# Patient Record
Sex: FEMALE | Race: WHITE | NOT HISPANIC OR LATINO | Employment: FULL TIME | URBAN - METROPOLITAN AREA
[De-identification: names, ages, dates, MRNs, and addresses within clinical notes are randomized per-mention and may not be internally consistent; named-entity substitution may affect disease eponyms.]

---

## 2017-03-03 ENCOUNTER — GENERIC CONVERSION - ENCOUNTER (OUTPATIENT)
Dept: OTHER | Facility: OTHER | Age: 32
End: 2017-03-03

## 2017-03-03 DIAGNOSIS — N92.6 IRREGULAR MENSTRUATION: ICD-10-CM

## 2017-03-03 DIAGNOSIS — Z33.1 PREGNANT STATE, INCIDENTAL: ICD-10-CM

## 2017-03-03 DIAGNOSIS — Z86.39 PERSONAL HISTORY OF OTHER ENDOCRINE, NUTRITIONAL AND METABOLIC DISEASE: ICD-10-CM

## 2017-03-04 ENCOUNTER — TRANSCRIBE ORDERS (OUTPATIENT)
Dept: LAB | Facility: CLINIC | Age: 32
End: 2017-03-04

## 2017-03-05 LAB — HCG QUANTITATIVE (HISTORICAL): <2 MIU/ML

## 2017-03-24 ENCOUNTER — ALLSCRIPTS OFFICE VISIT (OUTPATIENT)
Dept: OTHER | Facility: OTHER | Age: 32
End: 2017-03-24

## 2017-03-26 ENCOUNTER — LAB CONVERSION - ENCOUNTER (OUTPATIENT)
Dept: OTHER | Facility: OTHER | Age: 32
End: 2017-03-26

## 2017-03-26 LAB
HBA1C MFR BLD HPLC: 5 % OF TOTAL HGB
TSH SERPL DL<=0.05 MIU/L-ACNC: 1.04 MIU/L

## 2017-03-27 ENCOUNTER — LAB CONVERSION - ENCOUNTER (OUTPATIENT)
Dept: OTHER | Facility: OTHER | Age: 32
End: 2017-03-27

## 2017-03-27 LAB
HBA1C MFR BLD HPLC: 5 % OF TOTAL HGB
TSH SERPL DL<=0.05 MIU/L-ACNC: 1.04 MIU/L

## 2017-03-29 ENCOUNTER — LAB CONVERSION - ENCOUNTER (OUTPATIENT)
Dept: OTHER | Facility: OTHER | Age: 32
End: 2017-03-29

## 2017-03-29 LAB
A/G RATIO (HISTORICAL): 1.7 (CALC) (ref 1–2.5)
ALBUMIN SERPL BCP-MCNC: 4.3 G/DL (ref 3.6–5.1)
ALP SERPL-CCNC: 51 U/L (ref 33–115)
ALT SERPL W P-5'-P-CCNC: 17 U/L (ref 6–29)
AST SERPL W P-5'-P-CCNC: 21 U/L (ref 10–30)
BASOPHILS # BLD AUTO: 0.3 %
BASOPHILS # BLD AUTO: 16 CELLS/UL (ref 0–200)
BILIRUB SERPL-MCNC: 0.6 MG/DL (ref 0.2–1.2)
BUN SERPL-MCNC: 16 MG/DL (ref 7–25)
BUN/CREA RATIO (HISTORICAL): NORMAL (CALC) (ref 6–22)
CALCIUM SERPL-MCNC: 9.5 MG/DL (ref 8.6–10.2)
CHLORIDE SERPL-SCNC: 103 MMOL/L (ref 98–110)
CO2 SERPL-SCNC: 28 MMOL/L (ref 20–31)
CREAT SERPL-MCNC: 0.91 MG/DL (ref 0.5–1.1)
DEPRECATED RDW RBC AUTO: 12.9 % (ref 11–15)
DHEA-SO4 (HISTORICAL): 126 MCG/DL (ref 23–266)
EGFR AFRICAN AMERICAN (HISTORICAL): 97 ML/MIN/1.73M2
EGFR-AMERICAN CALC (HISTORICAL): 84 ML/MIN/1.73M2
EOSINOPHIL # BLD AUTO: 194 CELLS/UL (ref 15–500)
EOSINOPHIL # BLD AUTO: 3.6 %
GAMMA GLOBULIN (HISTORICAL): 2.6 G/DL (CALC) (ref 1.9–3.7)
GLUCOSE (HISTORICAL): 84 MG/DL (ref 65–99)
HBA1C MFR BLD HPLC: 5 % OF TOTAL HGB
HCT VFR BLD AUTO: 41.8 % (ref 35–45)
HGB BLD-MCNC: 13.8 G/DL (ref 11.7–15.5)
INSULIN (HISTORICAL): 2.1 UIU/ML (ref 2–19.6)
LYMPHOCYTES # BLD AUTO: 1566 CELLS/UL (ref 850–3900)
LYMPHOCYTES # BLD AUTO: 29 %
MCH RBC QN AUTO: 28.8 PG (ref 27–33)
MCHC RBC AUTO-ENTMCNC: 33.1 G/DL (ref 32–36)
MCV RBC AUTO: 87.1 FL (ref 80–100)
MONOCYTES # BLD AUTO: 319 CELLS/UL (ref 200–950)
MONOCYTES (HISTORICAL): 5.9 %
NEUTROPHILS # BLD AUTO: 3305 CELLS/UL (ref 1500–7800)
NEUTROPHILS # BLD AUTO: 61.2 %
PLATELET # BLD AUTO: 201 THOUSAND/UL (ref 140–400)
PMV BLD AUTO: 10.2 FL (ref 7.5–12.5)
POTASSIUM SERPL-SCNC: 4.2 MMOL/L (ref 3.5–5.3)
RBC # BLD AUTO: 4.8 MILLION/UL (ref 3.8–5.1)
SODIUM SERPL-SCNC: 137 MMOL/L (ref 135–146)
T4 FREE SERPL-MCNC: 1.1 NG/DL (ref 0.8–1.8)
TESTOSTERONE FREE (HISTORICAL): 5.2 PG/ML (ref 0.1–6.4)
TESTOSTERONE TOTAL (HISTORICAL): 53 NG/DL (ref 2–45)
TOTAL PROTEIN (HISTORICAL): 6.9 G/DL (ref 6.1–8.1)
TSH SERPL DL<=0.05 MIU/L-ACNC: 1.04 MIU/L
WBC # BLD AUTO: 5.4 THOUSAND/UL (ref 3.8–10.8)

## 2017-07-12 ENCOUNTER — GENERIC CONVERSION - ENCOUNTER (OUTPATIENT)
Dept: OTHER | Facility: OTHER | Age: 32
End: 2017-07-12

## 2017-07-12 DIAGNOSIS — Z33.1 PREGNANT STATE, INCIDENTAL: ICD-10-CM

## 2017-07-13 LAB — HCG QUANTITATIVE (HISTORICAL): <2 MIU/ML

## 2017-07-14 ENCOUNTER — HOSPITAL ENCOUNTER (EMERGENCY)
Facility: HOSPITAL | Age: 32
Discharge: HOME | End: 2017-07-14
Attending: EMERGENCY MEDICINE | Admitting: EMERGENCY MEDICINE
Payer: COMMERCIAL

## 2017-07-14 ENCOUNTER — APPOINTMENT (EMERGENCY)
Dept: CT IMAGING | Facility: HOSPITAL | Age: 32
End: 2017-07-14
Payer: COMMERCIAL

## 2017-07-14 VITALS
OXYGEN SATURATION: 100 % | DIASTOLIC BLOOD PRESSURE: 55 MMHG | RESPIRATION RATE: 16 BRPM | HEART RATE: 53 BPM | WEIGHT: 132.94 LBS | SYSTOLIC BLOOD PRESSURE: 108 MMHG | TEMPERATURE: 99.1 F

## 2017-07-14 DIAGNOSIS — K59.00 CONSTIPATION: ICD-10-CM

## 2017-07-14 DIAGNOSIS — K38.9 APPENDICOLITH: ICD-10-CM

## 2017-07-14 DIAGNOSIS — N83.209 RUPTURED OVARIAN CYST: ICD-10-CM

## 2017-07-14 DIAGNOSIS — R10.30 LOWER ABDOMINAL PAIN: Primary | ICD-10-CM

## 2017-07-14 LAB
ALBUMIN SERPL BCP-MCNC: 3.5 G/DL (ref 3.5–5)
ALP SERPL-CCNC: 58 U/L (ref 46–116)
ALT SERPL W P-5'-P-CCNC: 23 U/L (ref 12–78)
ANION GAP SERPL CALCULATED.3IONS-SCNC: 6 MMOL/L (ref 4–13)
AST SERPL W P-5'-P-CCNC: 19 U/L (ref 5–45)
B-HCG SERPL-ACNC: <2 MIU/ML
BASOPHILS # BLD AUTO: 0.02 THOUSANDS/ΜL (ref 0–0.1)
BASOPHILS NFR BLD AUTO: 0 % (ref 0–1)
BILIRUB SERPL-MCNC: 0.4 MG/DL (ref 0.2–1)
BILIRUB UR QL STRIP: NEGATIVE
BUN SERPL-MCNC: 9 MG/DL (ref 5–25)
CALCIUM SERPL-MCNC: 8.9 MG/DL (ref 8.3–10.1)
CHLORIDE SERPL-SCNC: 104 MMOL/L (ref 100–108)
CLARITY UR: CLEAR
CO2 SERPL-SCNC: 27 MMOL/L (ref 21–32)
COLOR UR: YELLOW
CREAT SERPL-MCNC: 0.93 MG/DL (ref 0.6–1.3)
EOSINOPHIL # BLD AUTO: 0.07 THOUSAND/ΜL (ref 0–0.61)
EOSINOPHIL NFR BLD AUTO: 1 % (ref 0–6)
ERYTHROCYTE [DISTWIDTH] IN BLOOD BY AUTOMATED COUNT: 11.9 % (ref 11.6–15.1)
GFR SERPL CREATININE-BSD FRML MDRD: >60 ML/MIN/1.73SQ M
GLUCOSE SERPL-MCNC: 103 MG/DL (ref 65–140)
GLUCOSE UR STRIP-MCNC: NEGATIVE MG/DL
HCT VFR BLD AUTO: 41.2 % (ref 34.8–46.1)
HGB BLD-MCNC: 13.9 G/DL (ref 11.5–15.4)
HGB UR QL STRIP.AUTO: NEGATIVE
HOLD SPECIMEN: NORMAL
KETONES UR STRIP-MCNC: NEGATIVE MG/DL
LEUKOCYTE ESTERASE UR QL STRIP: NEGATIVE
LIPASE SERPL-CCNC: 115 U/L (ref 73–393)
LYMPHOCYTES # BLD AUTO: 0.75 THOUSANDS/ΜL (ref 0.6–4.47)
LYMPHOCYTES NFR BLD AUTO: 11 % (ref 14–44)
MCH RBC QN AUTO: 28.9 PG (ref 26.8–34.3)
MCHC RBC AUTO-ENTMCNC: 33.7 G/DL (ref 31.4–37.4)
MCV RBC AUTO: 86 FL (ref 82–98)
MONOCYTES # BLD AUTO: 0.47 THOUSAND/ΜL (ref 0.17–1.22)
MONOCYTES NFR BLD AUTO: 7 % (ref 4–12)
NEUTROPHILS # BLD AUTO: 5.28 THOUSANDS/ΜL (ref 1.85–7.62)
NEUTS SEG NFR BLD AUTO: 81 % (ref 43–75)
NITRITE UR QL STRIP: NEGATIVE
PH UR STRIP.AUTO: 5 [PH] (ref 4.5–8)
PLATELET # BLD AUTO: 179 THOUSANDS/UL (ref 149–390)
PMV BLD AUTO: 11.6 FL (ref 8.9–12.7)
POTASSIUM SERPL-SCNC: 3.7 MMOL/L (ref 3.5–5.3)
PROT SERPL-MCNC: 7.1 G/DL (ref 6.4–8.2)
PROT UR STRIP-MCNC: NEGATIVE MG/DL
RBC # BLD AUTO: 4.81 MILLION/UL (ref 3.81–5.12)
SODIUM SERPL-SCNC: 137 MMOL/L (ref 136–145)
SP GR UR STRIP.AUTO: <=1.005 (ref 1–1.03)
UROBILINOGEN UR QL STRIP.AUTO: 0.2 E.U./DL
WBC # BLD AUTO: 6.59 THOUSAND/UL (ref 4.31–10.16)

## 2017-07-14 PROCEDURE — 81003 URINALYSIS AUTO W/O SCOPE: CPT | Performed by: EMERGENCY MEDICINE

## 2017-07-14 PROCEDURE — 85025 COMPLETE CBC W/AUTO DIFF WBC: CPT | Performed by: EMERGENCY MEDICINE

## 2017-07-14 PROCEDURE — 84702 CHORIONIC GONADOTROPIN TEST: CPT | Performed by: EMERGENCY MEDICINE

## 2017-07-14 PROCEDURE — 80053 COMPREHEN METABOLIC PANEL: CPT | Performed by: EMERGENCY MEDICINE

## 2017-07-14 PROCEDURE — 74177 CT ABD & PELVIS W/CONTRAST: CPT

## 2017-07-14 PROCEDURE — 36415 COLL VENOUS BLD VENIPUNCTURE: CPT | Performed by: EMERGENCY MEDICINE

## 2017-07-14 PROCEDURE — 83690 ASSAY OF LIPASE: CPT | Performed by: EMERGENCY MEDICINE

## 2017-07-14 PROCEDURE — 99284 EMERGENCY DEPT VISIT MOD MDM: CPT

## 2017-07-14 PROCEDURE — 96360 HYDRATION IV INFUSION INIT: CPT

## 2017-07-14 RX ADMIN — SODIUM CHLORIDE 1000 ML: 0.9 INJECTION, SOLUTION INTRAVENOUS at 05:06

## 2017-07-14 RX ADMIN — IOHEXOL 100 ML: 350 INJECTION, SOLUTION INTRAVENOUS at 06:38

## 2017-07-17 ENCOUNTER — GENERIC CONVERSION - ENCOUNTER (OUTPATIENT)
Dept: OTHER | Facility: OTHER | Age: 32
End: 2017-07-17

## 2017-07-24 LAB
ESTRADIOL LEVEL (HISTORICAL): 45 PG/ML
ESTRADIOL, FREE (HISTORICAL): 0.73 PG/ML
HCG QUANTITATIVE (HISTORICAL): <2 MIU/ML
PROGESTERONE LEVEL (HISTORICAL): <0.1 NG/ML

## 2017-07-25 ENCOUNTER — GENERIC CONVERSION - ENCOUNTER (OUTPATIENT)
Dept: OTHER | Facility: OTHER | Age: 32
End: 2017-07-25

## 2017-08-13 LAB
HCG QUANTITATIVE (HISTORICAL): 173 MIU/ML
PROGESTERONE LEVEL (HISTORICAL): 16.1 NG/ML

## 2017-08-14 DIAGNOSIS — Z34.90 ENCOUNTER FOR SUPERVISION OF NORMAL PREGNANCY: ICD-10-CM

## 2017-08-14 DIAGNOSIS — O26.859 SPOTTING COMPLICATING PREGNANCY: ICD-10-CM

## 2017-08-15 ENCOUNTER — GENERIC CONVERSION - ENCOUNTER (OUTPATIENT)
Dept: OTHER | Facility: OTHER | Age: 32
End: 2017-08-15

## 2017-08-15 LAB — HCG QUANTITATIVE (HISTORICAL): 925 MIU/ML

## 2017-08-22 LAB — HCG QUANTITATIVE (HISTORICAL): NORMAL MIU/ML

## 2017-08-25 ENCOUNTER — HOSPITAL ENCOUNTER (OUTPATIENT)
Dept: ULTRASOUND IMAGING | Facility: HOSPITAL | Age: 32
Discharge: HOME | End: 2017-08-25
Payer: COMMERCIAL

## 2017-08-25 DIAGNOSIS — O26.859 SPOTTING COMPLICATING PREGNANCY: ICD-10-CM

## 2017-08-25 PROCEDURE — 76801 OB US < 14 WKS SINGLE FETUS: CPT

## 2017-09-13 ENCOUNTER — ALLSCRIPTS OFFICE VISIT (OUTPATIENT)
Dept: OTHER | Facility: OTHER | Age: 32
End: 2017-09-13

## 2017-09-13 ENCOUNTER — GENERIC CONVERSION - ENCOUNTER (OUTPATIENT)
Dept: OTHER | Facility: OTHER | Age: 32
End: 2017-09-13

## 2017-09-19 LAB
CFTR MUT ANL BLD/T: NORMAL
EXTERNAL ABO GROUPING: NORMAL
EXTERNAL ANTIBODY SCREEN: NORMAL
EXTERNAL HEMATOCRIT: 39.9 %
EXTERNAL HEMOGLOBIN: 13.5 G/DL
EXTERNAL HEPATITIS B SURFACE ANTIGEN: NORMAL
EXTERNAL HIV-1 ANTIBODY: NORMAL
EXTERNAL PLATELET COUNT: 237 K/ΜL
EXTERNAL RH FACTOR: POSITIVE
EXTERNAL RUBELLA IGG QUANTITATION: NORMAL
EXTERNAL SYPHILIS RPR SCREEN: NORMAL

## 2017-09-20 ENCOUNTER — LAB CONVERSION - ENCOUNTER (OUTPATIENT)
Dept: OTHER | Facility: OTHER | Age: 32
End: 2017-09-20

## 2017-09-20 LAB
AB SCRN, RBC W/RFLX ID,TITER,AG (HISTORICAL): NORMAL
ABO GROUP BLD: NORMAL
BACTERIA UR QL AUTO: ABNORMAL /HPF
BASOPHILS # BLD AUTO: 0.3 %
BASOPHILS # BLD AUTO: 27 CELLS/UL (ref 0–200)
BILIRUB UR QL STRIP: NEGATIVE
COLOR UR: YELLOW
COMMENT (HISTORICAL): CLEAR
DEPRECATED RDW RBC AUTO: 12.4 % (ref 11–15)
EOSINOPHIL # BLD AUTO: 1.4 %
EOSINOPHIL # BLD AUTO: 126 CELLS/UL (ref 15–500)
FECAL OCCULT BLOOD DIAGNOSTIC (HISTORICAL): NEGATIVE
GLUCOSE (HISTORICAL): NEGATIVE
HCT VFR BLD AUTO: 39.9 % (ref 35–45)
HEPATITIS B SURFACE ANTIGEN (HISTORICAL): NORMAL
HEPATITIS C ANTIBODY (HISTORICAL): NORMAL
HGB BLD-MCNC: 13.5 G/DL (ref 11.7–15.5)
HIV AG/AB, 4TH GEN (HISTORICAL): NORMAL
HYALINE CASTS #/AREA URNS LPF: ABNORMAL /LPF
KETONES UR STRIP-MCNC: NEGATIVE MG/DL
LEUKOCYTE ESTERASE UR QL STRIP: ABNORMAL
LYMPHOCYTES # BLD AUTO: 2178 CELLS/UL (ref 850–3900)
LYMPHOCYTES # BLD AUTO: 24.2 %
MCH RBC QN AUTO: 29.2 PG (ref 27–33)
MCHC RBC AUTO-ENTMCNC: 33.8 G/DL (ref 32–36)
MCV RBC AUTO: 86.4 FL (ref 80–100)
MONOCYTES # BLD AUTO: 783 CELLS/UL (ref 200–950)
MONOCYTES (HISTORICAL): 8.7 %
NEUTROPHILS # BLD AUTO: 5886 CELLS/UL (ref 1500–7800)
NEUTROPHILS # BLD AUTO: 65.4 %
NITRITE UR QL STRIP: NEGATIVE
PH UR STRIP.AUTO: 5.5 [PH] (ref 5–8)
PLATELET # BLD AUTO: 237 THOUSAND/UL (ref 140–400)
PMV BLD AUTO: 12.5 FL (ref 7.5–12.5)
PROT UR STRIP-MCNC: NEGATIVE MG/DL
RBC # BLD AUTO: 4.62 MILLION/UL (ref 3.8–5.1)
RBC (HISTORICAL): ABNORMAL /HPF
RH BLD: NORMAL
RPR SCREEN (HISTORICAL): NORMAL
RUBELLA, IGG (HISTORICAL): 3.44 INDEX
SIGNAL TO CUT-OFF (HISTORICAL): 0.02
SP GR UR STRIP.AUTO: 1.02 (ref 1–1.03)
SQUAMOUS EPITHELIAL CELLS (HISTORICAL): ABNORMAL /HPF
T4 FREE SERPL-MCNC: 1.9 NG/DL (ref 0.8–1.8)
TSH SERPL DL<=0.05 MIU/L-ACNC: 0.01 MIU/L
WBC # BLD AUTO: 9 THOUSAND/UL (ref 3.8–10.8)
WBC # BLD AUTO: ABNORMAL /HPF

## 2017-09-26 ENCOUNTER — LAB CONVERSION - ENCOUNTER (OUTPATIENT)
Dept: OTHER | Facility: OTHER | Age: 32
End: 2017-09-26

## 2017-09-26 DIAGNOSIS — R94.6 ABNORMAL RESULTS OF THYROID FUNCTION STUDIES: ICD-10-CM

## 2017-09-26 LAB
AB SCRN, RBC W/RFLX ID,TITER,AG (HISTORICAL): NORMAL
ABO GROUP BLD: NORMAL
BACTERIA UR QL AUTO: ABNORMAL /HPF
BASOPHILS # BLD AUTO: 0.3 %
BASOPHILS # BLD AUTO: 27 CELLS/UL (ref 0–200)
BILIRUB UR QL STRIP: NEGATIVE
CF COMMENT (HISTORICAL): NORMAL
COLOR UR: YELLOW
COMMENT (HISTORICAL): CLEAR
DEPRECATED RDW RBC AUTO: 12.4 % (ref 11–15)
EOSINOPHIL # BLD AUTO: 1.4 %
EOSINOPHIL # BLD AUTO: 126 CELLS/UL (ref 15–500)
ETHNICITY (HISTORICAL): NORMAL
FECAL OCCULT BLOOD DIAGNOSTIC (HISTORICAL): NEGATIVE
GLUCOSE (HISTORICAL): NEGATIVE
HCT VFR BLD AUTO: 39.9 % (ref 35–45)
HEPATITIS B SURFACE ANTIGEN (HISTORICAL): NORMAL
HEPATITIS C ANTIBODY (HISTORICAL): NORMAL
HGB BLD-MCNC: 13.5 G/DL (ref 11.7–15.5)
HIV AG/AB, 4TH GEN (HISTORICAL): NORMAL
HYALINE CASTS #/AREA URNS LPF: ABNORMAL /LPF
INTERPRETATION (HISTORICAL): NORMAL
KETONES UR STRIP-MCNC: NEGATIVE MG/DL
LEUKOCYTE ESTERASE UR QL STRIP: ABNORMAL
LYMPHOCYTES # BLD AUTO: 2178 CELLS/UL (ref 850–3900)
LYMPHOCYTES # BLD AUTO: 24.2 %
MCH RBC QN AUTO: 29.2 PG (ref 27–33)
MCHC RBC AUTO-ENTMCNC: 33.8 G/DL (ref 32–36)
MCV RBC AUTO: 86.4 FL (ref 80–100)
METHOD (HISTORICAL): NORMAL
MONOCYTES # BLD AUTO: 783 CELLS/UL (ref 200–950)
MONOCYTES (HISTORICAL): 8.7 %
MUTATIONS/POLYMORPHISMS (HISTORICAL): NORMAL
NEUTROPHILS # BLD AUTO: 5886 CELLS/UL (ref 1500–7800)
NEUTROPHILS # BLD AUTO: 65.4 %
NITRITE UR QL STRIP: NEGATIVE
PH UR STRIP.AUTO: 5.5 [PH] (ref 5–8)
PLATELET # BLD AUTO: 237 THOUSAND/UL (ref 140–400)
PMV BLD AUTO: 12.5 FL (ref 7.5–12.5)
PROT UR STRIP-MCNC: NEGATIVE MG/DL
RBC # BLD AUTO: 4.62 MILLION/UL (ref 3.8–5.1)
RBC (HISTORICAL): ABNORMAL /HPF
REVIEWED BY (HISTORICAL): NORMAL
RH BLD: NORMAL
RPR SCREEN (HISTORICAL): NORMAL
RUBELLA, IGG (HISTORICAL): 3.44 INDEX
SIGNAL TO CUT-OFF (HISTORICAL): 0.02
SP GR UR STRIP.AUTO: 1.02 (ref 1–1.03)
SQUAMOUS EPITHELIAL CELLS (HISTORICAL): ABNORMAL /HPF
T4 FREE SERPL-MCNC: 1.9 NG/DL (ref 0.8–1.8)
TSH SERPL DL<=0.05 MIU/L-ACNC: 0.01 MIU/L
WBC # BLD AUTO: 9 THOUSAND/UL (ref 3.8–10.8)
WBC # BLD AUTO: ABNORMAL /HPF

## 2017-10-01 ENCOUNTER — HOSPITAL ENCOUNTER (OUTPATIENT)
Dept: ULTRASOUND IMAGING | Facility: HOSPITAL | Age: 32
Discharge: HOME | End: 2017-10-01
Payer: COMMERCIAL

## 2017-10-01 DIAGNOSIS — R94.6 ABNORMAL RESULTS OF THYROID FUNCTION STUDIES: ICD-10-CM

## 2017-10-01 PROCEDURE — 76536 US EXAM OF HEAD AND NECK: CPT

## 2017-10-14 ENCOUNTER — GENERIC CONVERSION - ENCOUNTER (OUTPATIENT)
Dept: OTHER | Facility: OTHER | Age: 32
End: 2017-10-14

## 2017-11-09 ENCOUNTER — ALLSCRIPTS OFFICE VISIT (OUTPATIENT)
Dept: OTHER | Facility: OTHER | Age: 32
End: 2017-11-09

## 2017-11-10 NOTE — CONSULTS
Assessment  1  Multiple thyroid nodules (241 1) (E04 2)   2  Low TSH level (794 5) (R94 6)   3  Pregnancy (V22 2) (Z34 90)    Plan  Multiple thyroid nodules    · (1) T4, FREE; Status:Active; Requested for:24Nov2017;    Perform:[facility]; XKB:[id number]; Ordered; For:Multiple thyroid nodules; Ordered By:Sonny Forman;   · (1) TSH; Status:Active; Requested for:24Nov2017;    Perform:[facility]; UHR:[id number]; Ordered; For:Multiple thyroid nodules; Ordered By:Sonny Forman;   · US THYROID; Status:Active; Requested DBX:[id number]; Perform:[facility]; LNP:[id number]; Last Updated Cholo Cast; 11/9/2017 10:53:31 AM;Ordered;thyroid nodules; Ordered By:Sonny Forman;   · Follow-up visit in 6 months Evaluation and Treatment  Follow-up  Status: Complete Done: 34QNF0241   Ordered;Multiple thyroid nodules; Ordered By: Ralph Taylor Performed:  Due: 74GIB6944; Last Updated By: Karin Zamora; 11/9/2017 10:53:18 AM    Discussion/Summary   thyroid nodules -thyroid ultrasound reviewed with the patient-thyroid nodules currently do not meet criteria for fine-needle aspiration biopsy  Will repeat a thyroid ultrasound after she delivers  to monitor for any changes in the size or characteristics of the nodules  low TSH level- likely related to pregnancy, TSH has improved, will monitor and repeat thyroid function test in the next month  If normal then once a trimester and recheck after she delivers  3  Pregnancy -progressing well, continue follow-up with Ob Counseling Documentation With Imm: The patient was counseled regarding diagnostic results,-- instructions for management,-- risk factor reductions,-- impressions,-- risks and benefits of treatment options,-- importance of compliance with treatment  Medication SE Review and Pt Understands Tx: The treatment plan was reviewed with the patient/guardian   The patient/guardian understands and agrees with the treatment plan   Patient's Capacity to Self-Care: Patient is able to Self-Care  Chief Complaint  Chief Complaint Free Text Note Form: Consult  History of Present Illness  HPI: 76-year-old woman referred here for evaluation of thyroid nodules and abnormal thyroid function test   She is [id number] weeks pregnant   ,2010 - had an episode of what sounds like thyroiditis - was on methimazole for a few weeks- has had repeat thyroid function tests over the years which have been normal had irregular menstrual for years  pregnancy - conceived naturallyprogressing well, has gained some weight -10 lbsdiarrhea , no palpitationsdisturbancesneck pain  FH of thyroid cancer, no RT to neck      Review of Systems  Endo Adult ROS Female New Patient:  Constitutional/General: no change in ring size,-- no change in shoe size,-- no chills,-- no dizziness,-- no fainting,-- no fatigue,-- no fever,-- no forgetfulness,-- headache,-- loss of sleep,-- no recent weight loss,-- no nervousness,-- no numbness,-- no temperature intolerance,-- no excessive sweating-- and-- weight gain  Muscle/Joint/Bone: no arm pain,-- no back pain,-- no hip pain,-- no leg pain,-- no foot pain,-- no neck pain,-- no hand pain,-- no shoulder pain,-- no arm weakness,-- no back weakness,-- no hip weakness,-- no leg weakness,-- no foot weakness,-- no neck weakness,-- no hand weakness,-- no shoulder weakness,-- no arm numbness,-- no back numbness,-- no hip numbness,-- no leg numbness,-- no foot numbness,-- no neck numbness,-- no hand numbness-- and-- no shoulder numbness  Gastrointestinal: diarrhea-- and-- nausea, but-- no constipation,-- no excessive hunger,-- no excessive thirst,-- no poor appetite,-- no rectal bleeding,-- no stomach pain,-- no vomiting-- and-- no vomiting blood  Cardiovascular: chest pain, but-- no hypertension,-- no irregular heart beat,-- no hypotension,-- no poor circulation,-- no rapid heart beat-- and-- no ankle swelling    Eye/Ear/Nose/Throat: no bleeding gums,-- no blurred vision,-- no difficulty swallowing,-- no double vision,-- no gritty eyes,-- no hoarseness,-- no hearing loss,-- no persistent cough,-- no sinus problems,-- not seeing flashes-- and-- not seeing halos  Skin: no easy bruising,-- no hives,-- no itching,-- no change in a mole,-- no rashes,-- no scar-- and-- no slow healing sores  Genitourinary no blood in urine,-- no frequent urination,-- no night time urination-- and-- no painful urination  Genitourinary - Reproductive abnorrmal period, but-- no bleeding between periods,-- no breast lump,-- no hot flashes,-- no nipple discharge,-- no vaginal discharge,-- not pregnant-- and-- no children  date of last menstruation 7/23/17 the interval of the LMP is unknown periods usually lasts 4 days   ROS Reviewed:   ROS reviewed  Active Problems  1  Abnormal thyroid screen (blood) (794 5) (R94 6)   2  Encounter for prenatal care of first pregnancy, second trimester (V22 0) (Z34 02)   3  History of hyperthyroidism (V12 29) (Z86 39)   4  Pregnancy (V22 2) (Z34 90)    Past Medical History    1  History of Contraceptive use (V25 40) (Z30 40)   2  History of amenorrhea (V13 29) (Z87 42)   3  History of irregular menstrual cycles (V13 29) (Z87 42)   4  History of thyroid disease (V12 29) (Z86 39)   5  History of Menarche (V21 8)   6  History of Spotting in pregnancy, antepartum condition or complication (111 89) (K57 003)   7  History of Visit for routine gyn exam (V72 31) (Z01 419)  Active Problems And Past Medical History Reviewed: The active problems and past medical history were reviewed and updated today  Surgical History  Surgical History Reviewed: The surgical history was reviewed and updated today  Family History  Mother    1  Family history of malignant neoplasm of female breast (V16 3) (Z80 3)   2  Family history of thyroid disease (V18 19) (Z83 49)  Grandmother    3  Family history of thyroid disease (V18 19) (Z83 49)  Grandfather    4   Family history of diabetes mellitus (V18 0) (Z83 3)  Uncle    5  Family history of diabetes mellitus (V18 0) (Z83 3)  Family History Reviewed: The family history was reviewed and updated today  Social History     · Engaged to be    · Monogamous relationship   · Never a smoker   · No drug use   · Occupation   · Primary spoken language English   · Racial background   · Social alcohol use (Z78 9)  Social History Reviewed: The social history was reviewed and updated today  Current Meds   1  Prenatal One Daily TABS; Therapy: (Recorded:24Mar2017) to Recorded  Medication List Reviewed: The medication list was reviewed and updated today  Allergies  1  No Known Drug Allergies    Vitals  Vital Signs    Recorded: 63MRG4647 10:24AM   Heart Rate 64   Systolic 183   Diastolic 74   Height 5 ft 4 in   Weight 135 lb 2 08 oz   BMI Calculated 23 19   BSA Calculated 1 66       Physical Exam   Constitutional  General appearance: No acute distress, well appearing and well nourished  Eyes  Conjunctiva and lids: No swelling, erythema, or discharge  Pupils: Equal, round and reactive to light  The sclera are anicteric  Extraocular movements are intact  Ears, Nose, Mouth, and Throat  External inspection of ears, nose and lips: Normal    Exam of Head: The head is atraumatic and normocephalic  Neck: The neck is supple  The thyroid is normal in size with no palpable nodules  Pulmonary  Auscultation of lungs: Clear to auscultation bilaterally with normal chest expansion  Cardiovascular  Auscultation of heart: Normal rate and rhythm with no murmurs, gallops or rubs  Examination of extremities for edema and/or varicosities: Normal    Abdomen  Abdomen: Abdomen is soft, non-tender with normal bowel sounds  Lymphatic  Palpation of lymph nodes: No supraclavicular or suboccipital lymphadenopathy     Musculoskeletal  Gait and station: Normal    Inspection/palpation of joints, bones, and muscles: Muscle bulk and tone is normal    Skin  Skin and subcutaneous tissue: Normal skin temperature and color  Neurologic  Motor Strength: Strength is 5/5 bilaterally  Psychiatric  Orientation to person, place and time: Normal    Mood and affect: Affect and attention span are normal        Results/Data  (1) TSH 24Oct2017 11:25AM Drema Civatte     Test Name Result Flag Reference   TSH 0 28         Summary / No summary entered :    No summary entered  Documents attached :    Constantine Patel Deophyllis Choco; Enc: [id number] - CDA - - Exie Appling) (Additional Information    Document)  (1) T4, FREE 24Oct2017 11:24AM Drema Civatte     Test Name Result Flag Reference   T4,FREE 1 1         Summary / No summary entered :    No summary entered  Documents attached :    Constantine Guthrie; Enc: [id number] - CDA - - [facility]ie Appling) (Additional Information    Document)  [address] THYROID [id number] 02:18PM Rachna Whitttcher Order Number: [id number]   - Patient Instructions: To schedule this appointment, please contact Central Scheduling at [phone]  Test Name Result Flag Reference   US THYROID (Report)       THYROID ULTRASOUND   INDICATION: Hyperthyroidism   COMPARISON: None  TECHNIQUE:  Ultrasound of the thyroid was performed with a high frequency linear transducer in transverse and sagittal planes including volumetric imaging sweeps as well as traditional still imaging technique  FINDINGS:  Thyroid parenchyma is diffusely heterogeneous in echotexture  Right gland: 5 5 x 1 4 x 1 8 cm  There is a 5 x 3 x 5 mm colloid cyst at the midpole right lobe of the thyroid  5 x 5 x 3 mm hypoechoic well-circumscribed nodule at the upper pole right lobe of the thyroid with smooth margins  This is wider than it is tall  This was measured on cine images and measures 8 x 9 x 5 mm hypoechoic nodule which is wider than it is   tall with smooth margins at the lower pole right lobe of the thyroid  This does not meet criteria for fine needle aspiration at this time  Left gland: 5 5 x 1 4 x 1 2 cm  No dominant nodules  Isthmus: The isthmus is 0 3 cm in AP dimension  IMPRESSION:    Heterogeneous appearance of the thyroid gland with subcentimeter right-sided thyroid nodules  These do not meet criteria for fine needle aspiration at this time  Consider yearly follow-up exam to confirm stability of these findings  Workstation performed: [id number]   Signed by:  Zara Hall MD  10/4/17     (1) T4, FREE [id number] 03:30PM [facility] Susie     Test Name Result Flag Reference   T4, FREE 1 9 ng/dL H 0 8-1 8     (Q) TSH, 3RD GENERATION [id number] 03:30PM Pilar Torrington     Test Name Result Flag Reference   TSH 0 01 mIU/L L      Reference Range                       > or = 20 Years  0 40-4 50                            Pregnancy Ranges           First trimester    0 26-2 66           Second trimester   0 55-2 73           Third trimester    0 43-2 91     Future Appointments    Date/Time Provider Specialty Site   2018 10:20 AM NELIDA Fair   Endocrinology [facility]   2017 04:00 PM Nevin Krause MD Obstetrics/Gynecology [facility]   2017 02:30 PM  [address]       Signatures   Electronically signed by : NELIDA Gonzalez ; 2017 12:39PM EST                       (Author)

## 2017-11-17 ENCOUNTER — GENERIC CONVERSION - ENCOUNTER (OUTPATIENT)
Dept: OTHER | Facility: OTHER | Age: 32
End: 2017-11-17

## 2017-11-26 ENCOUNTER — LAB CONVERSION - ENCOUNTER (OUTPATIENT)
Dept: OTHER | Facility: OTHER | Age: 32
End: 2017-11-26

## 2017-11-26 LAB
T4 FREE SERPL-MCNC: 1.2 NG/DL (ref 0.8–1.8)
TSH SERPL DL<=0.05 MIU/L-ACNC: 0.41 MIU/L

## 2017-11-27 ENCOUNTER — GENERIC CONVERSION - ENCOUNTER (OUTPATIENT)
Dept: OTHER | Facility: OTHER | Age: 32
End: 2017-11-27

## 2017-12-01 ENCOUNTER — GENERIC CONVERSION - ENCOUNTER (OUTPATIENT)
Dept: OTHER | Facility: OTHER | Age: 32
End: 2017-12-01

## 2017-12-01 ENCOUNTER — ALLSCRIPTS OFFICE VISIT (OUTPATIENT)
Dept: PERINATAL CARE | Facility: CLINIC | Age: 32
End: 2017-12-01
Payer: COMMERCIAL

## 2017-12-01 PROCEDURE — 76817 TRANSVAGINAL US OBSTETRIC: CPT | Performed by: OBSTETRICS & GYNECOLOGY

## 2017-12-01 PROCEDURE — 76805 OB US >/= 14 WKS SNGL FETUS: CPT | Performed by: OBSTETRICS & GYNECOLOGY

## 2017-12-15 ENCOUNTER — GENERIC CONVERSION - ENCOUNTER (OUTPATIENT)
Dept: OTHER | Facility: OTHER | Age: 32
End: 2017-12-15

## 2018-01-12 ENCOUNTER — GENERIC CONVERSION - ENCOUNTER (OUTPATIENT)
Dept: OTHER | Facility: OTHER | Age: 33
End: 2018-01-12

## 2018-01-12 VITALS
DIASTOLIC BLOOD PRESSURE: 74 MMHG | WEIGHT: 135.13 LBS | HEIGHT: 64 IN | SYSTOLIC BLOOD PRESSURE: 130 MMHG | HEART RATE: 64 BPM | BODY MASS INDEX: 23.07 KG/M2

## 2018-01-13 NOTE — CONSULTS
I had the pleasure of evaluating your patient, Carina Setting  My full evaluation follows:      Chief Complaint  Consult  History of Present Illness  56-year-old woman referred here for evaluation of thyroid nodules and abnormal thyroid function test     She is [id number] weeks pregnant   ,  in 2010 - had an episode of what sounds like thyroiditis - was on methimazole for a few weeks- has had repeat thyroid function tests over the years which have been normal   has had irregular menstrual for years  ist pregnancy - conceived naturally   pregnancy progressing well, has gained some weight -10 lbs   occasional diarrhea , no palpitations   sleep disturbances   no neck pain  no FH of thyroid cancer, no RT to neck      Review of Systems    Constitutional/General: no change in ring size, no change in shoe size, no chills, no dizziness, no fainting, no fatigue, no fever, no forgetfulness, headache, loss of sleep, no recent weight loss, no nervousness, no numbness, no temperature intolerance, no excessive sweating and weight gain  Muscle/Joint/Bone: no arm pain, no back pain, no hip pain, no leg pain, no foot pain, no neck pain, no hand pain, no shoulder pain, no arm weakness, no back weakness, no hip weakness, no leg weakness, no foot weakness, no neck weakness, no hand weakness, no shoulder weakness, no arm numbness, no back numbness, no hip numbness, no leg numbness, no foot numbness, no neck numbness, no hand numbness and no shoulder numbness  Gastrointestinal: diarrhea and nausea, but no constipation, no excessive hunger, no excessive thirst, no poor appetite, no rectal bleeding, no stomach pain, no vomiting and no vomiting blood  Cardiovascular: chest pain, but no hypertension, no irregular heart beat, no hypotension, no poor circulation, no rapid heart beat and no ankle swelling     Eye/Ear/Nose/Throat: no bleeding gums, no blurred vision, no difficulty swallowing, no double vision, no gritty eyes, no hoarseness, no hearing loss, no persistent cough, no sinus problems, not seeing flashes and not seeing halos  Skin: no easy bruising, no hives, no itching, no change in a mole, no rashes, no scar and no slow healing sores  Genitourinary no blood in urine, no frequent urination, no night time urination and no painful urination  Genitourinary - Reproductive abnorrmal period, but no bleeding between periods, no breast lump, no hot flashes, no nipple discharge, no vaginal discharge, not pregnant and no children  date of last menstruation 7/23/17 the interval of the LMP is unknown periods usually lasts 4 days     ROS reviewed  Active Problems    1  Abnormal thyroid screen (blood) (794 5) (R94 6)   2  Encounter for prenatal care of first pregnancy, second trimester (V22 0) (Z34 02)   3  History of hyperthyroidism (V12 29) (Z86 39)   4  Pregnancy (V22 2) (Z34 90)    Past Medical History    · History of Contraceptive use (V25 40) (Z30 40)   · History of amenorrhea (V13 29) (Z87 42)   · History of irregular menstrual cycles (V13 29) (Z87 42)   · History of thyroid disease (V12 29) (Z86 39)   · History of Menarche (V21 8)   · History of Spotting in pregnancy, antepartum condition or complication (510 53)  (T44 905)   · History of Visit for routine gyn exam (V72 31) (Z01 419)    The active problems and past medical history were reviewed and updated today  Surgical History    The surgical history was reviewed and updated today  Family History    · Family history of malignant neoplasm of female breast (V16 3) (Z80 3)   · Family history of thyroid disease (V18 19) (Z83 49)    · Family history of thyroid disease (V18 19) (Z83 49)    · Family history of diabetes mellitus (V18 0) (Z83 3)    · Family history of diabetes mellitus (V18 0) (Z83 3)    The family history was reviewed and updated today         Social History    · Engaged to be    · Monogamous relationship   · Never a smoker   · No drug use   · Occupation   · TEACHER   · Primary spoken language English   · Racial background   · WHITE   · Social alcohol use (Z78 9)  The social history was reviewed and updated today  Current Meds   1  Prenatal One Daily TABS; Therapy: (Recorded:24Mar2017) to Recorded    The medication list was reviewed and updated today  Allergies    1  No Known Drug Allergies    Vitals   Recorded: 25LQE8874 10:24AM   Heart Rate 64   Systolic 294   Diastolic 74   Height 5 ft 4 in   Weight 135 lb 2 08 oz   BMI Calculated 23 19   BSA Calculated 1 66     Physical Exam    Constitutional   General appearance: No acute distress, well appearing and well nourished  Eyes   Conjunctiva and lids: No swelling, erythema, or discharge  Pupils: Equal, round and reactive to light  The sclera are anicteric  Extraocular movements are intact  Ears, Nose, Mouth, and Throat   External inspection of ears, nose and lips: Normal     Exam of Head: The head is atraumatic and normocephalic  Neck: The neck is supple  The thyroid is normal in size with no palpable nodules  Pulmonary   Auscultation of lungs: Clear to auscultation bilaterally with normal chest expansion  Cardiovascular   Auscultation of heart: Normal rate and rhythm with no murmurs, gallops or rubs  Examination of extremities for edema and/or varicosities: Normal     Abdomen   Abdomen: Abdomen is soft, non-tender with normal bowel sounds  Lymphatic   Palpation of lymph nodes: No supraclavicular or suboccipital lymphadenopathy  Musculoskeletal   Gait and station: Normal     Inspection/palpation of joints, bones, and muscles: Muscle bulk and tone is normal     Skin   Skin and subcutaneous tissue: Normal skin temperature and color  Neurologic   Motor Strength: Strength is 5/5 bilaterally      Psychiatric   Orientation to person, place and time: Normal     Mood and affect: Affect and attention span are normal        Results/Data  (1) TSH 24Oct2017 11:25AM Carl Jen Knight     Test Name Result Flag Reference   TSH 0 28       Summary / No summary entered :      No summary entered  Documents attached :      Constantine Nava; Enc: [id number] - CDA - - [facility]) (Additional Information      Document)  (1) T4, FREE 24Oct2017 11:24AM [place] Esquivel     Test Name Result Flag Reference   T4,FREE 1 1       Summary / No summary entered :      No summary entered  Documents attached :      Francisco Javiermichelle AvitiaMelinda Tena; Enc: [id number] - CDA - - [facility]) (Additional Information      Document)  US THYROID [id number] 02:18PM Gray Villareal Order Number: [id number]    - Patient Instructions: To schedule this appointment, please contact Central Scheduling at [phone]  Test Name Result Flag Reference   US THYROID (Report)     THYROID ULTRASOUND     INDICATION: Hyperthyroidism     COMPARISON: None  TECHNIQUE:  Ultrasound of the thyroid was performed with a high frequency linear transducer in transverse and sagittal planes including volumetric imaging sweeps as well as traditional still imaging technique  FINDINGS:   Thyroid parenchyma is diffusely heterogeneous in echotexture  Right gland: 5 5 x 1 4 x 1 8 cm  There is a 5 x 3 x 5 mm colloid cyst at the midpole right lobe of the thyroid  5 x 5 x 3 mm hypoechoic well-circumscribed nodule at the upper pole right lobe of the thyroid with smooth margins  This is wider than it is tall  This was measured on cine images and measures 8 x 9 x 5 mm hypoechoic nodule which is wider than it is    tall with smooth margins at the lower pole right lobe of the thyroid  This does not meet criteria for fine needle aspiration at this time  Left gland: 5 5 x 1 4 x 1 2 cm  No dominant nodules  Isthmus: The isthmus is 0 3 cm in AP dimension  IMPRESSION:      Heterogeneous appearance of the thyroid gland with subcentimeter right-sided thyroid nodules   These do not meet criteria for fine needle aspiration at this time  Consider yearly follow-up exam to confirm stability of these findings  Workstation performed: [id number]     Signed by:   Nely Lester MD   10/4/17     (1) T4, FREE [id number] 03:30PM [place] Esquivel     Test Name Result Flag Reference   T4, FREE 1 9 ng/dL H 0 8-1 8     (Q) TSH, 3RD GENERATION [id number] 03:30PM [place] Esquivel     Test Name Result Flag Reference   TSH 0 01 mIU/L L    Reference Range                         > or = 20 Years  0 40-4 50                              Pregnancy Ranges            First trimester    0 26-2 66            Second trimester   0 55-2 73            Third trimester    0 43-2 91     Assessment    1  Multiple thyroid nodules (241 1) (E04 2)   2  Low TSH level (794 5) (R94 6)   3  Pregnancy (V22 2) (Z34 90)    Plan  Multiple thyroid nodules    · (1) T4, FREE; Status:Active; Requested for:24Nov2017;    Perform:[facility]; DEYANIRA:[id number]; Ordered; For:Multiple thyroid nodules; Ordered By:Kulwant Forman;   · (1) TSH; Status:Active; Requested for:24Nov2017;    Perform:[facility]; WWM:[id number]; Ordered; For:Multiple thyroid nodules; Ordered By:Kulwant Forman;   · US THYROID; Status:Active; Requested FDF:[id number]; Perform:[facility]; XDT:[id number]; Last Updated Eriberto Man; 11/9/2017 10:53:31 AM;Ordered; For:Multiple thyroid nodules; Ordered By:Kulwant Forman;   · Follow-up visit in 6 months Evaluation and Treatment  Follow-up  Status: Complete   Done: 77FNS9242   Ordered; For: Multiple thyroid nodules; Ordered By: Regulo Nava Performed:  Due: 01YMK7427; Last Updated By: Danielle Cline; 11/9/2017 10:53:18 AM    Discussion/Summary   thyroid nodules -thyroid ultrasound reviewed with the patient-thyroid nodules currently do not meet criteria for fine-needle aspiration biopsy    Will repeat a thyroid ultrasound after she delivers  to monitor for any changes in the size or characteristics of the nodules   low TSH level- likely related to pregnancy, TSH has improved, will monitor and repeat thyroid function test in the next month  If normal then once a trimester and recheck after she delivers   3  Pregnancy -progressing well, continue follow-up with Ob   The patient was counseled regarding diagnostic results, instructions for management, risk factor reductions, impressions, risks and benefits of treatment options, importance of compliance with treatment  The treatment plan was reviewed with the patient/guardian  The patient/guardian understands and agrees with the treatment plan   Patient is able to Self-Care  Thank you very much for allowing me to participate in the care of this patient  If you have any questions, please do not hesitate to contact me        Future Appointments    Signatures   Electronically signed by : NELIDA Gaming ; Nov 9 2017 12:39PM EST                       (Author)

## 2018-01-14 VITALS
HEIGHT: 64 IN | DIASTOLIC BLOOD PRESSURE: 64 MMHG | SYSTOLIC BLOOD PRESSURE: 110 MMHG | WEIGHT: 127 LBS | BODY MASS INDEX: 21.68 KG/M2

## 2018-01-15 ENCOUNTER — GENERIC CONVERSION - ENCOUNTER (OUTPATIENT)
Dept: OTHER | Facility: OTHER | Age: 33
End: 2018-01-15

## 2018-01-17 NOTE — RESULT NOTES
Discussion/Summary   tsh normalized , continue to monitor - repeat tsh and free t4 in 6-8 weeks      Verified Results  (1) T4, FREE [id number] 11:37AM Element ID     Test Name Result Flag Reference   T4, FREE 1 2 ng/dL  0 8-1 8     (Q) TSH, 3RD GENERATION [id number] 11:37AM Element ID     Test Name Result Flag Reference   TSH 0 41 mIU/L     Reference Range                         > or = 20 Years  0 40-4 50                              Pregnancy Ranges            First trimester    0 26-2 66            Second trimester   0 55-2 73            Third trimester    0 43-2 91

## 2018-01-22 VITALS
WEIGHT: 138 LBS | BODY MASS INDEX: 23.56 KG/M2 | SYSTOLIC BLOOD PRESSURE: 122 MMHG | HEIGHT: 64 IN | DIASTOLIC BLOOD PRESSURE: 68 MMHG

## 2018-01-22 VITALS — BODY MASS INDEX: 21.97 KG/M2 | SYSTOLIC BLOOD PRESSURE: 130 MMHG | DIASTOLIC BLOOD PRESSURE: 70 MMHG | WEIGHT: 128 LBS

## 2018-01-22 VITALS
SYSTOLIC BLOOD PRESSURE: 110 MMHG | BODY MASS INDEX: 22.02 KG/M2 | WEIGHT: 129 LBS | DIASTOLIC BLOOD PRESSURE: 64 MMHG | HEIGHT: 64 IN

## 2018-01-22 VITALS
HEART RATE: 61 BPM | DIASTOLIC BLOOD PRESSURE: 71 MMHG | BODY MASS INDEX: 23.56 KG/M2 | SYSTOLIC BLOOD PRESSURE: 119 MMHG | HEIGHT: 64 IN | WEIGHT: 138.01 LBS

## 2018-01-23 NOTE — PROGRESS NOTES
DEC 1 2017         RE: Ashely Avery                                     To: [facility]   MR#: [id number]                                    [address]   :  [facility], [facility]   ENC: [id number]                             Fax: [phone]   (Exam #: [id number])      The LMP of this 33year old,  G1, P0-0-0-0 patient was 2017, her   working JASON is 2018 and the current gestational age is 25 weeks 0   days by  [facility] [address]  A sonographic examination was performed on DEC   1 2017 using real time equipment  The ultrasound examination was performed   using abdominal & vaginal techniques  The patient has a BMI of 23 7  Her   blood pressure today was 119/71  Earliest US on record: 17  8w5d  JASNO 18      Thank you very much for your kind referral of Samia Pedraza to the   [address] [place] [address] in [place] on 2017 for level II ultrasound   evaluation and MFM assessment  Collins Dickinson is a 81-year-old primigravida who   is currently at 20-0/7 weeks gestation by an estimated due date of 2018 which is based upon first trimester ultrasound dating  Her   prenatal course so far has been unremarkable  Collins Dickinson has no complaints    reports fetal movement and denies vaginal bleeding  She recently   received the influenza vaccine  She declined genetic screening earlier in   the pregnancy      Collins Dickinson has a history of unspecified thyroid disease  By history, she   has been evaluated for both hyperthyroidism and hypothyroidism in the   past, though she has not required thyroid surgery  She apparently has a   history of thyroid nodules, not requiring needle biopsy  She was treated   for a short time in  with methimazole for the indication of   hyperthyroidism  Collins Dickinson has recently been evaluated by Dr Karthik Reyes of   Endocrinology   A TSH level last week was 0 41 mIU/L, with a free T4 value   of 1 2 ng/dL  Thyroid ultrasound evaluation is planned after her delivery  Isaak Hernández otherwise has unremarkable past medical and surgical histories   with the exception of wisdom teeth removal  She currently takes no   medication with the exception of a prenatal vitamin on a daily basis  She   denies tobacco, alcohol, or illicit drug use during the pregnancy  Her dad   has hypertension  The family medical history is otherwise negative with   respect to first degree relatives with hypertension, diabetes, venous   thromboembolism  The family genetic history is negative with respect to   genetic abnormalities, birth defects, or mental retardation  Cardiac motion was observed at 142 bpm       INDICATIONS      fetal anatomical survey      Exam Types      Transvaginal   LEVEL II      RESULTS      Fetus # 1 of 1   Vertex presentation   Fetal growth appeared normal   Placenta Location = Anterior   No placenta previa   Placenta Grade = I      MEASUREMENTS (* Included In Average GA)      AC              14 1 cm        19 weeks 1 day * (35%)   BPD              4 6 cm        19 weeks 6 days* (49%)   HC              16 8 cm        19 weeks 3 days* (30%)   Femur            3 3 cm        20 weeks 3 days* (48%)      Nuchal Fold      4 6 mm      Humerus          3 0 cm        20 weeks 0 days  (54%)      Cerebellum       2 0 cm        19 weeks 4 days   Biorbit          2 9 cm        18 weeks 6 days   CisternaMagna    3 8 mm      HC/AC           1 20   FL/AC           0 23   FL/BPD          0 71   EFW (Ac/Fl/Hc)   308 grams - 0 lbs 11 oz      THE AVERAGE GESTATIONAL AGE is 19 weeks 5 days +/- 10 days  AMNIOTIC FLUID      Amniotic Fluid: Normal      CERVICAL EVALUATION      The cervix appeared normal (Ultrasound Examination)  SUPINE      Cervical Length: 3 70 cm      OTHER TEST RESULTS           Funneling?: No             Dynamic Changes?: No        Resp   To TFP?: No                      Debris?: No      ANATOMY      Head See Details   Face/Neck                               See Details   Th  Cav  Normal   Heart                                   See Details   Abd  Cav  Normal   Stomach                                 Normal   Right Kidney                            Normal   Left Kidney                             Normal   Bladder                                 Normal   Abd  Wall                               Normal   Spine                                   Normal   Extrems                                 See Details   Genitalia                               Normal   Placenta                                Normal   Umbl  Cord                              Normal   Uterus                                  Normal   PCI                                     Normal      ANATOMY DETAILS      Visualized Appearing Sonographically Normal:   HEAD: (Calvarium, BPD Level, Lateral Ventricles, Choroid Plexus,   Cerebellum, Cisterna Magna);    FACE/NECK: (Neck, Nuchal Fold, Orbits,   Nose/Lips, Palate, Face);    TH  CAV  : (Lungs, Diaphragm); HEART: (Four   Chamber View, Proximal Left Outflow, Proximal Right Outflow, 3VV, 3 Vessel   Trachea, Short Axis of Greater Vessels, Ductal Arch, Aortic Arch,   Interventricular Septum, Interatrial Septum, Cardiac Axis, Cardiac   Position);    ABD  CAV : (Liver);    STOMACH, RIGHT KIDNEY, LEFT KIDNEY,   BLADDER, ABD  WALL, SPINE: (Cervical Spine, Thoracic Spine, Lumbar Spine,   Sacrum);    EXTREMS: (Lt Humerus, Rt Humerus, Lt Forearm, Rt Forearm, Lt   Femur, Rt Femur, Lt Low Leg, Lt Foot);    GENITALIA, PLACENTA, UMBL  CORD,   UTERUS, PCI      Suboptimally Visualized:   EXTREMS: (Lt Hand, Rt Hand, Rt Low Leg, Rt Foot)      Not Visualized:   HEAD: (Cavum);    FACE/NECK: (Profile); HEART: (IVC, SVC)      ADNEXA      The left ovary appeared normal and measured 4 6 x 2 9 x 2 6 cm with a   volume of 18 1 cc   The right ovary appeared normal and measured 3 3 x 2 4   x 1 5 cm with a volume of 6 2 cc  IMPRESSION      Gibson IUP   19 weeks and 5 days by this ultrasound  (JASON=APR 22 2018)   20 weeks and 0 days by 1st Tri Sono  (JASON=APR 20 2018)   Vertex presentation   Fetal growth appeared normal   Regular fetal heart rate of 142 bpm   Anterior placenta   No placenta previa      GENERAL COMMENT      No fetal structural abnormality or ultrasound marker for aneuploidy is   identified on the Level II ultrasound study today  Suboptimal imaging of   the distal right lower extremity, hands, facial profile, nasal bone, cavum   septum pellucidum, IVC, and SVC is afforded by the constraints related to   unfavorable fetal position  Fetal growth and amniotic fluid volume are   normal   The placenta is normal in appearance  The cervix is normal in appearance by transvaginal sonography  The   cervical length is normal   Cervical debris is not present  Cervical   funneling is not present  Neither provocative nor dynamic change is   appreciated  Today's ultrasound findings and suggested follow-up were discussed in   detail with Jung Gomezalex  We discussed that prenatal ultrasound cannot rule   out all congenital abnormalities  Jung Vargas will return to the [facility] [place] operated by [facility] in the early third trimester to reassess fetal interval growth for   the indication of a history of thyroid disease and evaluate anatomic   targets not imaged well today  Serial thyroid function testing is   recommended during this pregnancy, as well as Endocrinology comanagement  The face to face time, in addition to time spent discussing ultrasound   results, was approximately 10 minutes, greater than 50% of which was spent   during counseling and coordination of care  JESUS MANUEL Liu M D     Maternal-Fetal Medicine   Electronically signed 12/02/17 09:24

## 2018-01-23 NOTE — CONSULTS
I had the pleasure of evaluating your patient, Genoveva Taylor  My full evaluation follows:      Chief Complaint  Here for ultrasound study      History of Present Illness  Please refer to the ultrasound report for additional information  Active Problems    1  Abnormal thyroid screen (blood) (794 5) (R94 6)   2  Dysuria (788 1) (R30 0)   3  Encounter for prenatal care of first pregnancy, second trimester (V22 0) (Z34 02)   4  History of hyperthyroidism (V12 29) (Z86 39)   5  Low TSH level (794 5) (R94 6)   6  Multiple thyroid nodules (241 1) (E04 2)   7  Pregnancy (V22 2) (Z34 90)    Past Medical History    · History of Contraceptive use (V25 40) (Z30 40)   · History of amenorrhea (V13 29) (Z87 42)   · History of irregular menstrual cycles (V13 29) (Z87 42)   · History of thyroid disease (V12 29) (Z86 39)   · History of Menarche (V21 8)   · History of Spotting in pregnancy, antepartum condition or complication (306 88)  (X75 541)   · History of Visit for routine gyn exam (V72 31) (Z01 419)    Family History    · Family history of malignant neoplasm of female breast (V16 3) (Z80 3)   · Family history of thyroid disease (V18 19) (Z83 49)    · Family history of thyroid disease (V18 19) (Z83 49)    · Family history of diabetes mellitus (V18 0) (Z83 3)    · Family history of diabetes mellitus (V18 0) (Z83 3)    Social History    · Daily caffeine consumption   · Engaged to be    · Monogamous relationship   · Never a smoker   · No drug use   · Occupation   · TEACHER   · Primary spoken language English   · Racial background   · WHITE   · Social alcohol use (Z78 9)    Current Meds   1  Calcium CHEW;   Therapy: (Recorded:74Muc5005) to Recorded   2  Prenatal One Daily TABS; Therapy: (Recorded:24Mar2017) to Recorded    Allergies    1  No Known Drug Allergies    2  No Known Environmental Allergies   3   No Known Food Allergies    Vitals   Recorded: [id number] 02:16PM   Heart Rate 61   Systolic 697, LUE, Sitting Diastolic 71, LUE, Sitting   Height 5 ft 4 in   Weight 138 lb 0 2 oz   BMI Calculated 23 69   BSA Calculated 1 67   Pain Scale 0     Results/Data  Exam description: level II obstetrical ultrasound, transvaginal obstetrical ultrasound  Findings: Please refer to the ultrasound report for additional information  Discussion/Summary    Please refer to the ultrasound report for additional information  The patient was counseled regarding diagnostic results, instructions for management, prognosis, impressions  Thank you very much for allowing me to participate in the care of this patient  If you have any questions, please do not hesitate to contact me        Future Appointments    Signatures   Electronically signed by : NELIDA Webb ; Dec  2 2017  9:16AM EST                       (Author)

## 2018-01-24 VITALS
HEIGHT: 64 IN | DIASTOLIC BLOOD PRESSURE: 60 MMHG | WEIGHT: 138 LBS | SYSTOLIC BLOOD PRESSURE: 98 MMHG | BODY MASS INDEX: 23.56 KG/M2

## 2018-01-24 VITALS — DIASTOLIC BLOOD PRESSURE: 68 MMHG | BODY MASS INDEX: 24.37 KG/M2 | SYSTOLIC BLOOD PRESSURE: 122 MMHG | WEIGHT: 142 LBS

## 2018-01-24 VITALS
HEIGHT: 64 IN | SYSTOLIC BLOOD PRESSURE: 112 MMHG | WEIGHT: 143 LBS | BODY MASS INDEX: 24.41 KG/M2 | DIASTOLIC BLOOD PRESSURE: 64 MMHG

## 2018-01-28 LAB
BASOPHILS # BLD AUTO: 41 CELLS/UL (ref 0–200)
BASOPHILS NFR BLD AUTO: 0.4 %
EOSINOPHIL # BLD AUTO: 163 CELLS/UL (ref 15–500)
EOSINOPHIL NFR BLD AUTO: 1.6 %
ERYTHROCYTE [DISTWIDTH] IN BLOOD BY AUTOMATED COUNT: 11.9 % (ref 11–15)
GLUCOSE 1H P 50 G GLC PO SERPL-MCNC: 83 MG/DL
HCT VFR BLD AUTO: 36.1 % (ref 35–45)
HGB BLD-MCNC: 12 G/DL (ref 11.7–15.5)
LYMPHOCYTES # BLD AUTO: 1540 CELLS/UL (ref 850–3900)
LYMPHOCYTES NFR BLD AUTO: 15.1 %
MCH RBC QN AUTO: 29.7 PG (ref 27–33)
MCHC RBC AUTO-ENTMCNC: 33.2 G/DL (ref 32–36)
MCV RBC AUTO: 89.4 FL (ref 80–100)
MONOCYTES # BLD AUTO: 775 CELLS/UL (ref 200–950)
MONOCYTES NFR BLD AUTO: 7.6 %
NEUTROPHILS # BLD AUTO: 7681 CELLS/UL (ref 1500–7800)
NEUTROPHILS NFR BLD AUTO: 75.3 %
PLATELET # BLD AUTO: 193 THOUSAND/UL (ref 140–400)
PMV BLD REES-ECKER: 11.7 FL (ref 7.5–12.5)
RBC # BLD AUTO: 4.04 MILLION/UL (ref 3.8–5.1)
T4 FREE SERPL-MCNC: 1 NG/DL (ref 0.8–1.8)
TSH SERPL-ACNC: 0.73 MIU/L
WBC # BLD AUTO: 10.2 THOUSAND/UL (ref 3.8–10.8)

## 2018-02-01 ENCOUNTER — OB ABSTRACT (OUTPATIENT)
Dept: OBGYN CLINIC | Facility: CLINIC | Age: 33
End: 2018-02-01

## 2018-02-02 ENCOUNTER — ROUTINE PRENATAL (OUTPATIENT)
Dept: OBGYN CLINIC | Facility: CLINIC | Age: 33
End: 2018-02-02
Payer: COMMERCIAL

## 2018-02-02 VITALS — DIASTOLIC BLOOD PRESSURE: 60 MMHG | SYSTOLIC BLOOD PRESSURE: 98 MMHG | BODY MASS INDEX: 24.89 KG/M2 | WEIGHT: 145 LBS

## 2018-02-02 DIAGNOSIS — Z3A.29 29 WEEKS GESTATION OF PREGNANCY: ICD-10-CM

## 2018-02-02 DIAGNOSIS — R30.0 DYSURIA: Primary | ICD-10-CM

## 2018-02-02 LAB
SL AMB  POCT GLUCOSE, UA: ABNORMAL
SL AMB LEUKOCYTE ESTERASE,UA: ABNORMAL
SL AMB POCT BILIRUBIN,UA: ABNORMAL
SL AMB POCT BLOOD,UA: ABNORMAL
SL AMB POCT KETONES,UA: ABNORMAL
SL AMB POCT NITRITE,UA: ABNORMAL
SL AMB POCT PH,UA: 6
SL AMB POCT SPECIFIC GRAVITY,UA: 1.02
SL AMB POCT URINE PROTEIN: ABNORMAL

## 2018-02-02 PROCEDURE — PNV: Performed by: OBSTETRICS & GYNECOLOGY

## 2018-02-02 PROCEDURE — 81003 URINALYSIS AUTO W/O SCOPE: CPT | Performed by: OBSTETRICS & GYNECOLOGY

## 2018-02-02 RX ORDER — ACETAMINOPHEN 500 MG
TABLET ORAL
COMMUNITY
End: 2018-09-25

## 2018-02-02 RX ORDER — PNV NO.95/FERROUS FUM/FOLIC AC 28MG-0.8MG
TABLET ORAL
COMMUNITY
End: 2020-08-24

## 2018-02-02 NOTE — PROGRESS NOTES
Visit:  Good FM - did have an upset stomach earlier in the week but improved now -  Tolerating PNV and has f/u at [facility] - some mild cramping with increased activity - reviewed rest and BH  - given packet for checking in visit

## 2018-02-04 LAB — BACTERIA UR CULT: NORMAL

## 2018-02-17 ENCOUNTER — ROUTINE PRENATAL (OUTPATIENT)
Dept: OBGYN CLINIC | Facility: CLINIC | Age: 33
End: 2018-02-17

## 2018-02-17 VITALS — WEIGHT: 150 LBS | SYSTOLIC BLOOD PRESSURE: 110 MMHG | BODY MASS INDEX: 25.75 KG/M2 | DIASTOLIC BLOOD PRESSURE: 66 MMHG

## 2018-02-17 DIAGNOSIS — Z3A.31 31 WEEKS GESTATION OF PREGNANCY: ICD-10-CM

## 2018-02-17 PROCEDURE — PNV: Performed by: OBSTETRICS & GYNECOLOGY

## 2018-02-22 ENCOUNTER — ULTRASOUND (OUTPATIENT)
Dept: PERINATAL CARE | Facility: CLINIC | Age: 33
End: 2018-02-22
Payer: COMMERCIAL

## 2018-02-22 VITALS
BODY MASS INDEX: 26.46 KG/M2 | DIASTOLIC BLOOD PRESSURE: 80 MMHG | SYSTOLIC BLOOD PRESSURE: 121 MMHG | HEIGHT: 64 IN | HEART RATE: 76 BPM | WEIGHT: 155 LBS

## 2018-02-22 DIAGNOSIS — Z3A.31 31 WEEKS GESTATION OF PREGNANCY: ICD-10-CM

## 2018-02-22 DIAGNOSIS — Z36.2 ENCOUNTER FOR OTHER ANTENATAL SCREENING FOLLOW-UP: Primary | ICD-10-CM

## 2018-02-22 PROBLEM — R79.89 ABNORMAL THYROID SCREEN (BLOOD): Status: ACTIVE | Noted: 2017-09-26

## 2018-02-22 PROBLEM — E04.2 MULTIPLE THYROID NODULES: Status: ACTIVE | Noted: 2017-11-09

## 2018-02-22 PROBLEM — R79.89 LOW TSH LEVEL: Status: ACTIVE | Noted: 2017-11-09

## 2018-02-22 PROCEDURE — 99212 OFFICE O/P EST SF 10 MIN: CPT | Performed by: OBSTETRICS & GYNECOLOGY

## 2018-02-22 PROCEDURE — 76816 OB US FOLLOW-UP PER FETUS: CPT | Performed by: OBSTETRICS & GYNECOLOGY

## 2018-02-22 NOTE — PATIENT INSTRUCTIONS

## 2018-02-22 NOTE — PROGRESS NOTES
The patient was seen today for an ultrasound and NST  Please see ultrasound report (located under OB Procedures tab) for additional details

## 2018-02-28 ENCOUNTER — TELEPHONE (OUTPATIENT)
Dept: OBGYN CLINIC | Facility: CLINIC | Age: 33
End: 2018-02-28

## 2018-02-28 NOTE — TELEPHONE ENCOUNTER
Pt called back, confirmed did have chicken pox as a child  Was more concerned over shingles virus  Aware is own reactivation of chicken pox, so she is good

## 2018-03-02 ENCOUNTER — ROUTINE PRENATAL (OUTPATIENT)
Dept: OBGYN CLINIC | Facility: CLINIC | Age: 33
End: 2018-03-02

## 2018-03-02 VITALS — BODY MASS INDEX: 26.26 KG/M2 | WEIGHT: 153 LBS | SYSTOLIC BLOOD PRESSURE: 112 MMHG | DIASTOLIC BLOOD PRESSURE: 76 MMHG

## 2018-03-02 DIAGNOSIS — Z3A.33 33 WEEKS GESTATION OF PREGNANCY: Primary | ICD-10-CM

## 2018-03-02 PROCEDURE — PNV: Performed by: NURSE PRACTITIONER

## 2018-03-02 NOTE — PROGRESS NOTES
OFFICE VISIT: Denies any N/V, HA, Cramping, VB, LOF, Edema, Domestic Violence, Smoking  + FM  Tolerating PNV  RTO 2 weeks for GBS

## 2018-03-07 NOTE — PROGRESS NOTES
Education  Baby & Me Education 1st Trimester:   First Trimester Education provided:   benefits of breastfeeding, importance of exclusive breastfeeding, early initiation of breastfeeding, exclusive breastfeeding for the first 6 months, Pregnancy Essentials Reference Guide given and Other education given:      Prenatal education provided by: Taco Ordaz PA-C      Signatures   Electronically signed by : Taco Ordaz, [facility]; Sep 13 2017  2:13PM EST                       (Author)

## 2018-03-17 ENCOUNTER — ROUTINE PRENATAL (OUTPATIENT)
Dept: OBGYN CLINIC | Facility: CLINIC | Age: 33
End: 2018-03-17

## 2018-03-17 VITALS — SYSTOLIC BLOOD PRESSURE: 98 MMHG | BODY MASS INDEX: 26.95 KG/M2 | DIASTOLIC BLOOD PRESSURE: 66 MMHG | WEIGHT: 157 LBS

## 2018-03-17 DIAGNOSIS — Z34.93 NORMAL PREGNANCY IN THIRD TRIMESTER: Primary | ICD-10-CM

## 2018-03-17 DIAGNOSIS — Z3A.35 35 WEEKS GESTATION OF PREGNANCY: ICD-10-CM

## 2018-03-17 LAB — EXTERNAL GROUP B STREP ANTIGEN: NEGATIVE

## 2018-03-17 PROCEDURE — PNV: Performed by: OBSTETRICS & GYNECOLOGY

## 2018-03-19 LAB — GP B STREP DNA SPEC QL NAA+PROBE: NEGATIVE

## 2018-03-22 ENCOUNTER — ROUTINE PRENATAL (OUTPATIENT)
Dept: OBGYN CLINIC | Facility: CLINIC | Age: 33
End: 2018-03-22

## 2018-03-22 VITALS — DIASTOLIC BLOOD PRESSURE: 74 MMHG | WEIGHT: 157 LBS | SYSTOLIC BLOOD PRESSURE: 124 MMHG | BODY MASS INDEX: 26.95 KG/M2

## 2018-03-22 DIAGNOSIS — Z3A.35 35 WEEKS GESTATION OF PREGNANCY: Primary | ICD-10-CM

## 2018-03-22 PROCEDURE — PNV: Performed by: NURSE PRACTITIONER

## 2018-03-22 NOTE — PROGRESS NOTES
OFFICE VISIT: Mild occasional cramping  Denies any N/V, HA,VB, LOF, Edema, Domestic Violence, Smoking  + FM  Tolerating PNV  GBS Negative  Labor talk done  RTO 1 week

## 2018-03-30 ENCOUNTER — ROUTINE PRENATAL (OUTPATIENT)
Dept: OBGYN CLINIC | Facility: CLINIC | Age: 33
End: 2018-03-30

## 2018-03-30 VITALS
SYSTOLIC BLOOD PRESSURE: 116 MMHG | HEIGHT: 64 IN | WEIGHT: 155 LBS | DIASTOLIC BLOOD PRESSURE: 72 MMHG | BODY MASS INDEX: 26.46 KG/M2

## 2018-03-30 DIAGNOSIS — Z34.03 ENCOUNTER FOR SUPERVISION OF NORMAL FIRST PREGNANCY IN THIRD TRIMESTER: Primary | ICD-10-CM

## 2018-03-30 PROBLEM — Z3A.35 35 WEEKS GESTATION OF PREGNANCY: Status: RESOLVED | Noted: 2018-03-17 | Resolved: 2018-03-30

## 2018-03-30 PROCEDURE — PNV: Performed by: PHYSICIAN ASSISTANT

## 2018-03-30 NOTE — PROGRESS NOTES
VISIT: (+) nausea - mild/intermittant; (+) cramping - dull period cramping with some low back pain that resolves on own; Denies v/HA/vb/lof/edema/dv/smoking  PNVs + DHA - tolerating daily  Good FM - r/isrrael 10 kicks/2 hrs   Tdap done    No change in cervix, however, baby has dropped  Reviewed signs and symptoms of labor and when to call; Reviewed signs and symptoms of pregnancy induced hypertension or preeclampsia and when to call  RTO in 1 weeks for routine ob check or sooner if needed

## 2018-04-05 ENCOUNTER — ROUTINE PRENATAL (OUTPATIENT)
Dept: OBGYN CLINIC | Facility: CLINIC | Age: 33
End: 2018-04-05

## 2018-04-05 VITALS — WEIGHT: 159 LBS | BODY MASS INDEX: 27.29 KG/M2 | SYSTOLIC BLOOD PRESSURE: 102 MMHG | DIASTOLIC BLOOD PRESSURE: 78 MMHG

## 2018-04-05 DIAGNOSIS — Z34.03 ENCOUNTER FOR SUPERVISION OF NORMAL FIRST PREGNANCY IN THIRD TRIMESTER: Primary | ICD-10-CM

## 2018-04-05 PROCEDURE — PNV: Performed by: NURSE PRACTITIONER

## 2018-04-05 NOTE — PROGRESS NOTES
OFFICE VISIT: Pt feeling well waiting on baby  Hospital bags packed  Pt states occasional contractions  Denies any N/V, HA,  VB, LOF, Edema, Domestic Violence, Smoking  + FM  Tolerating PNV  Labor precautions reviewed again, pt aware of when to call  RTO 1 week or sooner as needed

## 2018-04-11 ENCOUNTER — HOSPITAL ENCOUNTER (INPATIENT)
Facility: HOSPITAL | Age: 33
LOS: 1 days | Discharge: HOME | End: 2018-04-13
Attending: OBSTETRICS & GYNECOLOGY | Admitting: OBSTETRICS & GYNECOLOGY
Payer: COMMERCIAL

## 2018-04-11 DIAGNOSIS — Z3A.38 38 WEEKS GESTATION OF PREGNANCY: Primary | ICD-10-CM

## 2018-04-12 ENCOUNTER — ANESTHESIA EVENT (INPATIENT)
Dept: LABOR AND DELIVERY | Facility: HOSPITAL | Age: 33
End: 2018-04-12
Payer: COMMERCIAL

## 2018-04-12 ENCOUNTER — ANESTHESIA (INPATIENT)
Dept: LABOR AND DELIVERY | Facility: HOSPITAL | Age: 33
End: 2018-04-12
Payer: COMMERCIAL

## 2018-04-12 PROBLEM — Z3A.38 38 WEEKS GESTATION OF PREGNANCY: Status: ACTIVE | Noted: 2018-04-12

## 2018-04-12 PROBLEM — O42.90 PREMATURE RUPTURE OF MEMBRANES: Status: ACTIVE | Noted: 2018-04-12

## 2018-04-12 LAB
ABO GROUP BLD: NORMAL
BASE EXCESS BLDCOA CALC-SCNC: -1.1 MMOL/L (ref 3–11)
BASE EXCESS BLDCOV CALC-SCNC: -1.3 MMOL/L (ref 1–9)
BASOPHILS # BLD AUTO: 0.02 THOUSANDS/ΜL (ref 0–0.1)
BASOPHILS NFR BLD AUTO: 0 % (ref 0–1)
BLD GP AB SCN SERPL QL: NEGATIVE
CHLAMYDIA DNA CVX QL NAA+PROBE: NORMAL
EOSINOPHIL # BLD AUTO: 0.18 THOUSAND/ΜL (ref 0–0.61)
EOSINOPHIL NFR BLD AUTO: 1 % (ref 0–6)
ERYTHROCYTE [DISTWIDTH] IN BLOOD BY AUTOMATED COUNT: 13 % (ref 11.6–15.1)
HCO3 BLDCOA-SCNC: 25.9 MMOL/L (ref 17.3–27.3)
HCO3 BLDCOV-SCNC: 23.6 MMOL/L (ref 12.2–28.6)
HCT VFR BLD AUTO: 34.5 % (ref 34.8–46.1)
HGB BLD-MCNC: 11.3 G/DL (ref 11.5–15.4)
LYMPHOCYTES # BLD AUTO: 2.16 THOUSANDS/ΜL (ref 0.6–4.47)
LYMPHOCYTES NFR BLD AUTO: 17 % (ref 14–44)
MCH RBC QN AUTO: 26.3 PG (ref 26.8–34.3)
MCHC RBC AUTO-ENTMCNC: 32.8 G/DL (ref 31.4–37.4)
MCV RBC AUTO: 80 FL (ref 82–98)
MONOCYTES # BLD AUTO: 0.99 THOUSAND/ΜL (ref 0.17–1.22)
MONOCYTES NFR BLD AUTO: 8 % (ref 4–12)
N GONORRHOEA DNA GENITAL QL NAA+PROBE: NORMAL
NEUTROPHILS # BLD AUTO: 9.28 THOUSANDS/ΜL (ref 1.85–7.62)
NEUTS SEG NFR BLD AUTO: 74 % (ref 43–75)
NRBC BLD AUTO-RTO: 0 /100 WBCS
O2 CT VFR BLDCOA CALC: 5 ML/DL
OXYHGB MFR BLDCOA: 24.3 %
OXYHGB MFR BLDCOV: 62.6 %
PCO2 BLDCOA: 52.2 MM[HG] (ref 30–60)
PCO2 BLDCOV: 40.8 MM HG (ref 27–43)
PH BLDCOA: 7.31 [PH] (ref 7.23–7.43)
PH BLDCOV: 7.38 [PH] (ref 7.19–7.49)
PLATELET # BLD AUTO: 200 THOUSANDS/UL (ref 149–390)
PMV BLD AUTO: 12 FL (ref 8.9–12.7)
PO2 BLDCOA: 15.6 MM HG (ref 5–25)
PO2 BLDCOV: 28.2 MM HG (ref 15–45)
RBC # BLD AUTO: 4.29 MILLION/UL (ref 3.81–5.12)
RH BLD: POSITIVE
RPR SER QL: NORMAL
SAO2 % BLDCOV: 13.3 ML/DL
SPECIMEN EXPIRATION DATE: NORMAL
WBC # BLD AUTO: 12.68 THOUSAND/UL (ref 4.31–10.16)

## 2018-04-12 PROCEDURE — 87591 N.GONORRHOEAE DNA AMP PROB: CPT | Performed by: OBSTETRICS & GYNECOLOGY

## 2018-04-12 PROCEDURE — 0HQ9XZZ REPAIR PERINEUM SKIN, EXTERNAL APPROACH: ICD-10-PCS | Performed by: OBSTETRICS & GYNECOLOGY

## 2018-04-12 PROCEDURE — 4A1HXCZ MONITORING OF PRODUCTS OF CONCEPTION, CARDIAC RATE, EXTERNAL APPROACH: ICD-10-PCS | Performed by: OBSTETRICS & GYNECOLOGY

## 2018-04-12 PROCEDURE — G0463 HOSPITAL OUTPT CLINIC VISIT: HCPCS

## 2018-04-12 PROCEDURE — 87491 CHLMYD TRACH DNA AMP PROBE: CPT | Performed by: OBSTETRICS & GYNECOLOGY

## 2018-04-12 PROCEDURE — 86592 SYPHILIS TEST NON-TREP QUAL: CPT | Performed by: OBSTETRICS & GYNECOLOGY

## 2018-04-12 PROCEDURE — 86900 BLOOD TYPING SEROLOGIC ABO: CPT | Performed by: OBSTETRICS & GYNECOLOGY

## 2018-04-12 PROCEDURE — 99214 OFFICE O/P EST MOD 30 MIN: CPT

## 2018-04-12 PROCEDURE — 3E033VJ INTRODUCTION OF OTHER HORMONE INTO PERIPHERAL VEIN, PERCUTANEOUS APPROACH: ICD-10-PCS | Performed by: OBSTETRICS & GYNECOLOGY

## 2018-04-12 PROCEDURE — 59400 OBSTETRICAL CARE: CPT | Performed by: OBSTETRICS & GYNECOLOGY

## 2018-04-12 PROCEDURE — 86901 BLOOD TYPING SEROLOGIC RH(D): CPT | Performed by: OBSTETRICS & GYNECOLOGY

## 2018-04-12 PROCEDURE — 82805 BLOOD GASES W/O2 SATURATION: CPT | Performed by: OBSTETRICS & GYNECOLOGY

## 2018-04-12 PROCEDURE — 85025 COMPLETE CBC W/AUTO DIFF WBC: CPT | Performed by: OBSTETRICS & GYNECOLOGY

## 2018-04-12 PROCEDURE — 86850 RBC ANTIBODY SCREEN: CPT | Performed by: OBSTETRICS & GYNECOLOGY

## 2018-04-12 RX ORDER — DOCUSATE SODIUM 100 MG/1
100 CAPSULE, LIQUID FILLED ORAL 2 TIMES DAILY
Status: DISCONTINUED | OUTPATIENT
Start: 2018-04-12 | End: 2018-04-13 | Stop reason: HOSPADM

## 2018-04-12 RX ORDER — LIDOCAINE HYDROCHLORIDE AND EPINEPHRINE 15; 5 MG/ML; UG/ML
INJECTION, SOLUTION EPIDURAL AS NEEDED
Status: DISCONTINUED | OUTPATIENT
Start: 2018-04-12 | End: 2018-04-12 | Stop reason: SURG

## 2018-04-12 RX ORDER — OXYCODONE HYDROCHLORIDE AND ACETAMINOPHEN 5; 325 MG/1; MG/1
2 TABLET ORAL EVERY 4 HOURS PRN
Status: DISCONTINUED | OUTPATIENT
Start: 2018-04-12 | End: 2018-04-13 | Stop reason: HOSPADM

## 2018-04-12 RX ORDER — ONDANSETRON 2 MG/ML
4 INJECTION INTRAMUSCULAR; INTRAVENOUS EVERY 6 HOURS PRN
Status: DISCONTINUED | OUTPATIENT
Start: 2018-04-12 | End: 2018-04-12

## 2018-04-12 RX ORDER — BUTORPHANOL TARTRATE 1 MG/ML
1 INJECTION, SOLUTION INTRAMUSCULAR; INTRAVENOUS ONCE
Status: COMPLETED | OUTPATIENT
Start: 2018-04-12 | End: 2018-04-12

## 2018-04-12 RX ORDER — OXYTOCIN/RINGER'S LACTATE 30/500 ML
250 PLASTIC BAG, INJECTION (ML) INTRAVENOUS CONTINUOUS
Status: DISCONTINUED | OUTPATIENT
Start: 2018-04-12 | End: 2018-04-13 | Stop reason: HOSPADM

## 2018-04-12 RX ORDER — SODIUM CHLORIDE, SODIUM LACTATE, POTASSIUM CHLORIDE, CALCIUM CHLORIDE 600; 310; 30; 20 MG/100ML; MG/100ML; MG/100ML; MG/100ML
125 INJECTION, SOLUTION INTRAVENOUS CONTINUOUS
Status: DISCONTINUED | OUTPATIENT
Start: 2018-04-12 | End: 2018-04-12

## 2018-04-12 RX ORDER — ONDANSETRON 2 MG/ML
4 INJECTION INTRAMUSCULAR; INTRAVENOUS EVERY 8 HOURS PRN
Status: DISCONTINUED | OUTPATIENT
Start: 2018-04-12 | End: 2018-04-13 | Stop reason: HOSPADM

## 2018-04-12 RX ORDER — DIPHENHYDRAMINE HCL 25 MG
25 TABLET ORAL EVERY 6 HOURS PRN
Status: DISCONTINUED | OUTPATIENT
Start: 2018-04-12 | End: 2018-04-13 | Stop reason: HOSPADM

## 2018-04-12 RX ORDER — OXYTOCIN/RINGER'S LACTATE 30/500 ML
1-30 PLASTIC BAG, INJECTION (ML) INTRAVENOUS
Status: DISCONTINUED | OUTPATIENT
Start: 2018-04-12 | End: 2018-04-12

## 2018-04-12 RX ORDER — IBUPROFEN 600 MG/1
600 TABLET ORAL EVERY 6 HOURS PRN
Status: DISCONTINUED | OUTPATIENT
Start: 2018-04-12 | End: 2018-04-13 | Stop reason: HOSPADM

## 2018-04-12 RX ORDER — CALCIUM CARBONATE 200(500)MG
1000 TABLET,CHEWABLE ORAL DAILY PRN
Status: DISCONTINUED | OUTPATIENT
Start: 2018-04-12 | End: 2018-04-13 | Stop reason: HOSPADM

## 2018-04-12 RX ORDER — OXYCODONE HYDROCHLORIDE AND ACETAMINOPHEN 5; 325 MG/1; MG/1
1 TABLET ORAL EVERY 4 HOURS PRN
Status: DISCONTINUED | OUTPATIENT
Start: 2018-04-12 | End: 2018-04-13 | Stop reason: HOSPADM

## 2018-04-12 RX ORDER — ACETAMINOPHEN 325 MG/1
650 TABLET ORAL EVERY 6 HOURS PRN
Status: DISCONTINUED | OUTPATIENT
Start: 2018-04-12 | End: 2018-04-13 | Stop reason: HOSPADM

## 2018-04-12 RX ORDER — DIAPER,BRIEF,INFANT-TODD,DISP
1 EACH MISCELLANEOUS AS NEEDED
Status: DISCONTINUED | OUTPATIENT
Start: 2018-04-12 | End: 2018-04-13 | Stop reason: HOSPADM

## 2018-04-12 RX ADMIN — SODIUM CHLORIDE, SODIUM LACTATE, POTASSIUM CHLORIDE, AND CALCIUM CHLORIDE 125 ML/HR: .6; .31; .03; .02 INJECTION, SOLUTION INTRAVENOUS at 01:32

## 2018-04-12 RX ADMIN — SODIUM CHLORIDE, SODIUM LACTATE, POTASSIUM CHLORIDE, AND CALCIUM CHLORIDE 125 ML/HR: .6; .31; .03; .02 INJECTION, SOLUTION INTRAVENOUS at 09:51

## 2018-04-12 RX ADMIN — BUTORPHANOL TARTRATE 1 MG: 1 INJECTION, SOLUTION INTRAMUSCULAR; INTRAVENOUS at 06:57

## 2018-04-12 RX ADMIN — IBUPROFEN 600 MG: 600 TABLET ORAL at 23:39

## 2018-04-12 RX ADMIN — DOCUSATE SODIUM 100 MG: 100 CAPSULE, LIQUID FILLED ORAL at 19:33

## 2018-04-12 RX ADMIN — WITCH HAZEL: 500 SOLUTION RECTAL; TOPICAL at 21:09

## 2018-04-12 RX ADMIN — ROPIVACAINE HYDROCHLORIDE: 2 INJECTION, SOLUTION EPIDURAL; INFILTRATION at 08:00

## 2018-04-12 RX ADMIN — IBUPROFEN 600 MG: 600 TABLET ORAL at 17:39

## 2018-04-12 RX ADMIN — ONDANSETRON 4 MG: 2 INJECTION INTRAMUSCULAR; INTRAVENOUS at 07:35

## 2018-04-12 RX ADMIN — WITCH HAZEL 1 PAD: 500 SOLUTION RECTAL; TOPICAL at 21:09

## 2018-04-12 RX ADMIN — SODIUM CHLORIDE, SODIUM LACTATE, POTASSIUM CHLORIDE, AND CALCIUM CHLORIDE 125 ML/HR: .6; .31; .03; .02 INJECTION, SOLUTION INTRAVENOUS at 02:58

## 2018-04-12 RX ADMIN — Medication 2 MILLI-UNITS/MIN: at 02:44

## 2018-04-12 RX ADMIN — LIDOCAINE HYDROCHLORIDE AND EPINEPHRINE 5 ML: 15; 5 INJECTION, SOLUTION EPIDURAL at 07:51

## 2018-04-12 RX ADMIN — BENZOCAINE AND LEVOMENTHOL: 200; 5 SPRAY TOPICAL at 17:39

## 2018-04-12 NOTE — ANESTHESIA POSTPROCEDURE EVALUATION
Post-Op Assessment Note      CV Status:  Stable    Mental Status:  Alert and awake    Hydration Status:  Stable    PONV Controlled:  None    Airway Patency:  Patent    Post Op Vitals Reviewed: Yes          Staff: Anesthesiologist     Post-op block assessment: adhesive bandage applied, site cleaned, catheter intact and no complications        BP      Temp      Pulse     Resp      SpO2

## 2018-04-12 NOTE — OB LABOR/OXYTOCIN SAFETY PROGRESS
Oxytocin Safety Progress Check Note - Kellie Tenorio 29 y o  female MRN: [id number]    Unit/Bed#: -01 Encounter: [id number]    Obstetric History       T0      L0     SAB0   TAB0   Ectopic0   Multiple0   Live Births0      Gestational Age: 38w7d  Dose (joe-units/min) Oxytocin: 2 joe-units/min  Contraction Frequency (minutes): irritability, 3-5  Contraction Quality: Moderate      Dilation: Fingertip        Effacement (%): 70  Station: -1  Baseline Rate: 135 bpm  Fetal Heart Rate: 142 BPM  FHR Category: Category II          Notes/comments:       Patient not yet uncomfortable, will defer SVE at this time  Over the past 2 hours there have been 2 late decelerations, FHT currently at this time is category 1, but overall in the past two hours it is category II    Will continue pitocin titration          Anthony Bledsoe MD 2018 4:42 AM

## 2018-04-12 NOTE — DISCHARGE INSTRUCTIONS
Vaginal Delivery   WHAT YOU SHOULD KNOW:   A vaginal delivery is the birth of your baby through your vagina (birth canal)  AFTER YOU LEAVE:   Medicines:  · NSAIDs  help decrease swelling and pain or fever  This medicine is available with or without a doctor's order  NSAIDs can cause stomach bleeding or kidney problems in certain people  If you take blood thinner medicine, always ask your healthcare provider if NSAIDs are safe for you  Always read the medicine label and follow directions  · Take your medicine as directed  Call your healthcare provider if you think your medicine is not helping or if you have side effects  Tell him if you are allergic to any medicine  Keep a list of the medicines, vitamins, and herbs you take  Include the amounts, and when and why you take them  Bring the list or the pill bottles to follow-up visits  Carry your medicine list with you in case of an emergency  Follow up with your primary healthcare provider:  Most women need to return 6 weeks after a vaginal delivery  Ask about how to care for your wounds or stitches  Write down your questions so you remember to ask them during your visits  Activity:  Rest as much as possible  Try to keep all activities short  You may be able to do some exercise soon after you have your baby  Talk with your primary healthcare provider before you start exercising  If you work outside the home, ask when you can return to your job  Kegel exercises:  Kegel exercises may help your vaginal and rectal muscles heal faster  You can do Kegel exercises by tightening and relaxing the muscles around your vagina  Kegel exercises help make the muscles stronger  Breast care:  When your milk comes in, your breasts may feel full and hard  Ask how to care for your breasts, even if you are not breastfeeding  Constipation:  Do not try to push the bowel movement out if it is too hard   High-fiber foods, extra liquids, and regular exercise can help you prevent constipation  Examples of high-fiber foods are fruit and bran  Prune juice and water are good liquids to drink  Regular exercise helps your digestive system work  You may also be told to take over-the-counter fiber and stool softener medicines  Take these items as directed  Hemorrhoids:  Pregnancy can cause severe hemorrhoids  You may have rectal pain because of the hemorrhoids  Ask how to prevent or treat hemorrhoids  Perineum care: Your perineum is the area between your vagina and anus  Keep the area clean and dry to help it heal and to prevent infection  Wash the area gently with soap and water when you bathe or shower  Rinse your perineum with warm water when you use the toilet  Your primary healthcare provider may suggest you use a warm sitz bath to help decrease pain  A sitz bath is a bathtub or basin filled to hip level  Stay in the sitz bath for 20 to 30 minutes, or as directed  Vaginal discharge: You will have vaginal discharge, called lochia, after your delivery  The lochia is bright red the first day or two after the birth  By the fourth day, the amount decreases, and it turns red-brown  Use a sanitary pad rather than a tampon to prevent a vaginal infection  It is normal to have lochia up to 8 weeks after your baby is born  Monthly periods: Your period may start again within 7 to 12 weeks after your baby is born  If you are breastfeeding, it may take longer for your period to start again  You can still get pregnant again even though you do not have your monthly period  Talk with your primary healthcare provider about a birth control method that will be good for you if you do not want to get pregnant  Mood changes: Many new mothers have some kind of mood changes after delivery  Some of these changes occur because of lack of sleep, hormone changes, and caring for a new baby  Some mood changes can be more serious, such as postpartum depression   Talk with your primary healthcare provider if you feel unable to care for yourself or your baby  Sexual activity:  You may need to avoid sex for 6 to 7 weeks after you have your baby  You may notice you have a decreased desire for sex, or sex may be painful  You may need to use a vaginal lubricant (gel) to help make sex more comfortable  Contact your primary healthcare provider if:   · You have heavy vaginal bleeding that fills 1 or more sanitary pads in 1 hour  · You have a fever  · Your pain does not go away, or gets worse  · The skin between your vagina and rectum is swollen, warm, or red  · You have swollen, hard, or painful breasts  · You feel very sad or depressed  · You feel more tired than usual      · You have questions or concerns about your condition or care  Seek care immediately or call 911 if:   · You have pus or yellow drainage coming from your vagina or wound  · You are urinating very little, or not at all  · Your arm or leg feels warm, tender, and painful  It may look swollen and red  · You feel lightheaded, have sudden and worsening chest pain, or trouble breathing  You may have more pain when you take deep breaths or cough, or you may cough up blood  © 2014 [address] is for End User's use only and may not be sold, redistributed or otherwise used for commercial purposes  All illustrations and images included in CareNotes® are the copyrighted property of [facility] [facility] [employer]  or Hernesto Loredo  The above information is an  only  It is not intended as medical advice for individual conditions or treatments  Talk to your doctor, nurse or pharmacist before following any medical regimen to see if it is safe and effective for you

## 2018-04-12 NOTE — H&P
H&P Exam - Obstetrics   Magdalena Pelaez 29 y o  female MRN: [id number]  Unit/Bed#: LD Triage  Encounter: [id number]    Assessment/Plan     Assessment:  29year old  at 37 weeks and 6 days admitted with PROM  GBS negative  Thyroid nodules  Plan:  1  Admit for PROM  -induction of labor with pitocin  -routine labs  -clear liquid diet  2  Intrapartum pain management  -epidural on request    History of Present Illness   Chief Complaint: PROM    HPI:  Magdalena Pelaez is a 29 y o   female with an JASON of 2018, by Ultrasound at 38w6d weeks gestation who is being admitted for PROM  Her current obstetrical history is uncomplicated  Contractions: irregular and not painful  Leakage of fluid: onset: 18 at 2300 and color: pinkish and bloody  Bleeding: onset: with PROM  Fetal movement: present  Pregnancy complications: none  Review of Systems   Constitutional: Negative for chills and fever  Eyes: Negative for visual disturbance  Respiratory: Negative for chest tightness and shortness of breath  Cardiovascular: Negative for chest pain  Gastrointestinal: Negative for abdominal pain, nausea and vomiting  Genitourinary: Positive for vaginal discharge  Negative for vaginal bleeding  Leaking fluid   Neurological: Negative for dizziness and headaches         Historical Information   OB History    Para Term  AB Living   1             SAB TAB Ectopic Multiple Live Births                  # Outcome Date GA Lbr William/2nd Weight Sex Delivery Anes PTL Lv   1 Current                 Baby complications/comments: none  Past Medical History:   Diagnosis Date    Amenorrhea     Contraceptive use     RESOLVED: [id number]    Disease of thyroid gland     stabilized no meds nodules    History of early menarche     AGE 13    Irregular menstrual cycle     LAST ASSESSED: [id number]    Spotting in pregnancy, antepartum condition or complication     RESOLVED: [id number]    Thyroid disease     Varicella     POS HX     Past Surgical History:   Procedure Laterality Date    WISDOM TOOTH EXTRACTION       Social History   History   Alcohol Use    Yes     Comment: socially     History   Drug Use No     History   Smoking Status    Never Smoker   Smokeless Tobacco    Never Used     Family History: non-contributory    Meds/Allergies   all medications and allergies reviewed  No Known Allergies    Objective   Vitals: Blood pressure 123/75, pulse 83, temperature 98 9 °F (37 2 °C), temperature source Oral, resp  rate 18, height 5' 4" (1 626 m), weight 72 1 kg (159 lb), last menstrual period 07/20/2017, not currently breastfeeding  Body mass index is 27 29 kg/m²  Invasive Devices          No matching active lines, drains, or airways          Physical Exam   Constitutional: She is oriented to person, place, and time  She appears well-developed and well-nourished  No distress  HENT:   Head: Normocephalic and atraumatic  Neck: Normal range of motion  Cardiovascular: Normal rate  Pulmonary/Chest: Effort normal  Right breast exhibits no mass  Left breast exhibits no mass  Abdominal: Soft  There is no tenderness  Genitourinary: Vagina normal and uterus normal    Musculoskeletal: Normal range of motion  Neurological: She is alert and oriented to person, place, and time  Skin: Skin is warm and dry  She is not diaphoretic  Psychiatric: She has a normal mood and affect   Her behavior is normal      SVE: FT/70/-1 posterior, soft  VTX on US, anterior placenta    Prenatal Labs:   Blood Type:   Lab Results   Component Value Date/Time    ABO Grouping O 09/19/2017 03:30 PM    ABO Grouping O 09/19/2017 03:30 PM    ABO Grouping O 09/19/2017     , D (Rh type):   Lab Results   Component Value Date/Time    Rh Factor RH(D) POSITIVE 09/19/2017 03:30 PM    Rh Factor RH(D) POSITIVE 09/19/2017 03:30 PM     , Antibody Screen: negative,   HCT/HGB:   Lab Results   Component Value Date/Time    Hematocrit 36 1 01/27/2018 07:25 AM    Hematocrit 39 9 09/19/2017 03:30 PM    Hematocrit 39 9 09/19/2017 03:30 PM    Hemoglobin 12 0 01/27/2018 07:25 AM    Hemoglobin 13 5 09/19/2017 03:30 PM    Hemoglobin 13 5 09/19/2017 03:30 PM      , Platelets:   Lab Results   Component Value Date/Time    Platelets 565 72/42/1629 03:30 PM    Platelets 087 61/79/9383 03:30 PM    Platelet Count 148 52/46/4580 07:25 AM      , 1 hour Glucola:   Lab Results   Component Value Date/Time    Glucose 83 01/27/2018 07:25 AM   , Rubella: immune      , VDRL/RPR:   Lab Results   Component Value Date/Time    RPR SCREEN NON-REACTIVE 09/19/2017 03:30 PM    RPR SCREEN NON-REACTIVE 09/19/2017 03:30 PM      , Hep B:   Lab Results   Component Value Date/Time    HEPATITIS B SURFACE ANTIGEN NON-REACTIVE 09/19/2017 03:30 PM    HEPATITIS B SURFACE ANTIGEN NON-REACTIVE 09/19/2017 03:30 PM    External Hepatitis B Surface Ag NEG 09/19/2017     , HIV:   Lab Results   Component Value Date/Time    External HIV-1 Antibody NEG 09/19/2017        Group B Strep:  negative     Imaging, EKG, Pathology, and Other Studies: I have personally reviewed pertinent reports

## 2018-04-12 NOTE — ANESTHESIA PREPROCEDURE EVALUATION
Review of Systems/Medical History    Chart reviewed  No history of anesthetic complications     Cardiovascular  Negative cardio ROS    Pulmonary  Negative pulmonary ROS        GI/Hepatic    GERD (with pregnancy) ,        Negative  ROS        Endo/Other  History of thyroid disease ,      GYN  Currently pregnant ,          Hematology  Negative hematology ROS      Musculoskeletal  Negative musculoskeletal ROS        Neurology  Negative neurology ROS      Psychology   Negative psychology ROS              Physical Exam    Airway    Mallampati score: II  TM Distance: >3 FB  Neck ROM: full     Dental   No notable dental hx     Cardiovascular  Comment: Negative ROS,     Pulmonary      Other Findings        Anesthesia Plan  ASA Score- 2     Anesthesia Type- epidural with ASA Monitors  Additional Monitors:   Airway Plan:         Plan Factors-    Induction-     Postoperative Plan-     Informed Consent- Anesthetic plan and risks discussed with patient

## 2018-04-12 NOTE — OB LABOR/OXYTOCIN SAFETY PROGRESS
Oxytocin Safety Progress Check Note - Read Plants 29 y o  female MRN: [id number]    Unit/Bed#: -01 Encounter: [id number]    Obstetric History       T0      L0     SAB0   TAB0   Ectopic0   Multiple0   Live Births0      Gestational Age: 38w7d  Dose (joe-units/min) Oxytocin: 8 joe-units/min  Contraction Frequency (minutes): 2-3  Contraction Quality: Moderate      Dilation: 4        Effacement (%): 80  Station: -2  Baseline Rate: 135 bpm  Fetal Heart Rate: 145 BPM   Fetal heart rate category 1       Notes/comments:   Patient getting comfortable with epidural  Expectant management            Kinjal Bejarano MD 2018 8:24 AM

## 2018-04-12 NOTE — OB LABOR/OXYTOCIN SAFETY PROGRESS
Oxytocin Safety Progress Check Note - Benita Huerta 29 y o  female MRN: [id number]    Unit/Bed#: -01 Encounter: [id number]    Obstetric History       T0      L0     SAB0   TAB0   Ectopic0   Multiple0   Live Births0      Gestational Age: 38w7d  Dose (joe-units/min) Oxytocin: 8 joe-units/min  Contraction Frequency (minutes): 2-3  Contraction Quality: Moderate      Dilation: 3        Effacement (%): 90  Station: -1  Baseline Rate: 140 bpm  Fetal Heart Rate: 145 BPM          Notes/comments:     Patient getting uncomfortable, cervix is now 3cm  Will continue pitocin titration            Zeus Pedroza MD 2018 6:15 AM

## 2018-04-12 NOTE — ANESTHESIA PROCEDURE NOTES
Epidural Block    Patient location during procedure: OB  Start time: 4/12/2018 7:51 AM  Reason for block: procedure for pain, at surgeon's request and primary anesthetic  Staffing  Anesthesiologist: MJ Jama  Performed: anesthesiologist   Preanesthetic Checklist  Completed: patient identified, site marked, surgical consent, pre-op evaluation, timeout performed, IV checked, risks and benefits discussed and monitors and equipment checked  Epidural  Patient position: sitting  Prep: Betadine  Patient monitoring: heart rate, cardiac monitor, continuous pulse ox and frequent blood pressure checks  Approach: midline  Location: lumbar (1-5)  Injection technique: LEXUS air  Needle  Needle type: Tuohy   Needle gauge: 18 G  Test dose: negative and lidocaine 1 5% with epinephrine 1-to-200,000  Assessment  Sensory level: [id number] aspiration for CSF, negative aspiration for heme and no paresthesia on injection  patient tolerated the procedure well with no immediate complications  Additional Notes  One attempt, L3-4, LEXUS at 6 cm, taped to skin at 12 cm

## 2018-04-12 NOTE — OB LABOR/OXYTOCIN SAFETY PROGRESS
Oxytocin Safety Progress Check Note - Coit Curio 29 y o  female MRN: [id number]    Unit/Bed#: -01 Encounter: [id number]    Obstetric History       T0      L0     SAB0   TAB0   Ectopic0   Multiple0   Live Births0      Gestational Age: 38w7d  Dose (joe-units/min) Oxytocin: 10 joe-units/min  Contraction Frequency (minutes): 2-3  Contraction Quality: Moderate  Tachysystole: No   Dilation: 4        Effacement (%): 80  Station: -1  Baseline Rate: 135 bpm  Fetal Heart Rate: 143 BPM  FHR Category: Category II          Notes/comments:   Pt comfortable with epidural  SVE as above  Trudy every 1 5-2mins with pitocin at 10milliunits   Will continue current management          Maryse Dominguez MD 2018 11:03 AM

## 2018-04-12 NOTE — L&D DELIVERY NOTE
DELIVERY NOTE  Abraham Chavez 29 y o  female MRN: [id number]  Unit/Bed#: -01 Encounter: [id number]    Obstetrician:   Dr Hilaria Severance    Assistant: Dr Kristin Reinoso    Pre-Delivery Diagnosis: Term pregnancy  Induced labor  Single fetus  PROM    Post-Delivery Diagnosis: Same as above - Delivered  1st degree laceration    Procedure: Spontaneous vaginal delivery  Repair 1st degree spontaneous laceration      Indications for instrumental delivery: None    Estimated Blood Loss:  300mL     Cord Blood Gases:   Arterial pH:7 314, BE: -1 1  Venous pH: 7 381, BE: -1 3    Complications:  None    Description of Delivery:   Pt Is a 32yo  at 38w6d admitted for PROM at 2300 on 18  She was started on pitocin at 0244  She received an epidural for anesthesia  She progressed to complete at 1340 and pushing at 1354  After pushing for 6 minutes, the patient delivered a viable Female  over a 1st degree laceration at 1400, APGARs 9/9, weight 6lbs 0 8oz  A nuchal cord was not noted  With the assistance of maternal expulsive efforts and downward traction of fetal head, the anterior shoulder was delivered without difficulty, followed by the remainder of the infant's body  The  was placed on the mother's chest and provided routine care by the  staff  There was delayed clamping of the umbilical cord, after which, was doubly clamped and cut  Umbilical cord blood and umbilical artery and venous gases were collected  Placenta was delivered with fundal massage and gentle traction on the cord with active management of the third stage of labor  Placenta delivered intact with a 3-vessel cord at 1404  Active management of the third stage of labor was undertaken with IV pitocin  Bimanual exam was performed and uterus noted to be contracted down well  Bleeding was noted to be under control      Inspection of the perineum, vagina, labia, and urethra revealed a 1st degree which was then repaired in standard fashion with 3-0 Vicryl rapid  The lacerations showed good tissue reapproximation and hemostasis  Mother and baby are currently recovering nicely in stable condition  Dr Gaylene Litten was present and participated in the entire delivery      Jarrett Holland MD  OBGYN, PGY-1  4/12/2018 3:24 PM

## 2018-04-13 VITALS
HEART RATE: 70 BPM | DIASTOLIC BLOOD PRESSURE: 65 MMHG | HEIGHT: 64 IN | RESPIRATION RATE: 18 BRPM | SYSTOLIC BLOOD PRESSURE: 106 MMHG | OXYGEN SATURATION: 98 % | BODY MASS INDEX: 27.14 KG/M2 | TEMPERATURE: 98 F | WEIGHT: 159 LBS

## 2018-04-13 PROCEDURE — 99024 POSTOP FOLLOW-UP VISIT: CPT | Performed by: OBSTETRICS & GYNECOLOGY

## 2018-04-13 RX ORDER — IBUPROFEN 600 MG/1
600 TABLET ORAL EVERY 6 HOURS PRN
Qty: 30 TABLET | Refills: 0
Start: 2018-04-13 | End: 2018-09-25

## 2018-04-13 RX ORDER — DOCUSATE SODIUM 100 MG/1
100 CAPSULE, LIQUID FILLED ORAL 2 TIMES DAILY PRN
Qty: 10 CAPSULE | Refills: 0
Start: 2018-04-13 | End: 2018-09-25

## 2018-04-13 RX ORDER — DIAPER,BRIEF,INFANT-TODD,DISP
1 EACH MISCELLANEOUS AS NEEDED
Qty: 30 G | Refills: 0
Start: 2018-04-13 | End: 2018-09-25

## 2018-04-13 RX ADMIN — DOCUSATE SODIUM 100 MG: 100 CAPSULE, LIQUID FILLED ORAL at 08:13

## 2018-04-13 RX ADMIN — IBUPROFEN 600 MG: 600 TABLET ORAL at 08:27

## 2018-04-13 NOTE — SOCIAL WORK
Breast pump consult  Per pt request, Medela pump ordered from [facility] via [facility] for room delivery by 4pm today  No other CM needs noted

## 2018-04-13 NOTE — PROGRESS NOTES
Postpartum Progress Note     POSTPARTUM DAY: 1    PROCEDURE: Spontaneous Vaginal Delivery    SUBJECTIVE:    Pain: Well-controlled with motrin    Tolerating Oral Intake: yes     Voiding: yes    Flatus: yes    Bowel Movement: no    Ambulating: yes    Breastfeeding: yes, feeding well so far    Chest Pain: no    Shortness of Breath: no    Leg Pain/Discomfort: no    Lochia: moderate      OBJECTIVE:    General: No Acute Distress     Cardiovascular: Regular, Rate and Rhythm, no murmur, gallop or rub     Lungs: Clear to Auscultation Bilaterally, no wheezing, rhonchi or rales     Abdomen: Soft, non-distended, non-tender, no rebound, guarding or CVA tenderness     Fundus: Firm & Non-Tender     Fundal Location:   At the umbilicus     Lower Extremities: Non-Tender, no edema    Vitals:    04/12/18 2358   BP: 108/56   Pulse: 74   Resp: 18   Temp: 98 3 °F (36 8 °C)   SpO2:          Results from last 7 days  Lab Units 04/12/18  0131   WBC Thousand/uL 12 68*   HEMOGLOBIN g/dL 11 3*   HEMATOCRIT % 34 5*   PLATELETS Thousands/uL 200   NEUTROS PCT % 74   MONOS PCT % 8     LABS / TESTS / MEDICATION:      Admission on 04/11/2018   Component Date Value    ABO Grouping 04/12/2018 O     Rh Factor 04/12/2018 Positive     Antibody Screen 04/12/2018 Negative     Specimen Expiration Date 04/12/2018 [id number]     WBC 04/12/2018 12 68*    RBC 04/12/2018 4 29     Hemoglobin 04/12/2018 11 3*    Hematocrit 04/12/2018 34 5*    MCV 04/12/2018 80*    MCH 04/12/2018 26 3*    MCHC 04/12/2018 32 8     RDW 04/12/2018 13 0     MPV 04/12/2018 12 0     Platelets 70/46/5382 200     nRBC 04/12/2018 0     Neutrophils Relative 04/12/2018 74     Lymphocytes Relative 04/12/2018 17     Monocytes Relative 04/12/2018 8     Eosinophils Relative 04/12/2018 1     Basophils Relative 04/12/2018 0     Neutrophils Absolute 04/12/2018 9 28*    Lymphocytes Absolute 04/12/2018 2 16     Monocytes Absolute 04/12/2018 0 99     Eosinophils Absolute 04/12/2018 0 18     Basophils Absolute 2018 0 02     RPR 2018 Non-Reactive     N gonorrhoeae, DNA Probe 2018 N  gonorrhoeae Amplified DNA Negative     Chlamydia, DNA Probe 2018 C  trachomatis Amplified DNA Negative     pH, Cord Art 2018 7  314     pCO2, Cord Art 2018 52 2     pO2, Cord Art 2018 15 6     HCO3, Cord Art 2018 25 9     Base Exc, Cord Art 2018 -1 1*    O2 Content, Cord Art 2018 5 0     O2 Hgb, Arterial Cord 2018 24 3     pH, Cord Bernard 2018 7  381     pCO2, Cord Bernard 2018 40 8     pO2, Cord Bernard 2018 28 2     HCO3, Cord Bernard 2018 23 6    [facility], Cord Bernard 2018 -1 3*    O2 Cont, Cord Bernard 2018 13 3     O2 HGB,VENOUS CORD 2018 62 6     External Strep Group B Ag 2018 Negative        Current Facility-Administered Medications   Medication Dose Route Frequency    acetaminophen (TYLENOL) tablet 650 mg  650 mg Oral Q6H PRN    benzocaine-menthol-lanolin-aloe (DERMOPLAST) 20-0 5 % topical spray   Topical 4x Daily PRN    calcium carbonate (TUMS) chewable tablet 1,000 mg  1,000 mg Oral Daily PRN    diphenhydrAMINE (BENADRYL) tablet 25 mg  25 mg Oral Q6H PRN    docusate sodium (COLACE) capsule 100 mg  100 mg Oral BID    hydrocortisone 1 % cream 1 application  1 application Topical PRN    ibuprofen (MOTRIN) tablet 600 mg  600 mg Oral Q6H PRN    ondansetron (ZOFRAN) injection 4 mg  4 mg Intravenous Q8H PRN    oxyCODONE-acetaminophen (PERCOCET) 5-325 mg per tablet 1 tablet  1 tablet Oral Q4H PRN    oxyCODONE-acetaminophen (PERCOCET) 5-325 mg per tablet 2 tablet  2 tablet Oral Q4H PRN    oxytocin (PITOCIN) 30 Units in lactated ringers 500 mL infusion  250 joe-units/min Intravenous Continuous    witch hazel-glycerin (TUCKS) topical pad 1 pad  1 pad Topical PRN       ASSESSMENT AND PLAN:   Postpartum day # 1   s/p  s/p PROM  1  Continue Routine Postpartum Care  2  Encourage Ambulation  3  Advance diet as tolerated  4  Recommend outpatient follow up for thyroid nodules  5   Anticipate discharge home tomorrow    Signature/Title: Jaida Judge MD  Date: 4/13/2018  Time: 6:34 AM

## 2018-04-13 NOTE — LACTATION NOTE
This note was copied from a baby's chart  Mom request early DC  Infant bloodwork pending  Asks appropriate questions regarding BF and we discussed what to expect in the first 48 hours  Reports minimal discomfort when BF, but describes this as "gentle tugging"  Tips for proper latch and positioning reviewed  Enc to call for feeding eval PTD if desired  Discussed feeding log since birth for the first week  Emphasized 8 or more (12) feedings in a 24 hour period, what to expect for the number of diapers per day of life and the progression of properties of the  stooling pattern  Discussed s/s that breastfeeding is going well after day 4 and when to get help from a pediatrician or lactation support person after day 4  Has pediatrician appointment for Monday - infant DOL 4  Booklet included Breast Pumping Instructions, When You Go Back to Work or School, and Breastfeeding Resources for after discharge including access to the number for the [employer]  Discussed s/s engorgement and how to manage with medications and cool compresses as well as s/s mastitis and when to contact physician

## 2018-04-13 NOTE — PLAN OF CARE
POSTPARTUM     Experiences normal postpartum course Adequate for Discharge          POSTPARTUM     Experiences normal postpartum course Adequate for Discharge

## 2018-04-13 NOTE — PLAN OF CARE
Labor & Delivery     Manages discomfort Completed     Patient vital signs are stable Completed          DISCHARGE PLANNING     Discharge to home or other facility with appropriate resources Progressing        INFECTION - ADULT     Absence or prevention of progression during hospitalization Progressing     Absence of fever/infection during neutropenic period Progressing        Knowledge Deficit     Verbalizes understanding of labor plan Progressing     Patient/family/caregiver demonstrates understanding of disease process, treatment plan, medications, and discharge instructions Progressing        PAIN - ADULT     Verbalizes/displays adequate comfort level or baseline comfort level Progressing        POSTPARTUM     Experiences normal postpartum course Progressing     Appropriate maternal -  bonding Progressing     Establishment of infant feeding pattern Progressing     Incision(s), wounds(s) or drain site(s) healing without S/S of infection Progressing        SAFETY ADULT     Patient will remain free of falls Progressing     Maintain or return to baseline ADL function Progressing     Maintain or return mobility status to optimal level Progressing

## 2018-04-16 NOTE — CASE MANAGEMENT
Notification of Maternity Inpatient Admission/Maternity Inpatient Authorization Request  This is a Notification of Maternity Inpatient Admission/Maternity Inpatient Authorization Request to our facility [facility]  Please be advised that this patient is currently in our facility under Inpatient Status  Below you will find the Birth/ Summary, Attending Physician and Facilitys information including NPI# and contact for the Utilization  assigned to the [facility] where the patient is receiving services  Please feel free to contact the Utilization Review Department with any questions  Mothers Information:  Suzy Burton  MRN: [id number]  YOB: 1985  Admission Date: 2018 11:58 PM  Discharge Date: 2018  5:37 PM  Disposition: Home/Self Care  Admitting Diagnosis:   O80 VAGINAL DELIVERY  Premature rupture of membranes, unspecified as to length of time between rupture and onset of labor, unspecified weeks of gestation [O42 90]  38 weeks gestation of pregnancy [Z3A 38]  [place] Information:  Estimated Date of Delivery: 18  Information for the patient's :  Bertram Tovar [[id number]]      Delivery Information:  Sex: female  Delivered 2018 2:00 PM by Vaginal, Spontaneous Delivery; Gestational Age: 38w7d    [place] Measurements:  Weight: 6 lb 0 8 oz (2745 g); Height: 18 5"    APGAR 1 minute 5 minutes 10 minutes   Totals: 9 9      OB History      Para Term  AB Living    1 1 1     1    SAB TAB Ectopic Multiple Live Births          0 1        Attending Physician:  NELIDA Brian    Specialty- Obstetrics and Gynecology  [facility] ID- [id number]  [address] [place] [address], [address] [place] [address]  Phone 1: [phone]  Fax: [phone] [facility])  [address] [place] [address]  [phone]  Tax ID: [id number]  NPI: [id number]      [facility] in the [facility] by Hernesto Loredo for 2017  Network Utilization Review Department  Phone: [phone]; Fax [phone]  ATTENTION: The Network Utilization Review Department is now centralized for our 7 Facilities  Make a note that we have a new phone and fax numbers for our Department  Please call with any questions or concerns to [phone] and carefully follow the prompts so that you are directed to the right person  All voicemails are confidential  Fax any determinations, approvals, denials, and requests for initial or continue stay review clinical to [phone]  Due to HIGH CALL volume, it would be easier if you could please send faxed requests to expedite your requests and in part, help us provide discharge notifications faster

## 2018-04-23 LAB — PLACENTA IN STORAGE: NORMAL

## 2018-05-22 ENCOUNTER — TRANSCRIBE ORDERS (OUTPATIENT)
Dept: ADMINISTRATIVE | Facility: HOSPITAL | Age: 33
End: 2018-05-22

## 2018-05-22 DIAGNOSIS — E04.2 MULTIPLE THYROID NODULES: Primary | ICD-10-CM

## 2018-06-01 ENCOUNTER — HOSPITAL ENCOUNTER (OUTPATIENT)
Dept: ULTRASOUND IMAGING | Facility: HOSPITAL | Age: 33
Discharge: HOME | End: 2018-06-01
Attending: INTERNAL MEDICINE
Payer: COMMERCIAL

## 2018-06-01 DIAGNOSIS — E04.2 NONTOXIC MULTINODULAR GOITER: ICD-10-CM

## 2018-06-01 DIAGNOSIS — E04.2 MULTIPLE THYROID NODULES: ICD-10-CM

## 2018-06-01 PROCEDURE — 76536 US EXAM OF HEAD AND NECK: CPT

## 2018-06-04 ENCOUNTER — POSTPARTUM VISIT (OUTPATIENT)
Dept: OBGYN CLINIC | Facility: CLINIC | Age: 33
End: 2018-06-04

## 2018-06-04 VITALS
BODY MASS INDEX: 24.07 KG/M2 | HEIGHT: 64 IN | DIASTOLIC BLOOD PRESSURE: 72 MMHG | WEIGHT: 141 LBS | SYSTOLIC BLOOD PRESSURE: 116 MMHG

## 2018-06-04 PROCEDURE — 99024 POSTOP FOLLOW-UP VISIT: CPT | Performed by: OBSTETRICS & GYNECOLOGY

## 2018-06-05 ENCOUNTER — TELEPHONE (OUTPATIENT)
Dept: ENDOCRINOLOGY | Facility: CLINIC | Age: 33
End: 2018-06-05

## 2018-06-05 NOTE — TELEPHONE ENCOUNTER
Please call the patient regarding her abnormal result   Stable thyroid nodules , will monitor         Left this message for pt and to call if any further questions

## 2018-06-06 NOTE — PROGRESS NOTES
Postpartum Visit: Patient here for postpartum visit  She is 3 weeks post partum following a spontaneous vaginal delivery  I have fully reviewed the prenatal and intrapartum course  The delivery was at 45 gestational weeks  Outcome:   Anesthesia: epidural   Postpartum course has been uncomplicated  Baby's course has been doing well without problems  Baby is feeding by bottle because milk supply was unable to continue for baby  Patient is tolerating regular diet, Bleeding no bleeding  Bowel function is normal  Bladder function is normal  Patient is not sexually active  Contraception method is condoms  Postpartum depression screening: negative EPDS 4  Patient's previous cycles were irregular  Patient will watch to see how cycles return and we will discuss what may be best for cycles and future pregnancy  Patient is not sure of planned timing at this moment for next conception  Physical:   Vitals:    18 1006   BP: 116/72       Objective     /72 (BP Location: Left arm, Patient Position: Sitting, Cuff Size: Adult)   Ht 5' 4" (1 626 m)   Wt 64 kg (141 lb)   LMP 2017 (Exact Date)   Breastfeeding? No   BMI 24 20 kg/m²   General appearance: alert and oriented, in no acute distress  Lungs: clear to auscultation bilaterally  Heart: regular rate and rhythm, S1, S2 normal, no murmur, click, rub or gallop  Abdomen: soft, non-tender; bowel sounds normal; no masses,  no organomegaly  Pelvic: external genitalia normal, vagina normal without discharge, uterus normal size, shape, and consistency, no cervical motion tenderness, no adnexal masses or tenderness, rectovaginal septum normal and urethral meatus        Assessment 29year-old  3 weeks post vaginal delivery steadily recovering  Plan:  Patient had irregular cycles previously and polycystic ovary  Patient will watch to see how cycles return  Patient will call if needs something to regulate cycles  Would likely plan low-dose OCP  Return for annual or sooner as needed

## 2018-06-20 ENCOUNTER — OFFICE VISIT (OUTPATIENT)
Dept: ENDOCRINOLOGY | Facility: CLINIC | Age: 33
End: 2018-06-20
Payer: COMMERCIAL

## 2018-06-20 VITALS
HEART RATE: 42 BPM | HEIGHT: 64 IN | DIASTOLIC BLOOD PRESSURE: 80 MMHG | WEIGHT: 143.1 LBS | SYSTOLIC BLOOD PRESSURE: 110 MMHG | BODY MASS INDEX: 24.43 KG/M2

## 2018-06-20 DIAGNOSIS — R79.89 LOW TSH LEVEL: ICD-10-CM

## 2018-06-20 DIAGNOSIS — E04.2 MULTIPLE THYROID NODULES: Primary | ICD-10-CM

## 2018-06-20 LAB
T4 FREE SERPL-MCNC: 1.07 NG/DL (ref 0.76–1.46)
TSH SERPL DL<=0.05 MIU/L-ACNC: 0.67 UIU/ML (ref 0.36–3.74)

## 2018-06-20 PROCEDURE — 36415 COLL VENOUS BLD VENIPUNCTURE: CPT | Performed by: INTERNAL MEDICINE

## 2018-06-20 PROCEDURE — 86376 MICROSOMAL ANTIBODY EACH: CPT | Performed by: INTERNAL MEDICINE

## 2018-06-20 PROCEDURE — 99214 OFFICE O/P EST MOD 30 MIN: CPT | Performed by: INTERNAL MEDICINE

## 2018-06-20 PROCEDURE — 86800 THYROGLOBULIN ANTIBODY: CPT | Performed by: INTERNAL MEDICINE

## 2018-06-20 PROCEDURE — 84443 ASSAY THYROID STIM HORMONE: CPT | Performed by: INTERNAL MEDICINE

## 2018-06-20 PROCEDURE — 84439 ASSAY OF FREE THYROXINE: CPT | Performed by: INTERNAL MEDICINE

## 2018-06-20 NOTE — PROGRESS NOTES
Sylvain Gordon 29 y o  female MRN: [id number]    Encounter: [id number]      Assessment/Plan   Problem List Items Addressed This Visit     Low TSH level     Repeat thyroid function test, also check TPO and thyroglobulin antibodies         Relevant Orders    T4, free Clinic Collect    TSH, 3rd generation Clinic Collect    Thyroid Antibodies Panel Clinic Collect    US thyroid    Anti-microsomal antibody    Anti-thyroglobulin antibody    Multiple thyroid nodules - Primary     Stable and do not meet criteria for fine-needle aspiration biopsy-continue to monitor and repeat a thyroid ultrasound in 1 year         Relevant Orders    T4, free Clinic Collect    TSH, 3rd generation Clinic Collect    Thyroid Antibodies Panel Clinic Collect    US thyroid    Anti-microsomal antibody    Anti-thyroglobulin antibody          CC:   Thyroid nodule and low TSH    History of Present Illness      HPI:  61-year-old woman with history of thyroid nodule and low TSH during her pregnancy here for follow-up  She is currently 2 months post partum-TSH in January was normal   Generally feels well  Had gained 30 lb during her pregnancy and has lost 20 lb so far  Denies any constipation, complains of some fatigue, denies any heart racing, heat or cold intolerance, tremors, hyper defecation    Denies any obstructive symptoms including difficulty swallowing, breathing or pressure sensations    No family history of thyroid cancer    No history of radiation to her neck      Review of Systems   Constitutional: Positive for fatigue  Negative for unexpected weight change  Eyes: Negative for visual disturbance  Respiratory: Negative for cough and shortness of breath  Cardiovascular: Negative for chest pain, palpitations and leg swelling  Gastrointestinal: Negative for constipation, diarrhea, nausea and vomiting  Endocrine: Negative for polydipsia and polyuria  Musculoskeletal: Negative for gait problem     Skin: Negative for color change, pallor, rash and wound  Psychiatric/Behavioral: Negative for sleep disturbance  All other systems reviewed and are negative        Historical Information   Past Medical History:   Diagnosis Date    Amenorrhea     Contraceptive use     RESOLVED: [id number]    Disease of thyroid gland     stabilized no meds nodules    History of early menarche     AGE 13    Irregular menstrual cycle     LAST ASSESSED: [id number]    Spotting in pregnancy, antepartum condition or complication     RESOLVED: [id number]    Thyroid disease     Varicella     POS HX     Past Surgical History:   Procedure Laterality Date    WISDOM TOOTH EXTRACTION       Social History   History   Alcohol Use    Yes     Comment: socially     History   Drug Use No     History   Smoking Status    Never Smoker   Smokeless Tobacco    Never Used     Family History:   Family History   Problem Relation Age of Onset   [facility] Breast cancer Mother         MALIGNANT NEOPLASM    Thyroid disease Mother    [facility] Miscarriages / Stillbirths Mother     Cancer Mother     Diabetes Maternal Grandmother     Asthma Maternal Grandmother     Diabetes Maternal Grandfather     Hearing loss Maternal Grandfather     Hypertension Paternal Grandmother     Heart disease Paternal Grandmother     Diabetes Maternal Uncle        Meds/Allergies   Current Outpatient Prescriptions   Medication Sig Dispense Refill    Calcium Carbonate-Vit D-Min (CALCIUM 1200) 2523-4172 MG-UNIT CHEW Chew      Prenatal Vit-Fe Fumarate-FA (PRENATAL VITAMIN) 27-0 8 MG TABS Take by mouth      benzocaine-menthol-lanolin-aloe (DERMOPLAST) 20-0 5 % topical spray Apply 1 application topically 4 (four) times a day as needed for mild pain  0    docusate sodium (COLACE) 100 mg capsule Take 1 capsule (100 mg total) by mouth 2 (two) times a day as needed for constipation 10 capsule 0    hydrocortisone 1 % cream Apply 1 application topically as needed for irritation 30 g 0    ibuprofen (MOTRIN) 600 mg tablet Take 1 tablet (600 mg total) by mouth every 6 (six) hours as needed (cramping) 30 tablet 0     No current facility-administered medications for this visit  No Known Allergies    Objective   Vitals: Blood pressure 110/80, pulse (!) 42, height 5' 4" (1 626 m), weight 64 9 kg (143 lb 1 6 oz), not currently breastfeeding  Physical Exam   Constitutional: She is oriented to person, place, and time  She appears well-developed and well-nourished  No distress  HENT:   Head: Normocephalic and atraumatic  Mouth/Throat: No oropharyngeal exudate  Eyes: Conjunctivae and EOM are normal  No scleral icterus  Neck: Normal range of motion  Neck supple  No thyromegaly present  Cardiovascular: Normal rate, regular rhythm and normal heart sounds  No murmur heard  Pulmonary/Chest: Effort normal and breath sounds normal  No respiratory distress  She has no wheezes  She has no rales  Abdominal: Soft  Bowel sounds are normal  She exhibits no distension  There is no rebound  Musculoskeletal: Normal range of motion  She exhibits no edema or deformity  Lymphadenopathy:     She has no cervical adenopathy  Neurological: She is alert and oriented to person, place, and time  Skin: Skin is warm and dry  No rash noted  No erythema  No pallor  Psychiatric: She has a normal mood and affect  Her behavior is normal  Thought content normal        The history was obtained from the review of the chart, patient  Lab Results:   Lab Results   Component Value Date/Time    TSH 3RD GENERATON 0 41 11/25/2017 11:37 AM    TSH 3RD GENERATON 0 01 (L) 09/19/2017 03:30 PM    TSH 3RD GENERATON 0 01 (L) 09/19/2017 03:30 PM    Free T4 1 2 11/25/2017 11:37 AM    Free T4 1 9 (H) 09/19/2017 03:30 PM    Free T4 1 9 (H) 09/19/2017 03:30 PM    Free t4 1 0 01/27/2018 07:25 AM     Imaging Studies:    Study Result     THYROID ULTRASOUND          IMPRESSION:     Improved heterogeneity with right-sided thyroid nodules, not significantly changed    No nodule meets current ACR criteria for requiring biopsy but followup ultrasound is recommended in 1 year  I have personally reviewed pertinent reports  Portions of the record may have been created with voice recognition software  Occasional wrong word or "sound a like" substitutions may have occurred due to the inherent limitations of voice recognition software  Read the chart carefully and recognize, using context, where substitutions have occurred

## 2018-06-20 NOTE — ASSESSMENT & PLAN NOTE
Stable and do not meet criteria for fine-needle aspiration biopsy-continue to monitor and repeat a thyroid ultrasound in 1 year

## 2018-06-21 LAB
THYROGLOB AB SERPL-ACNC: 11.3 IU/ML (ref 0–0.9)
THYROPEROXIDASE AB SERPL-ACNC: 343 IU/ML (ref 0–34)

## 2018-06-21 NOTE — PROGRESS NOTES
Please call the patient regarding her abnormal result   Tsh normal - repeat tsh and free t4 in 3 months

## 2018-09-25 ENCOUNTER — ANNUAL EXAM (OUTPATIENT)
Dept: OBGYN CLINIC | Facility: CLINIC | Age: 33
End: 2018-09-25
Payer: COMMERCIAL

## 2018-09-25 VITALS
BODY MASS INDEX: 23.22 KG/M2 | WEIGHT: 136 LBS | HEIGHT: 64 IN | DIASTOLIC BLOOD PRESSURE: 76 MMHG | SYSTOLIC BLOOD PRESSURE: 110 MMHG

## 2018-09-25 DIAGNOSIS — Z80.3 FAMILY HISTORY OF BREAST CANCER IN MOTHER: ICD-10-CM

## 2018-09-25 DIAGNOSIS — Z12.31 ENCOUNTER FOR SCREENING MAMMOGRAM FOR MALIGNANT NEOPLASM OF BREAST: ICD-10-CM

## 2018-09-25 DIAGNOSIS — Z01.419 ENCOUNTER FOR GYNECOLOGICAL EXAMINATION WITH PAPANICOLAOU SMEAR OF CERVIX: Primary | ICD-10-CM

## 2018-09-25 PROCEDURE — 99395 PREV VISIT EST AGE 18-39: CPT | Performed by: PHYSICIAN ASSISTANT

## 2018-09-25 NOTE — PROGRESS NOTES
Assessment/Plan   Problem List Items Addressed This Visit     Encounter for gynecological examination with Papanicolaou smear of cervix - Primary     Pap guidelines reviewed  Pap with reflex done today  Reviewed can be normal for menses to be regular after baby even with PCOS, continue to monitor  Patient would not like any birth control method at this time, concerned may make difficult to become pregnant again once she stops  Will plan to use condoms for now  Reviewed vaginal dryness can be very normal after baby, recommend Astroglide or other water based lubrication  Reviewed postpartum hair loss, tends to improve or slow down by 6-12 months PP  If occurring beyond that point or excessive return to office or see PCP  Recent TFTs normal    Reviewed family hx of breast cancer, no lumps found today  Continue to monitor axilla pain and see if correlates with cycle, call if find lump or pain continues  Recommend mammogram secondary to mother being in late 29's to early 42's at diagnosis  Aware needs to wait 6 months post breastfeeding/pumping to have done  Return to office for annual or as needed  Relevant Orders    GP PAP (RFLX HPV PLUS WHEN ASCUS)      Other Visit Diagnoses     Encounter for screening mammogram for malignant neoplasm of breast        Relevant Orders    Mammo screening bilateral w cad    Family history of breast cancer in mother        Relevant Orders    Mammo screening bilateral w cad          Subjective:     Patient ID: Emile Zapata is a 29 y o  y o  female  HPI  30 yo seen for annual exam  Patient is 5mo PP, stopped pumping in 6/2018  Reports menses have been regular which has been a surprise to her as she has never had regular cycles and has suspected PCOS  Using condoms for birth control, does not desire another method at this time  Patient reports occasional pain in right axilla, comes and goes  No breast lumps  Mother had breast cancer dx in late 29's or early 42's  Reports mother had genetic testing and was negative  Reports vaginal dryness causing discomfort with intercourse since baby  Reports hair loss seems to be improving but worse around 3-5 months PP  Last pap: 3/2/2016 NILM (-)HRHPV  The following portions of the patient's history were reviewed and updated as appropriate:   She  has a past medical history of Amenorrhea; Contraceptive use; Disease of thyroid gland; History of early menarche; Irregular menstrual cycle; Spotting in pregnancy, antepartum condition or complication; Thyroid disease; and Varicella  She   Patient Active Problem List    Diagnosis Date Noted    Encounter for gynecological examination with Papanicolaou smear of cervix 2018    38 weeks gestation of pregnancy 2018    Premature rupture of membranes 2018     (spontaneous vaginal delivery) 2018    Encounter for supervision of normal first pregnancy in third trimester 2018    Low TSH level 2017    Multiple thyroid nodules 2017    Abnormal thyroid screen (blood) 2017     She  has a past surgical history that includes Ocate tooth extraction  Her family history includes Breast cancer in her mother; Cancer in her mother; Diabetes in her maternal uncle; Hearing loss in her maternal grandfather; Heart disease in her paternal grandmother; Hypertension in her father and paternal grandmother; [place] / [place] in her mother; Thyroid disease in her maternal grandmother and mother  She  reports that she has never smoked  She has never used smokeless tobacco  She reports that she drinks alcohol  She reports that she does not use drugs  Current Outpatient Prescriptions   Medication Sig Dispense Refill    Prenatal Vit-Fe Fumarate-FA (PRENATAL VITAMIN) 27-0 8 MG TABS Take by mouth       No current facility-administered medications for this visit  She has No Known Allergies       Menstrual History:  OB History      Para Term  AB Living    1 1 1     1    SAB TAB Ectopic Multiple Live Births          0 1         Menarche age: 7  Patient's last menstrual period was 2018 (exact date)  Period Cycle (Days): 28  Period Duration (Days): 6  Period Pattern: Regular  Menstrual Flow: Moderate  Dysmenorrhea: None      Review of Systems   Constitutional: Negative for fatigue, fever and unexpected weight change  HENT: Negative for dental problem and sinus pressure  Eyes: Negative for visual disturbance  Respiratory: Negative for cough, shortness of breath and wheezing  Cardiovascular: Negative for chest pain  Gastrointestinal: Negative for abdominal pain, blood in stool, constipation, diarrhea, nausea and vomiting  Endocrine: Negative for polydipsia  Genitourinary: Negative for difficulty urinating, dyspareunia, dysuria, frequency, hematuria, pelvic pain and urgency  Musculoskeletal: Negative for arthralgias and back pain  Neurological: Negative for dizziness, seizures, light-headedness and headaches  Psychiatric/Behavioral: Negative for suicidal ideas  The patient is not nervous/anxious  Objective:  Vitals:    18 1615   BP: 110/76   BP Location: Left arm   Patient Position: Sitting   Cuff Size: Standard   Weight: 61 7 kg (136 lb)   Height: 5' 4" (1 626 m)      Physical Exam   Constitutional: She is oriented to person, place, and time  She appears well-developed and well-nourished  Genitourinary: Vagina normal and uterus normal  There is no rash, tenderness, lesion, injury or Bartholin's cyst on the right labia  There is no rash, tenderness, lesion, injury or Bartholin's cyst on the left labia  Vagina exhibits no lesion  No erythema, tenderness or bleeding in the vagina  No signs of injury around the vagina  No vaginal discharge found  Right adnexum does not display mass, does not display tenderness and does not display fullness   Left adnexum does not display mass, does not display tenderness and does not display fullness  Cervix does not exhibit motion tenderness, lesion or discharge  Uterus is not enlarged, tender, exhibiting a mass, irregular (is regular) or mobile  HENT:   Head: Normocephalic and atraumatic  Neck: No thyromegaly present  Cardiovascular: Normal rate, regular rhythm and normal heart sounds  Exam reveals no gallop and no friction rub  No murmur heard  Pulmonary/Chest: Effort normal and breath sounds normal  No respiratory distress  She has no wheezes  Right breast exhibits no inverted nipple, no mass, no nipple discharge, no skin change and no tenderness  Left breast exhibits no inverted nipple, no mass, no nipple discharge, no skin change and no tenderness  Breasts are symmetrical  There is no breast swelling  Abdominal: Soft  She exhibits no distension and no mass  There is no tenderness  There is no rebound and no guarding  No hernia  Lymphadenopathy:     She has no cervical adenopathy  Right: No inguinal adenopathy present  Left: No inguinal adenopathy present  Neurological: She is alert and oriented to person, place, and time  Skin: Skin is warm and dry  Psychiatric: She has a normal mood and affect   Her behavior is normal

## 2018-09-25 NOTE — ASSESSMENT & PLAN NOTE
Pap guidelines reviewed  Pap with reflex done today  Reviewed can be normal for menses to be regular after baby even with PCOS, continue to monitor  Patient would not like any birth control method at this time, concerned may make difficult to become pregnant again once she stops  Will plan to use condoms for now  Reviewed vaginal dryness can be very normal after baby, recommend Astroglide or other water based lubrication  Reviewed postpartum hair loss, tends to improve or slow down by 6-12 months PP  If occurring beyond that point or excessive return to office or see PCP  Recent TFTs normal    Reviewed family hx of breast cancer, no lumps found today  Continue to monitor axilla pain and see if correlates with cycle, call if find lump or pain continues  Recommend mammogram secondary to mother being in late 29's to early 42's at diagnosis  Aware needs to wait 6 months post breastfeeding/pumping to have done  Return to office for annual or as needed

## 2018-09-27 LAB — THIN PREP CVX: NORMAL

## 2019-02-14 ENCOUNTER — HOSPITAL ENCOUNTER (OUTPATIENT)
Dept: RADIOLOGY | Facility: MEDICAL CENTER | Age: 34
Discharge: HOME | End: 2019-02-14
Payer: COMMERCIAL

## 2019-02-14 VITALS — WEIGHT: 136 LBS | BODY MASS INDEX: 23.22 KG/M2 | HEIGHT: 64 IN

## 2019-02-14 DIAGNOSIS — Z80.3 FAMILY HISTORY OF BREAST CANCER IN MOTHER: ICD-10-CM

## 2019-02-14 DIAGNOSIS — Z12.31 ENCOUNTER FOR SCREENING MAMMOGRAM FOR MALIGNANT NEOPLASM OF BREAST: ICD-10-CM

## 2019-02-14 PROCEDURE — 77067 SCR MAMMO BI INCL CAD: CPT

## 2019-02-14 PROCEDURE — 77063 BREAST TOMOSYNTHESIS BI: CPT

## 2019-02-22 ENCOUNTER — TELEPHONE (OUTPATIENT)
Dept: OBGYN CLINIC | Facility: CLINIC | Age: 34
End: 2019-02-22

## 2019-02-22 NOTE — TELEPHONE ENCOUNTER
Reviewed patient mammo results  Also scheduled appt for irreg menses per pt request  Has happened previously   No menses for months

## 2019-03-13 NOTE — PROGRESS NOTES
Assessment/Plan:    No problem-specific Assessment & Plan notes found for this encounter  Diagnoses and all orders for this visit:    Irregular menses  -     TSH, 3rd generation; Future  -     T4, free; Future  -     CBC and differential; Future  -     Prolactin; Future  -     norethindrone-ethinyl estradiol (JUNEL FE 1/20) 1-20 MG-MCG per tablet; Take 1 tablet by mouth daily          Subjective:      Patient ID: Wojciech Arevalo is a 36 y o  female  Pt for discussion of irregular menses  S/p    Prior to that delivery she had PCOS and h/o irregular menses  After she delivered she breast fed x 3 mths  Periods then resumed and were relatively normal until about December, then no menses until the end of Feb  This is exactly what her menses used ot do previously, would only cycle q 2-3 mths  Pt and partner likely desire to try for another pregnancy the end of this summer so they want to be proactive about normalizing her cycles beforehand  I did r/w pt options for cycle regulation  At this point she desires to start with a low dose OCP until she is ready to start trying         The following portions of the patient's history were reviewed and updated as appropriate: current medications, past family history, past medical history, past social history, past surgical history and problem list     Review of Systems      Objective:      /64 (BP Location: Left arm, Patient Position: Sitting, Cuff Size: Adult)   Ht 5' 4" (1 626 m)   Wt 59 kg (130 lb)   LMP 2019 (Exact Date)   BMI 22 31 kg/m²     Past Medical History:   Diagnosis Date    Amenorrhea     Contraceptive use     RESOLVED: [id number]    Disease of thyroid gland     stabilized no meds nodules    History of early menarche     AGE 13    Irregular menstrual cycle     LAST ASSESSED: [id number]    Spotting in pregnancy, antepartum condition or complication     RESOLVED: [id number]    Thyroid disease     Varicella     POS HX Past Surgical History:   Procedure Laterality Date    WISDOM TOOTH EXTRACTION       OB History    Para Term  AB Living   1 1 1 0 0 1   SAB TAB Ectopic Multiple Live Births   0 0 0 0 1            Physical Exam   Constitutional: She is oriented to person, place, and time  She appears well-developed and well-nourished  Neck: Tracheal deviation: thyroid nodules noted  Thyromegaly present  Cardiovascular: Normal rate and regular rhythm  Pulmonary/Chest: Effort normal and breath sounds normal    Neurological: She is alert and oriented to person, place, and time  Skin: Skin is warm and dry  Psychiatric: She has a normal mood and affect  Her behavior is normal  Judgment and thought content normal          Patient Instructions   As pt had irregular menses we will see when her next period comes  If no cycle by the end of March will call for slip for BW to check for ovulation and if no ovulation will plan Provera withdrawal and start OCPs at that point  Will abdon plan to check TFTs as pt has a h o abnormal TFTs in the past  Other than PNV she is not currently taking any medications

## 2019-03-16 ENCOUNTER — OFFICE VISIT (OUTPATIENT)
Dept: OBGYN CLINIC | Facility: CLINIC | Age: 34
End: 2019-03-16
Payer: COMMERCIAL

## 2019-03-16 VITALS
BODY MASS INDEX: 22.2 KG/M2 | HEIGHT: 64 IN | SYSTOLIC BLOOD PRESSURE: 116 MMHG | DIASTOLIC BLOOD PRESSURE: 64 MMHG | WEIGHT: 130 LBS

## 2019-03-16 DIAGNOSIS — N92.6 IRREGULAR MENSES: Primary | ICD-10-CM

## 2019-03-16 PROCEDURE — 99214 OFFICE O/P EST MOD 30 MIN: CPT | Performed by: PHYSICIAN ASSISTANT

## 2019-03-16 RX ORDER — NORETHINDRONE ACETATE AND ETHINYL ESTRADIOL 1MG-20(21)
1 KIT ORAL DAILY
Qty: 28 TABLET | Refills: 11 | Status: SHIPPED | OUTPATIENT
Start: 2019-03-16 | End: 2019-10-01

## 2019-03-16 NOTE — PATIENT INSTRUCTIONS
As pt had irregular menses we will see when her next period comes  If no cycle by the end of March will call for slip for BW to check for ovulation and if no ovulation will plan Provera withdrawal and start OCPs at that point  Will abdon plan to check TFTs as pt has a h o abnormal TFTs in the past  Other than PNV she is not currently taking any medications

## 2019-03-23 LAB
BASOPHILS # BLD AUTO: 40 CELLS/UL (ref 0–200)
BASOPHILS NFR BLD AUTO: 0.6 %
EOSINOPHIL # BLD AUTO: 141 CELLS/UL (ref 15–500)
EOSINOPHIL NFR BLD AUTO: 2.1 %
ERYTHROCYTE [DISTWIDTH] IN BLOOD BY AUTOMATED COUNT: 12.4 % (ref 11–15)
HCT VFR BLD AUTO: 38.5 % (ref 35–45)
HGB BLD-MCNC: 12.8 G/DL (ref 11.7–15.5)
LYMPHOCYTES # BLD AUTO: 1722 CELLS/UL (ref 850–3900)
LYMPHOCYTES NFR BLD AUTO: 25.7 %
MCH RBC QN AUTO: 27.8 PG (ref 27–33)
MCHC RBC AUTO-ENTMCNC: 33.2 G/DL (ref 32–36)
MCV RBC AUTO: 83.5 FL (ref 80–100)
MONOCYTES # BLD AUTO: 382 CELLS/UL (ref 200–950)
MONOCYTES NFR BLD AUTO: 5.7 %
NEUTROPHILS # BLD AUTO: 4415 CELLS/UL (ref 1500–7800)
NEUTROPHILS NFR BLD AUTO: 65.9 %
PLATELET # BLD AUTO: 224 THOUSAND/UL (ref 140–400)
PMV BLD REES-ECKER: 12.1 FL (ref 7.5–12.5)
PROLACTIN SERPL-MCNC: 9.1 NG/ML
RBC # BLD AUTO: 4.61 MILLION/UL (ref 3.8–5.1)
T4 FREE SERPL-MCNC: 1 NG/DL (ref 0.8–1.8)
TSH SERPL-ACNC: 0.82 MIU/L
WBC # BLD AUTO: 6.7 THOUSAND/UL (ref 3.8–10.8)

## 2019-04-03 ENCOUNTER — TELEPHONE (OUTPATIENT)
Dept: OBGYN CLINIC | Facility: CLINIC | Age: 34
End: 2019-04-03

## 2019-04-03 DIAGNOSIS — N91.2 AMENORRHEA: Primary | ICD-10-CM

## 2019-04-15 ENCOUNTER — TELEPHONE (OUTPATIENT)
Dept: OBGYN CLINIC | Facility: CLINIC | Age: 34
End: 2019-04-15

## 2019-06-12 ENCOUNTER — HOSPITAL ENCOUNTER (OUTPATIENT)
Dept: ULTRASOUND IMAGING | Facility: HOSPITAL | Age: 34
Discharge: HOME | End: 2019-06-12
Attending: INTERNAL MEDICINE
Payer: COMMERCIAL

## 2019-06-12 DIAGNOSIS — R79.89 LOW TSH LEVEL: ICD-10-CM

## 2019-06-12 DIAGNOSIS — E04.2 MULTIPLE THYROID NODULES: ICD-10-CM

## 2019-06-12 PROCEDURE — 76536 US EXAM OF HEAD AND NECK: CPT

## 2019-06-20 ENCOUNTER — TELEPHONE (OUTPATIENT)
Dept: ENDOCRINOLOGY | Facility: CLINIC | Age: 34
End: 2019-06-20

## 2019-07-02 ENCOUNTER — OFFICE VISIT (OUTPATIENT)
Dept: ENDOCRINOLOGY | Facility: CLINIC | Age: 34
End: 2019-07-02
Payer: COMMERCIAL

## 2019-07-02 VITALS
SYSTOLIC BLOOD PRESSURE: 110 MMHG | WEIGHT: 134.8 LBS | BODY MASS INDEX: 23.01 KG/M2 | DIASTOLIC BLOOD PRESSURE: 64 MMHG | HEIGHT: 64 IN

## 2019-07-02 DIAGNOSIS — E04.2 MULTIPLE THYROID NODULES: Primary | ICD-10-CM

## 2019-07-02 DIAGNOSIS — R79.89 LOW TSH LEVEL: ICD-10-CM

## 2019-07-02 PROCEDURE — 99214 OFFICE O/P EST MOD 30 MIN: CPT | Performed by: NURSE PRACTITIONER

## 2019-07-02 NOTE — PROGRESS NOTES
Established Patient Progress Note       Chief Complaint   Patient presents with    Thyroid Problem        History of Present Illness: Freddy Carrasquillo is a 36 y o  female with a history of low TSH during pregnancy and thyroid nodules  Her most recent thyroid function test was normal  She reports occasional palpitations  Reported this to PCP at recent visit and PCP felt this was due to dehydration  She denies changes in weight, heat intolerance, tremors, or hyper defectation  For the thyroid nodules she denies neck pain, dysphonia, dysphagia, or dyspnea  She denies family history of thyroid cancer  She denies radiation exposure to head or neck         Patient Active Problem List   Diagnosis    Low TSH level    Multiple thyroid nodules    Abnormal thyroid screen (blood)    Encounter for supervision of normal first pregnancy in third trimester    38 weeks gestation of pregnancy    Premature rupture of membranes     (spontaneous vaginal delivery)    Encounter for gynecological examination with Papanicolaou smear of cervix      Past Medical History:   Diagnosis Date    Amenorrhea     Contraceptive use     RESOLVED: [id number]    Disease of thyroid gland     stabilized no meds nodules    History of early menarche     AGE 13    Irregular menstrual cycle     LAST ASSESSED: [id number]    Spotting in pregnancy, antepartum condition or complication     RESOLVED: [id number]    Thyroid disease     Varicella     POS HX      Past Surgical History:   Procedure Laterality Date    WISDOM TOOTH EXTRACTION        Family History   Problem Relation Age of Onset    Breast cancer Mother         dx in early 42's   Ahumada Thyroid disease Mother     Miscarriages / [place] Mother    Ahumada Cancer Mother     BRCA 1/2 Mother     Thyroid disease Maternal Grandmother     Hearing loss Maternal Grandfather     Hypertension Paternal Grandmother     Heart disease Paternal Grandmother     Diabetes Maternal Uncle     Hypertension Father      Social History     Tobacco Use    Smoking status: Never Smoker    Smokeless tobacco: Never Used   Substance Use Topics    Alcohol use: Yes     Comment: socially     No Known Allergies    Current Outpatient Medications:     Prenatal Vit-Fe Fumarate-FA (PRENATAL VITAMIN) 27-0 8 MG TABS, Take by mouth, Disp: , Rfl:     norethindrone-ethinyl estradiol (JUNEL FE 1/20) 1-20 MG-MCG per tablet, Take 1 tablet by mouth daily (Patient not taking: Reported on 7/2/2019), Disp: 28 tablet, Rfl: 11    Review of Systems   Constitutional: Negative for chills and fever  HENT: Negative for sore throat and trouble swallowing  Eyes: Negative for visual disturbance  Respiratory: Negative for shortness of breath  Cardiovascular: Positive for palpitations  Negative for chest pain  Gastrointestinal: Negative for abdominal pain, constipation and diarrhea  Endocrine: Negative for cold intolerance, heat intolerance, polydipsia, polyphagia and polyuria  Genitourinary: Negative for frequency  Musculoskeletal: Negative for arthralgias and myalgias  Skin: Negative for rash  Neurological: Negative for dizziness and tremors  Hematological: Negative for adenopathy  Psychiatric/Behavioral: Negative for sleep disturbance  All other systems reviewed and are negative  Physical Exam:  Body mass index is 23 14 kg/m²  /64   Ht 5' 4" (1 626 m)   Wt 61 1 kg (134 lb 12 8 oz)   BMI 23 14 kg/m²    Wt Readings from Last 3 Encounters:   07/02/19 61 1 kg (134 lb 12 8 oz)   03/16/19 59 kg (130 lb)   02/14/19 61 7 kg (136 lb)       Physical Exam   Constitutional: She is oriented to person, place, and time  She appears well-developed and well-nourished  No distress  HENT:   Head: Normocephalic and atraumatic  Eyes: Pupils are equal, round, and reactive to light  Conjunctivae are normal    Neck: Normal range of motion  Neck supple  No thyromegaly present     Cardiovascular: Normal rate, regular rhythm and normal heart sounds  Pulmonary/Chest: Effort normal and breath sounds normal  No respiratory distress  She has no wheezes  She has no rales  Abdominal: Soft  Bowel sounds are normal  She exhibits no distension  There is no tenderness  Musculoskeletal: Normal range of motion  She exhibits no edema  Neurological: She is alert and oriented to person, place, and time  Skin: Skin is warm and dry  Psychiatric: She has a normal mood and affect  Vitals reviewed  Labs:       Component      Latest Ref Rng & Units 6/20/2018 3/22/2019   Free T4      0 8 - 1 8 ng/dL 1 07 1 0   TSH 3RD GENERATON      0 358 - 3 740 uIU/mL 0 665    THYROID MICROSOMAL ANTIBODY      0 - 34 IU/mL 343 (H)    THYROGLOBULIN AB      0 0 - 0 9 IU/mL 11 3 (H)    TSH, POC      mIU/L  0 82     THYROID ULTRASOUND     INDICATION:    R79 89: Other specified abnormal findings of blood chemistry  E04 2: Nontoxic multinodular goiter      COMPARISON:  6/1/2018     TECHNIQUE:   Ultrasound of the thyroid was performed with a high frequency linear transducer in transverse and sagittal planes including volumetric imaging sweeps as well as traditional still imaging technique      FINDINGS:  Thyroid parenchyma is diffusely heterogeneous in echotexture      Right lobe:  5 4 x 1 4 x 1 5 cm  Left lobe:  5 8 x 1 3 x 1 6 cm  Isthmus:  0 4 cm      Nodule #1  Image 4  RIGHT upper pole nodule measuring 0 8 x 0 4 x 0 8 cm  Interval increase in size  Prior measurement 0 4 x 0 6 x 0 5 cm  COMPOSITION:  2 points, solid or almost completely solid   ECHOGENICITY:  1 point, hyperechoic or isoechoic  SHAPE:  0 points, wider-than-tall  MARGIN: 0 points, smooth  ECHOGENIC FOCI:  0 points, none or large comet-tail artifacts  TI-RADS Classification: TR 3 (3 points), Mildly suspicious  FNA if >2 5 cm  Follow if >1 5 cm      Nodule #2  Image 11  RIGHT lower pole nodule measuring 1 3 x 0 7 x 1 2 cm  Minimally increased in size    Prior measurement 1 2 x 0 6 x 1 cm  COMPOSITION:  2 points, solid or almost completely solid   ECHOGENICITY:  1 point, hyperechoic or isoechoic  Previously this nodule appeared hypoechoic  SHAPE:  0 points, wider-than-tall  MARGIN: 0 points, smooth  ECHOGENIC FOCI:  0 points, none or large comet-tail artifacts  TI-RADS Classification: TR 3 (3 points), Mildly suspicious  FNA if >2 5 cm  Follow if >1 5 cm      There are additional nodules of lesser size and/or TI-RADS score  At the time of follow-up for the above described dominant nodules, these will be reevaluated in detailed at that time if needed      IMPRESSION:  Several thyroid nodules  Minimal increase in size of right upper and lower pole nodules  No nodule meets current ACR criteria for requiring biopsy but followup ultrasound is recommended in 1 year  Impression & Plan:    Problem List Items Addressed This Visit        Endocrine    Multiple thyroid nodules - Primary     US showed slight increase in size of nodules but do not meet criteria for biopsy  Repeat US in 1 year  Relevant Orders    US thyroid       Other    Low TSH level     Repeat thyroid function test now and prior to next visit  She is planning pregnancy in near future and will notify office when she becomes pregnant  Relevant Orders    T4, free    TSH, 3rd generation    T4, free    TSH, 3rd generation          Orders Placed This Encounter   Procedures    US thyroid     Standing Status:   Future     Standing Expiration Date:   7/2/2023     Scheduling Instructions:      No prep required  Please bring your insurance cards, a form of photo ID and a list of your medications with you  Arrive 15 minutes prior to your appointment time in order to register  To schedule this appointment, please contact Central Scheduling at [phone]   T4, free    TSH, 3rd generation     This is a patient instruction: This test is non-fasting   Please drink two glasses of water morning of bloodwork   T4, free     Standing Status:   Future     Standing Expiration Date:   7/2/2020    TSH, 3rd generation     This is a patient instruction: This test is non-fasting  Please drink two glasses of water morning of bloodwork  Standing Status:   Future     Standing Expiration Date:   7/2/2020       Discussed with the patient and all questioned fully answered  She will call me if any problems arise        Follow-up appointment in 1 year     Counseled patient on diagnostic results, prognosis, risk and benefit of treatment options, instruction for management, importance of treatment compliance, Risk  factor reduction and impressions      Lasha Angeles [address]

## 2019-07-02 NOTE — ASSESSMENT & PLAN NOTE
US showed slight increase in size of nodules but do not meet criteria for biopsy  Repeat US in 1 year

## 2019-07-02 NOTE — ASSESSMENT & PLAN NOTE
Repeat thyroid function test now and prior to next visit  She is planning pregnancy in near future and will notify office when she becomes pregnant

## 2019-09-01 LAB
T4 FREE SERPL-MCNC: 1 NG/DL (ref 0.8–1.8)
TSH SERPL-ACNC: 1.48 MIU/L

## 2019-09-05 ENCOUNTER — TELEPHONE (OUTPATIENT)
Dept: ENDOCRINOLOGY | Facility: CLINIC | Age: 34
End: 2019-09-05

## 2019-10-01 ENCOUNTER — ANNUAL EXAM (OUTPATIENT)
Dept: OBGYN CLINIC | Facility: CLINIC | Age: 34
End: 2019-10-01
Payer: COMMERCIAL

## 2019-10-01 VITALS
BODY MASS INDEX: 22.23 KG/M2 | SYSTOLIC BLOOD PRESSURE: 98 MMHG | DIASTOLIC BLOOD PRESSURE: 66 MMHG | HEIGHT: 64 IN | WEIGHT: 130.2 LBS

## 2019-10-01 DIAGNOSIS — Z80.3 FAMILY HISTORY OF BREAST CANCER: ICD-10-CM

## 2019-10-01 DIAGNOSIS — Z01.419 ROUTINE GYNECOLOGICAL EXAMINATION: Primary | ICD-10-CM

## 2019-10-01 DIAGNOSIS — Z12.31 ENCOUNTER FOR SCREENING MAMMOGRAM FOR BREAST CANCER: ICD-10-CM

## 2019-10-01 PROBLEM — Z86.39 HISTORY OF HYPERTHYROIDISM: Status: ACTIVE | Noted: 2019-10-01

## 2019-10-01 PROBLEM — N92.6 IRREGULAR MENSES: Status: ACTIVE | Noted: 2019-10-01

## 2019-10-01 PROBLEM — O26.859 SPOTTING IN PREGNANCY, ANTEPARTUM CONDITION OR COMPLICATION: Status: ACTIVE | Noted: 2018-03-30

## 2019-10-01 PROBLEM — Z30.40 CONTRACEPTIVE USE: Status: ACTIVE | Noted: 2019-10-01

## 2019-10-01 PROCEDURE — 99395 PREV VISIT EST AGE 18-39: CPT | Performed by: PHYSICIAN ASSISTANT

## 2019-10-01 NOTE — ASSESSMENT & PLAN NOTE
Pap guidelines reviewed  Pap deferred secondary to negative pap and HPV in 2016 in this low risk patient  Continue prenatal vitamin  Continue to track cycles  Call office if cycles start to be irregular again  Call office with positive pregnancy test or as needed

## 2019-10-01 NOTE — PROGRESS NOTES
Assessment/Plan   Problem List Items Addressed This Visit        Other    Routine gynecological examination - Primary     Pap guidelines reviewed  Pap deferred secondary to negative pap and HPV in 2016 in this low risk patient  Continue prenatal vitamin  Continue to track cycles  Call office if cycles start to be irregular again  Call office with positive pregnancy test or as needed  Other Visit Diagnoses     Encounter for screening mammogram for breast cancer        Relevant Orders    Mammo screening bilateral w 3d & cad    Family history of breast cancer        Relevant Orders    Mammo screening bilateral w 3d & cad          Subjective:     Patient ID: Lynnette Spangler is a 36 y o  y o  female  HPI  35 yo seen for annual exam  Menses have been regular since the beginning of the year every 30-31 days  Patient starting to try to conceive this past month  Reports with last pregnancy menses were very irregular thought to be secondary to hyperthyroidism in the past  Had done Provera withdrawal previously in attempt to bring on normal cycles  Recent TFTs were normal    Last pap: 2018 NILM 3/2/2016 NILM (-)HRHPV  Last mammogram: 2019 BIRADS 2: Benign  (mother hx of breast cancer age 36)  The following portions of the patient's history were reviewed and updated as appropriate:   She  has a past medical history of Amenorrhea, Contraceptive use, Disease of thyroid gland, History of early menarche, Irregular menstrual cycle, Spotting in pregnancy, antepartum condition or complication, Thyroid disease, and Varicella    She   Patient Active Problem List    Diagnosis Date Noted    Contraceptive use 10/01/2019    History of hyperthyroidism 10/01/2019    Irregular menses 10/01/2019    Routine gynecological examination 10/01/2019    Visit for routine gyn exam 2018    38 weeks gestation of pregnancy 2018    Premature rupture of membranes 2018     (spontaneous vaginal delivery) 2018    Spotting in pregnancy, antepartum condition or complication 8625    Low TSH level 2017    Multiple thyroid nodules 2017    Abnormal thyroid screen (blood) 2017     She  has a past surgical history that includes [place] tooth extraction  Her family history includes Breast cancer (age of onset: 36) in her mother; Cancer in her mother; Diabetes in her maternal uncle; Hearing loss in her maternal grandfather; Heart disease in her paternal grandmother; Hypertension in her father and paternal grandmother; Vonzella David / [place] in her mother; Thyroid disease in her maternal grandmother and mother  She  reports that she has never smoked  She has never used smokeless tobacco  She reports that she drinks alcohol  She reports that she does not use drugs  Current Outpatient Medications   Medication Sig Dispense Refill    Prenatal Vit-Fe Fumarate-FA (PRENATAL VITAMIN) 27-0 8 MG TABS Take by mouth       No current facility-administered medications for this visit  She has No Known Allergies       Menstrual History:  OB History        1    Para   1    Term   1            AB        Living   1       SAB        TAB        Ectopic        Multiple   0    Live Births   1                Menarche age: 9  No LMP recorded  Period Cycle (Days): 30  Period Duration (Days): 5-7  Period Pattern: Regular  Menstrual Flow: Light  Dysmenorrhea: None      Review of Systems   Constitutional: Negative for fatigue, fever and unexpected weight change  HENT: Negative for dental problem and sinus pressure  Eyes: Negative for visual disturbance  Respiratory: Negative for cough, shortness of breath and wheezing  Cardiovascular: Negative for chest pain  Gastrointestinal: Negative for abdominal pain, blood in stool, constipation, diarrhea, nausea and vomiting  Endocrine: Negative for polydipsia     Genitourinary: Negative for difficulty urinating, dyspareunia, dysuria, frequency, hematuria, pelvic pain and urgency  Musculoskeletal: Negative for arthralgias and back pain  Neurological: Negative for dizziness, seizures, light-headedness and headaches  Psychiatric/Behavioral: Negative for suicidal ideas  The patient is not nervous/anxious  Objective:  Vitals:    10/01/19 1634   BP: 98/66   BP Location: Left arm   Patient Position: Sitting   Cuff Size: Standard   Weight: 59 1 kg (130 lb 3 2 oz)   Height: 5' 4" (1 626 m)      Physical Exam   Constitutional: She is oriented to person, place, and time  She appears well-developed and well-nourished  Genitourinary: Vagina normal and uterus normal  There is no rash, tenderness, lesion, injury or Bartholin's cyst on the right labia  There is no rash, tenderness, lesion, injury or Bartholin's cyst on the left labia  Vagina exhibits no lesion  No erythema, tenderness or bleeding in the vagina  No signs of injury around the vagina  No vaginal discharge found  Right adnexum does not display mass, does not display tenderness and does not display fullness  Left adnexum does not display mass, does not display tenderness and does not display fullness  Cervix does not exhibit motion tenderness, lesion or discharge  Uterus is not enlarged, tender, exhibiting a mass, irregular (is regular) or mobile  HENT:   Head: Normocephalic and atraumatic  Neck: No thyromegaly present  Cardiovascular: Normal rate, regular rhythm and normal heart sounds  Exam reveals no gallop and no friction rub  No murmur heard  Pulmonary/Chest: Effort normal and breath sounds normal  No respiratory distress  She has no wheezes  Right breast exhibits no inverted nipple, no mass, no nipple discharge, no skin change and no tenderness  Left breast exhibits no inverted nipple, no mass, no nipple discharge, no skin change and no tenderness  No breast swelling  Breasts are symmetrical    Abdominal: Soft  She exhibits no distension and no mass   There is no tenderness  There is no rebound and no guarding  No hernia  Lymphadenopathy:     She has no cervical adenopathy  Right: No inguinal adenopathy present  Left: No inguinal adenopathy present  Neurological: She is alert and oriented to person, place, and time  Skin: Skin is warm and dry  Psychiatric: She has a normal mood and affect   Her behavior is normal

## 2019-11-01 ENCOUNTER — TELEPHONE (OUTPATIENT)
Dept: OBGYN CLINIC | Facility: CLINIC | Age: 34
End: 2019-11-01

## 2019-11-01 NOTE — TELEPHONE ENCOUNTER
NOB apt 12/9 with pt schedule had to push apt past her 8 weeks  Pt is aware but the apt on 12/9 works best with schedule

## 2019-11-01 NOTE — TELEPHONE ENCOUNTER
Spoke to Shakr [facility] at [facility]  4-12-18  copay $10 at int  Visit  No ded  Oop $400   $80 applied  No co ins    #[id number]
Unknown if ever smoked

## 2019-11-13 ENCOUNTER — TELEPHONE (OUTPATIENT)
Dept: OBGYN CLINIC | Facility: CLINIC | Age: 34
End: 2019-11-13

## 2019-11-13 NOTE — TELEPHONE ENCOUNTER
Per Mery Galvan, Ref   #[id number], effective 4-12-18 and active; $10 copay; OOP $400 individual and $800 family; then 100%; no deductible; follows 48-96 hour rule; provider in network, facility to call ; 11-13-19 cu

## 2019-12-07 PROBLEM — O26.859 SPOTTING IN PREGNANCY, ANTEPARTUM CONDITION OR COMPLICATION: Status: RESOLVED | Noted: 2018-03-30 | Resolved: 2019-12-07

## 2019-12-07 PROBLEM — O42.90 PREMATURE RUPTURE OF MEMBRANES: Status: RESOLVED | Noted: 2018-04-12 | Resolved: 2019-12-07

## 2019-12-07 PROBLEM — Z3A.38 38 WEEKS GESTATION OF PREGNANCY: Status: RESOLVED | Noted: 2018-04-12 | Resolved: 2019-12-07

## 2019-12-07 PROBLEM — Z30.40 CONTRACEPTIVE USE: Status: RESOLVED | Noted: 2019-10-01 | Resolved: 2019-12-07

## 2019-12-09 ENCOUNTER — INITIAL PRENATAL (OUTPATIENT)
Dept: OBGYN CLINIC | Facility: CLINIC | Age: 34
End: 2019-12-09

## 2019-12-09 VITALS
SYSTOLIC BLOOD PRESSURE: 114 MMHG | DIASTOLIC BLOOD PRESSURE: 76 MMHG | BODY MASS INDEX: 22.5 KG/M2 | WEIGHT: 131.8 LBS | HEIGHT: 64 IN

## 2019-12-09 DIAGNOSIS — Z34.81 MULTIGRAVIDA IN FIRST TRIMESTER: Primary | ICD-10-CM

## 2019-12-09 PROCEDURE — PNV: Performed by: PHYSICIAN ASSISTANT

## 2019-12-09 RX ORDER — PHENOL 1.4 %
600 AEROSOL, SPRAY (ML) MUCOUS MEMBRANE 2 TIMES DAILY WITH MEALS
COMMUNITY

## 2019-12-09 NOTE — PROGRESS NOTES
Pt for NOB  Does have a lot of nausea, but able to keep food and fluids down  PE done 10/19 deferred today  Cx's and pelvic exam done  TAUS + FHR s=d keep JASON  Pt did not breast feed w her first, it did not work for them  No h/o MRSA, had Varicella as a child  No travel planned and no cats in the house  Pt signed consents  She declines any genetic testing  Will plan to establish at [facility] for 20 week anatomy scan  Slip written for initial OB panel, minus CF, plus TFTs

## 2019-12-12 LAB — EXTERNAL RH FACTOR: POSITIVE

## 2019-12-13 LAB
ABO GROUP BLD: ABNORMAL
APPEARANCE UR: CLEAR
BACTERIA UR QL AUTO: ABNORMAL /HPF
BASOPHILS # BLD AUTO: 43 CELLS/UL (ref 0–200)
BASOPHILS NFR BLD AUTO: 0.5 %
BILIRUB UR QL STRIP: NEGATIVE
BLD GP AB SCN SERPL QL: ABNORMAL
COLOR UR: YELLOW
EOSINOPHIL # BLD AUTO: 172 CELLS/UL (ref 15–500)
EOSINOPHIL NFR BLD AUTO: 2 %
ERYTHROCYTE [DISTWIDTH] IN BLOOD BY AUTOMATED COUNT: 13.6 % (ref 11–15)
EXTERNAL HEPATITIS B SURFACE ANTIGEN: NEGATIVE
EXTERNAL HIV-1/2 AB-AG: NORMAL
EXTERNAL RUBELLA IGG QUANTITATION: NORMAL
GLUCOSE UR QL STRIP: NEGATIVE
HBV SURFACE AG SERPL QL IA: ABNORMAL
HCT VFR BLD AUTO: 35.3 % (ref 35–45)
HCV AB S/CO SERPL IA: 0.02
HCV AB SERPL QL IA: NORMAL
HGB BLD-MCNC: 11.6 G/DL (ref 11.7–15.5)
HGB UR QL STRIP: NEGATIVE
HIV 1+2 AB+HIV1 P24 AG SERPL QL IA: NORMAL
HYALINE CASTS #/AREA URNS LPF: ABNORMAL /LPF
KETONES UR QL STRIP: NEGATIVE
LEUKOCYTE ESTERASE UR QL STRIP: NEGATIVE
LYMPHOCYTES # BLD AUTO: 2004 CELLS/UL (ref 850–3900)
LYMPHOCYTES NFR BLD AUTO: 23.3 %
MCH RBC QN AUTO: 28 PG (ref 27–33)
MCHC RBC AUTO-ENTMCNC: 32.9 G/DL (ref 32–36)
MCV RBC AUTO: 85.1 FL (ref 80–100)
MONOCYTES # BLD AUTO: 697 CELLS/UL (ref 200–950)
MONOCYTES NFR BLD AUTO: 8.1 %
NEUTROPHILS # BLD AUTO: 5685 CELLS/UL (ref 1500–7800)
NEUTROPHILS NFR BLD AUTO: 66.1 %
NITRITE UR QL STRIP: NEGATIVE
PH UR STRIP: 6 [PH] (ref 5–8)
PLATELET # BLD AUTO: 248 THOUSAND/UL (ref 140–400)
PMV BLD REES-ECKER: 12.3 FL (ref 7.5–12.5)
PROT UR QL STRIP: NEGATIVE
RBC # BLD AUTO: 4.15 MILLION/UL (ref 3.8–5.1)
RBC #/AREA URNS HPF: ABNORMAL /HPF
RH BLD: ABNORMAL
RPR SER QL: ABNORMAL
RUBV IGG SERPL IA-ACNC: 4.05 INDEX
SP GR UR STRIP: 1.01 (ref 1–1.03)
SQUAMOUS #/AREA URNS HPF: ABNORMAL /HPF
T4 FREE SERPL-MCNC: 1.1 NG/DL (ref 0.8–1.8)
TSH SERPL-ACNC: 0.3 MIU/L
WBC # BLD AUTO: 8.6 THOUSAND/UL (ref 3.8–10.8)
WBC #/AREA URNS HPF: ABNORMAL /HPF

## 2019-12-14 LAB
BACTERIA GENITAL AEROBE CULT: ABNORMAL
C TRACH RRNA SPEC QL NAA+PROBE: NEGATIVE
Lab: ABNORMAL
Lab: ABNORMAL
N GONORRHOEA RRNA SPEC QL NAA+PROBE: NEGATIVE
T VAGINALIS RRNA SPEC QL NAA+PROBE: NEGATIVE

## 2020-01-07 ENCOUNTER — TELEPHONE (OUTPATIENT)
Dept: OBGYN CLINIC | Facility: CLINIC | Age: 35
End: 2020-01-07

## 2020-01-07 NOTE — TELEPHONE ENCOUNTER
Patient had question about colleague having mono in the office  Reviewed how it is contracted and along as she did not have contact with the his/or bodily fluids like Johnice Levels a cup" patient will be vigilant for s/sx of a cold and will call if she notices anything

## 2020-01-13 ENCOUNTER — ROUTINE PRENATAL (OUTPATIENT)
Dept: OBGYN CLINIC | Facility: CLINIC | Age: 35
End: 2020-01-13

## 2020-01-13 VITALS
SYSTOLIC BLOOD PRESSURE: 108 MMHG | BODY MASS INDEX: 23.25 KG/M2 | WEIGHT: 136.2 LBS | DIASTOLIC BLOOD PRESSURE: 74 MMHG | HEIGHT: 64 IN

## 2020-01-13 DIAGNOSIS — Z3A.14 PREGNANCY WITH 14 COMPLETED WEEKS GESTATION: Primary | ICD-10-CM

## 2020-01-13 PROCEDURE — PNV: Performed by: OBSTETRICS & GYNECOLOGY

## 2020-01-13 NOTE — PROGRESS NOTES
Patient reports no fm, no n/v, headache, cramping, bleeding, loss of fluid, edema, dom violence, or smoking  christi pnv urine neg/neg has pnc appt, return in 4 weeks or sooner as needed, reviewed labs and gbs, repeat tsh free t4 in 4-6 weeks from last draw    Given slip

## 2020-01-29 ENCOUNTER — ROUTINE PRENATAL (OUTPATIENT)
Dept: OBGYN CLINIC | Facility: CLINIC | Age: 35
End: 2020-01-29
Payer: COMMERCIAL

## 2020-01-29 ENCOUNTER — TELEPHONE (OUTPATIENT)
Dept: OBGYN CLINIC | Facility: CLINIC | Age: 35
End: 2020-01-29

## 2020-01-29 VITALS — BODY MASS INDEX: 23.34 KG/M2 | WEIGHT: 136 LBS | DIASTOLIC BLOOD PRESSURE: 72 MMHG | SYSTOLIC BLOOD PRESSURE: 116 MMHG

## 2020-01-29 DIAGNOSIS — Z34.82 PRENATAL CARE, SUBSEQUENT PREGNANCY, SECOND TRIMESTER: ICD-10-CM

## 2020-01-29 DIAGNOSIS — N89.8 VAGINAL DISCHARGE: ICD-10-CM

## 2020-01-29 DIAGNOSIS — R30.0 DYSURIA: Primary | ICD-10-CM

## 2020-01-29 LAB
BV WHIFF TEST VAG QL: NORMAL
CLUE CELLS SPEC QL WET PREP: NORMAL
PH SMN: 5 [PH]
SL AMB POCT WET MOUNT: NORMAL
T VAGINALIS VAG QL WET PREP: NORMAL
YEAST VAG QL WET PREP: NORMAL

## 2020-01-29 PROCEDURE — 87210 SMEAR WET MOUNT SALINE/INK: CPT | Performed by: PHYSICIAN ASSISTANT

## 2020-01-29 PROCEDURE — PNV: Performed by: PHYSICIAN ASSISTANT

## 2020-01-29 NOTE — PROGRESS NOTES
OB Problem Visit: Reports last night had dark foul smelling urine and one episode of slight burning on urination, hydrated well and symptoms resolved  Does reports occasionally having wet spots in underwear  Denies odor, itching  Good Fm, occ headaches, tolerable, reviewed hydration, tylenol, rest  No FM, N/V, HA, cramping, VB, LOF, edema, domestic violence, or smoking  Tolerating PNV  Vaginal exam: scant white discharge, Nitrazine negative  Wet mount: no trichomonads, yeast, or BV  Urine dip:   Leukocytes: neg  Nitrates:neg   Uro:neg   Protein:neg   PH: 5 5  Blood: neg   Specific gravity: 1 015  Ketones: neg   Bili: neg  Glucose: neg   Will plan to send genital culture and urine for culture to be sure  With negative dip today will hold off on treatment  Continue to hydrate  Reviewed s/s to call office with  Continue routine prenatal care  Return to office for scheduled ob check

## 2020-01-31 LAB
BACTERIA UR CULT: NORMAL
Lab: NO GROWTH

## 2020-02-02 LAB
BACTERIA GENITAL AEROBE CULT: NORMAL
Lab: NORMAL

## 2020-02-06 LAB
T4 FREE SERPL-MCNC: 1.1 NG/DL (ref 0.8–1.8)
TSH SERPL-ACNC: 0.71 MIU/L

## 2020-02-13 ENCOUNTER — ROUTINE PRENATAL (OUTPATIENT)
Dept: OBGYN CLINIC | Facility: CLINIC | Age: 35
End: 2020-02-13

## 2020-02-13 VITALS
SYSTOLIC BLOOD PRESSURE: 114 MMHG | DIASTOLIC BLOOD PRESSURE: 68 MMHG | BODY MASS INDEX: 24.41 KG/M2 | WEIGHT: 143 LBS | HEIGHT: 64 IN

## 2020-02-13 DIAGNOSIS — Z34.82 PRENATAL CARE, SUBSEQUENT PREGNANCY, SECOND TRIMESTER: Primary | ICD-10-CM

## 2020-02-13 PROBLEM — Z34.81 MULTIGRAVIDA IN FIRST TRIMESTER: Status: RESOLVED | Noted: 2019-12-09 | Resolved: 2020-02-13

## 2020-02-13 PROBLEM — Z01.419 ROUTINE GYNECOLOGICAL EXAMINATION: Status: RESOLVED | Noted: 2019-10-01 | Resolved: 2020-02-13

## 2020-02-13 PROBLEM — Z01.419 VISIT FOR ROUTINE GYN EXAM: Status: RESOLVED | Noted: 2018-09-25 | Resolved: 2020-02-13

## 2020-02-13 PROBLEM — N92.6 IRREGULAR MENSES: Status: RESOLVED | Noted: 2019-10-01 | Resolved: 2020-02-13

## 2020-02-13 PROCEDURE — PNV: Performed by: OBSTETRICS & GYNECOLOGY

## 2020-02-19 NOTE — PROGRESS NOTES
The patient was seen today for an ultrasound  Please see ultrasound report (located under Ob Procedures) for additional details  Thank you very much for allowing us to participate in the care of this very nice patient  Should you have any questions, please do not hesitate to contact me  MD Rekha Lopezucah  Attending Physician, Anne

## 2020-02-20 ENCOUNTER — ROUTINE PRENATAL (OUTPATIENT)
Dept: PERINATAL CARE | Facility: CLINIC | Age: 35
End: 2020-02-20
Payer: COMMERCIAL

## 2020-02-20 VITALS
DIASTOLIC BLOOD PRESSURE: 77 MMHG | HEART RATE: 69 BPM | HEIGHT: 64 IN | BODY MASS INDEX: 24.17 KG/M2 | SYSTOLIC BLOOD PRESSURE: 126 MMHG | WEIGHT: 141.6 LBS

## 2020-02-20 DIAGNOSIS — Z3A.20 20 WEEKS GESTATION OF PREGNANCY: ICD-10-CM

## 2020-02-20 DIAGNOSIS — O44.42 LOW LYING PLACENTA NOS OR WITHOUT HEMORRHAGE, SECOND TRIMESTER: ICD-10-CM

## 2020-02-20 DIAGNOSIS — Z34.81 MULTIGRAVIDA IN FIRST TRIMESTER: ICD-10-CM

## 2020-02-20 DIAGNOSIS — Z36.3 ENCOUNTER FOR ANTENATAL SCREENING FOR MALFORMATION USING ULTRASOUND: Primary | ICD-10-CM

## 2020-02-20 PROCEDURE — 76805 OB US >/= 14 WKS SNGL FETUS: CPT | Performed by: OBSTETRICS & GYNECOLOGY

## 2020-02-20 PROCEDURE — 99213 OFFICE O/P EST LOW 20 MIN: CPT | Performed by: OBSTETRICS & GYNECOLOGY

## 2020-02-20 PROCEDURE — 76817 TRANSVAGINAL US OBSTETRIC: CPT | Performed by: OBSTETRICS & GYNECOLOGY

## 2020-02-20 NOTE — PROGRESS NOTES
A transvaginal ultrasound was performed  Sonographer note on use of High Level Disinfection Process (Trophon) for transvaginal probe# 3 used, serial [id number]    [address] [facility] [address] Ivan Mayorga

## 2020-03-19 ENCOUNTER — ROUTINE PRENATAL (OUTPATIENT)
Dept: OBGYN CLINIC | Facility: CLINIC | Age: 35
End: 2020-03-19

## 2020-03-19 VITALS — DIASTOLIC BLOOD PRESSURE: 72 MMHG | WEIGHT: 145 LBS | SYSTOLIC BLOOD PRESSURE: 116 MMHG | BODY MASS INDEX: 24.89 KG/M2

## 2020-03-19 DIAGNOSIS — Z34.82 PRENATAL CARE, SUBSEQUENT PREGNANCY, SECOND TRIMESTER: Primary | ICD-10-CM

## 2020-03-19 PROCEDURE — PNV: Performed by: PHYSICIAN ASSISTANT

## 2020-03-19 NOTE — PROGRESS NOTES
Visit: Good FM, No N/V, HA, cramping, VB, LOF, edema, domestic violence, or smoking  Tolerating PNV  Has follow up with [facility] at 32 weeks to evaluate low lying placenta  Continue routine prenatal care  Return to office in 4 weeks for ob check

## 2020-04-03 ENCOUNTER — HOSPITAL ENCOUNTER (OUTPATIENT)
Facility: HOSPITAL | Age: 35
Discharge: HOME | End: 2020-04-03
Attending: OBSTETRICS & GYNECOLOGY | Admitting: OBSTETRICS & GYNECOLOGY
Payer: COMMERCIAL

## 2020-04-03 ENCOUNTER — HOSPITAL ENCOUNTER (OUTPATIENT)
Facility: HOSPITAL | Age: 35
End: 2020-04-03
Attending: OBSTETRICS & GYNECOLOGY | Admitting: OBSTETRICS & GYNECOLOGY
Payer: COMMERCIAL

## 2020-04-03 VITALS
SYSTOLIC BLOOD PRESSURE: 104 MMHG | HEART RATE: 72 BPM | OXYGEN SATURATION: 100 % | TEMPERATURE: 98.6 F | DIASTOLIC BLOOD PRESSURE: 59 MMHG | RESPIRATION RATE: 18 BRPM

## 2020-04-03 PROBLEM — Z3A.26 26 WEEKS GESTATION OF PREGNANCY: Status: ACTIVE | Noted: 2020-02-20

## 2020-04-03 PROCEDURE — NC001 PR NO CHARGE: Performed by: OBSTETRICS & GYNECOLOGY

## 2020-04-03 PROCEDURE — 76815 OB US LIMITED FETUS(S): CPT | Performed by: OBSTETRICS & GYNECOLOGY

## 2020-04-03 PROCEDURE — G0463 HOSPITAL OUTPT CLINIC VISIT: HCPCS

## 2020-04-03 PROCEDURE — 99214 OFFICE O/P EST MOD 30 MIN: CPT

## 2020-04-09 ENCOUNTER — ROUTINE PRENATAL (OUTPATIENT)
Dept: OBGYN CLINIC | Facility: CLINIC | Age: 35
End: 2020-04-09
Payer: COMMERCIAL

## 2020-04-09 VITALS
HEIGHT: 64 IN | DIASTOLIC BLOOD PRESSURE: 76 MMHG | SYSTOLIC BLOOD PRESSURE: 134 MMHG | BODY MASS INDEX: 25.4 KG/M2 | WEIGHT: 148.8 LBS

## 2020-04-09 DIAGNOSIS — R30.0 DYSURIA: ICD-10-CM

## 2020-04-09 DIAGNOSIS — Z3A.28 28 WEEKS GESTATION OF PREGNANCY: Primary | ICD-10-CM

## 2020-04-09 DIAGNOSIS — Z34.82 PRENATAL CARE, SUBSEQUENT PREGNANCY, SECOND TRIMESTER: ICD-10-CM

## 2020-04-09 DIAGNOSIS — Z23 NEED FOR TDAP VACCINATION: ICD-10-CM

## 2020-04-09 PROCEDURE — 87086 URINE CULTURE/COLONY COUNT: CPT | Performed by: OBSTETRICS & GYNECOLOGY

## 2020-04-09 PROCEDURE — 90715 TDAP VACCINE 7 YRS/> IM: CPT

## 2020-04-09 PROCEDURE — 90471 IMMUNIZATION ADMIN: CPT

## 2020-04-09 PROCEDURE — PNV: Performed by: OBSTETRICS & GYNECOLOGY

## 2020-04-10 LAB — BACTERIA UR CULT: NORMAL

## 2020-04-17 LAB
BASOPHILS # BLD AUTO: 38 CELLS/UL (ref 0–200)
BASOPHILS NFR BLD AUTO: 0.4 %
EOSINOPHIL # BLD AUTO: 143 CELLS/UL (ref 15–500)
EOSINOPHIL NFR BLD AUTO: 1.5 %
ERYTHROCYTE [DISTWIDTH] IN BLOOD BY AUTOMATED COUNT: 12.8 % (ref 11–15)
GLUCOSE 1H P 50 G GLC PO SERPL-MCNC: 132 MG/DL
HCT VFR BLD AUTO: 31.8 % (ref 35–45)
HGB BLD-MCNC: 10.4 G/DL (ref 11.7–15.5)
LYMPHOCYTES # BLD AUTO: 1473 CELLS/UL (ref 850–3900)
LYMPHOCYTES NFR BLD AUTO: 15.5 %
MCH RBC QN AUTO: 26.3 PG (ref 27–33)
MCHC RBC AUTO-ENTMCNC: 32.7 G/DL (ref 32–36)
MCV RBC AUTO: 80.5 FL (ref 80–100)
MONOCYTES # BLD AUTO: 599 CELLS/UL (ref 200–950)
MONOCYTES NFR BLD AUTO: 6.3 %
NEUTROPHILS # BLD AUTO: 7249 CELLS/UL (ref 1500–7800)
NEUTROPHILS NFR BLD AUTO: 76.3 %
PLATELET # BLD AUTO: 206 THOUSAND/UL (ref 140–400)
PMV BLD REES-ECKER: 12.3 FL (ref 7.5–12.5)
RBC # BLD AUTO: 3.95 MILLION/UL (ref 3.8–5.1)
RPR SER QL: NORMAL
T4 FREE SERPL-MCNC: 1.2 NG/DL (ref 0.8–1.8)
TSH SERPL-ACNC: 1.06 MIU/L
WBC # BLD AUTO: 9.5 THOUSAND/UL (ref 3.8–10.8)

## 2020-04-24 ENCOUNTER — TELEMEDICINE (OUTPATIENT)
Dept: OBGYN CLINIC | Facility: CLINIC | Age: 35
End: 2020-04-24

## 2020-04-24 VITALS — SYSTOLIC BLOOD PRESSURE: 135 MMHG | WEIGHT: 150 LBS | DIASTOLIC BLOOD PRESSURE: 87 MMHG | BODY MASS INDEX: 25.75 KG/M2

## 2020-04-24 DIAGNOSIS — Z3A.29 PREGNANCY WITH 29 COMPLETED WEEKS GESTATION: Primary | ICD-10-CM

## 2020-04-24 PROCEDURE — PNV: Performed by: OBSTETRICS & GYNECOLOGY

## 2020-04-28 ENCOUNTER — TELEPHONE (OUTPATIENT)
Dept: OBGYN CLINIC | Facility: CLINIC | Age: 35
End: 2020-04-28

## 2020-05-04 ENCOUNTER — ROUTINE PRENATAL (OUTPATIENT)
Dept: OBGYN CLINIC | Facility: CLINIC | Age: 35
End: 2020-05-04

## 2020-05-04 VITALS — BODY MASS INDEX: 26.09 KG/M2 | WEIGHT: 152 LBS | DIASTOLIC BLOOD PRESSURE: 72 MMHG | SYSTOLIC BLOOD PRESSURE: 118 MMHG

## 2020-05-04 DIAGNOSIS — Z3A.30 30 WEEKS GESTATION OF PREGNANCY: ICD-10-CM

## 2020-05-04 PROCEDURE — PNV: Performed by: PHYSICIAN ASSISTANT

## 2020-05-13 ENCOUNTER — TELEPHONE (OUTPATIENT)
Dept: PERINATAL CARE | Facility: CLINIC | Age: 35
End: 2020-05-13

## 2020-05-14 ENCOUNTER — ULTRASOUND (OUTPATIENT)
Dept: PERINATAL CARE | Facility: CLINIC | Age: 35
End: 2020-05-14
Payer: COMMERCIAL

## 2020-05-14 VITALS
HEART RATE: 79 BPM | DIASTOLIC BLOOD PRESSURE: 67 MMHG | TEMPERATURE: 98.8 F | SYSTOLIC BLOOD PRESSURE: 119 MMHG | WEIGHT: 154 LBS | BODY MASS INDEX: 26.29 KG/M2 | HEIGHT: 64 IN

## 2020-05-14 DIAGNOSIS — Z3A.32 32 WEEKS GESTATION OF PREGNANCY: ICD-10-CM

## 2020-05-14 DIAGNOSIS — O44.42 LOW LYING PLACENTA NOS OR WITHOUT HEMORRHAGE, SECOND TRIMESTER: Primary | ICD-10-CM

## 2020-05-14 PROCEDURE — 76817 TRANSVAGINAL US OBSTETRIC: CPT | Performed by: OBSTETRICS & GYNECOLOGY

## 2020-05-14 PROCEDURE — 76816 OB US FOLLOW-UP PER FETUS: CPT | Performed by: OBSTETRICS & GYNECOLOGY

## 2020-05-18 ENCOUNTER — TELEPHONE (OUTPATIENT)
Dept: OBGYN CLINIC | Facility: CLINIC | Age: 35
End: 2020-05-18

## 2020-05-19 ENCOUNTER — ROUTINE PRENATAL (OUTPATIENT)
Dept: OBGYN CLINIC | Facility: CLINIC | Age: 35
End: 2020-05-19

## 2020-05-19 VITALS
TEMPERATURE: 98.4 F | WEIGHT: 154 LBS | SYSTOLIC BLOOD PRESSURE: 116 MMHG | DIASTOLIC BLOOD PRESSURE: 74 MMHG | BODY MASS INDEX: 26.43 KG/M2

## 2020-05-19 DIAGNOSIS — Z23 NEED FOR TDAP VACCINATION: ICD-10-CM

## 2020-05-19 DIAGNOSIS — N89.8 VAGINAL DISCHARGE: ICD-10-CM

## 2020-05-19 DIAGNOSIS — Z3A.32 32 WEEKS GESTATION OF PREGNANCY: ICD-10-CM

## 2020-05-19 DIAGNOSIS — Z34.83 PRENATAL CARE, SUBSEQUENT PREGNANCY, THIRD TRIMESTER: Primary | ICD-10-CM

## 2020-05-19 PROCEDURE — PNV: Performed by: PHYSICIAN ASSISTANT

## 2020-05-23 LAB
BACTERIA GENITAL AEROBE CULT: NORMAL
Lab: NORMAL

## 2020-05-26 ENCOUNTER — TELEPHONE (OUTPATIENT)
Dept: OBGYN CLINIC | Facility: CLINIC | Age: 35
End: 2020-05-26

## 2020-05-27 ENCOUNTER — ROUTINE PRENATAL (OUTPATIENT)
Dept: OBGYN CLINIC | Facility: CLINIC | Age: 35
End: 2020-05-27

## 2020-05-27 VITALS
TEMPERATURE: 98.3 F | WEIGHT: 155 LBS | SYSTOLIC BLOOD PRESSURE: 124 MMHG | HEIGHT: 64 IN | DIASTOLIC BLOOD PRESSURE: 74 MMHG | BODY MASS INDEX: 26.46 KG/M2

## 2020-05-27 DIAGNOSIS — Z3A.34 34 WEEKS GESTATION OF PREGNANCY: ICD-10-CM

## 2020-05-27 PROCEDURE — PNV: Performed by: PHYSICIAN ASSISTANT

## 2020-05-29 LAB
BASOPHILS # BLD AUTO: 47 CELLS/UL (ref 0–200)
BASOPHILS NFR BLD AUTO: 0.4 %
EOSINOPHIL # BLD AUTO: 129 CELLS/UL (ref 15–500)
EOSINOPHIL NFR BLD AUTO: 1.1 %
ERYTHROCYTE [DISTWIDTH] IN BLOOD BY AUTOMATED COUNT: 13.4 % (ref 11–15)
HCT VFR BLD AUTO: 32.7 % (ref 35–45)
HGB BLD-MCNC: 10 G/DL (ref 11.7–15.5)
LYMPHOCYTES # BLD AUTO: 1661 CELLS/UL (ref 850–3900)
LYMPHOCYTES NFR BLD AUTO: 14.2 %
MCH RBC QN AUTO: 23.7 PG (ref 27–33)
MCHC RBC AUTO-ENTMCNC: 30.6 G/DL (ref 32–36)
MCV RBC AUTO: 77.5 FL (ref 80–100)
MONOCYTES # BLD AUTO: 796 CELLS/UL (ref 200–950)
MONOCYTES NFR BLD AUTO: 6.8 %
NEUTROPHILS # BLD AUTO: 9068 CELLS/UL (ref 1500–7800)
NEUTROPHILS NFR BLD AUTO: 77.5 %
PLATELET # BLD AUTO: 211 THOUSAND/UL (ref 140–400)
PMV BLD REES-ECKER: 11.6 FL (ref 7.5–12.5)
RBC # BLD AUTO: 4.22 MILLION/UL (ref 3.8–5.1)
T4 FREE SERPL-MCNC: 1.1 NG/DL (ref 0.8–1.8)
TSH SERPL-ACNC: 0.77 MIU/L
WBC # BLD AUTO: 11.7 THOUSAND/UL (ref 3.8–10.8)

## 2020-06-12 ENCOUNTER — ROUTINE PRENATAL (OUTPATIENT)
Dept: OBGYN CLINIC | Facility: CLINIC | Age: 35
End: 2020-06-12

## 2020-06-12 VITALS
DIASTOLIC BLOOD PRESSURE: 76 MMHG | WEIGHT: 157.4 LBS | TEMPERATURE: 97.6 F | BODY MASS INDEX: 26.87 KG/M2 | SYSTOLIC BLOOD PRESSURE: 124 MMHG | HEIGHT: 64 IN

## 2020-06-12 DIAGNOSIS — Z34.83 PRENATAL CARE, SUBSEQUENT PREGNANCY, THIRD TRIMESTER: ICD-10-CM

## 2020-06-12 DIAGNOSIS — Z3A.36 36 WEEKS GESTATION OF PREGNANCY: Primary | ICD-10-CM

## 2020-06-12 PROCEDURE — PNV: Performed by: OBSTETRICS & GYNECOLOGY

## 2020-06-12 RX ORDER — DOXYCYCLINE HYCLATE 50 MG/1
324 CAPSULE, GELATIN COATED ORAL
COMMUNITY
End: 2020-08-24 | Stop reason: ALTCHOICE

## 2020-06-18 ENCOUNTER — ROUTINE PRENATAL (OUTPATIENT)
Dept: OBGYN CLINIC | Facility: CLINIC | Age: 35
End: 2020-06-18

## 2020-06-18 VITALS
BODY MASS INDEX: 27.12 KG/M2 | SYSTOLIC BLOOD PRESSURE: 120 MMHG | DIASTOLIC BLOOD PRESSURE: 72 MMHG | WEIGHT: 158 LBS | TEMPERATURE: 98 F

## 2020-06-18 DIAGNOSIS — N64.89 BREAST ASYMMETRY: Primary | ICD-10-CM

## 2020-06-18 DIAGNOSIS — E04.2 MULTIPLE THYROID NODULES: Primary | ICD-10-CM

## 2020-06-18 DIAGNOSIS — Z80.3 FAMILY HISTORY OF BREAST CANCER: ICD-10-CM

## 2020-06-18 PROCEDURE — PNV: Performed by: PHYSICIAN ASSISTANT

## 2020-06-22 ENCOUNTER — HOSPITAL ENCOUNTER (OUTPATIENT)
Dept: ULTRASOUND IMAGING | Facility: CLINIC | Age: 35
Discharge: HOME | End: 2020-06-22
Payer: COMMERCIAL

## 2020-06-22 VITALS — HEIGHT: 64 IN | BODY MASS INDEX: 26.98 KG/M2 | WEIGHT: 158 LBS

## 2020-06-22 DIAGNOSIS — N64.89 BREAST ASYMMETRY: ICD-10-CM

## 2020-06-22 DIAGNOSIS — Z80.3 FAMILY HISTORY OF BREAST CANCER: ICD-10-CM

## 2020-06-22 PROCEDURE — 76642 ULTRASOUND BREAST LIMITED: CPT

## 2020-06-25 ENCOUNTER — ROUTINE PRENATAL (OUTPATIENT)
Dept: OBGYN CLINIC | Facility: CLINIC | Age: 35
End: 2020-06-25

## 2020-06-25 VITALS
DIASTOLIC BLOOD PRESSURE: 78 MMHG | SYSTOLIC BLOOD PRESSURE: 112 MMHG | BODY MASS INDEX: 27.29 KG/M2 | TEMPERATURE: 97.6 F | WEIGHT: 159 LBS

## 2020-06-25 DIAGNOSIS — Z34.83 PRENATAL CARE, SUBSEQUENT PREGNANCY, THIRD TRIMESTER: Primary | ICD-10-CM

## 2020-06-25 PROCEDURE — PNV: Performed by: PHYSICIAN ASSISTANT

## 2020-07-01 ENCOUNTER — TELEPHONE (OUTPATIENT)
Dept: OBGYN CLINIC | Facility: CLINIC | Age: 35
End: 2020-07-01

## 2020-07-01 NOTE — TELEPHONE ENCOUNTER
39wks last night pain in upper right side that wrapped around to back  Never had previously  Did resolve  Wants to know if normal since she was slightly concerned

## 2020-07-02 ENCOUNTER — ROUTINE PRENATAL (OUTPATIENT)
Dept: OBGYN CLINIC | Facility: CLINIC | Age: 35
End: 2020-07-02

## 2020-07-02 ENCOUNTER — TELEPHONE (OUTPATIENT)
Dept: OBGYN CLINIC | Facility: CLINIC | Age: 35
End: 2020-07-02

## 2020-07-02 VITALS
BODY MASS INDEX: 27.12 KG/M2 | SYSTOLIC BLOOD PRESSURE: 118 MMHG | DIASTOLIC BLOOD PRESSURE: 78 MMHG | TEMPERATURE: 97.3 F | WEIGHT: 158 LBS

## 2020-07-02 DIAGNOSIS — Z34.83 PRENATAL CARE, SUBSEQUENT PREGNANCY, THIRD TRIMESTER: Primary | ICD-10-CM

## 2020-07-02 PROCEDURE — PNV: Performed by: PHYSICIAN ASSISTANT

## 2020-07-08 ENCOUNTER — HOSPITAL ENCOUNTER (INPATIENT)
Facility: HOSPITAL | Age: 35
LOS: 2 days | Discharge: HOME | End: 2020-07-10
Attending: OBSTETRICS & GYNECOLOGY | Admitting: OBSTETRICS & GYNECOLOGY
Payer: COMMERCIAL

## 2020-07-08 ENCOUNTER — ANESTHESIA EVENT (INPATIENT)
Dept: ANESTHESIOLOGY | Facility: HOSPITAL | Age: 35
End: 2020-07-08
Payer: COMMERCIAL

## 2020-07-08 ENCOUNTER — HOSPITAL ENCOUNTER (OUTPATIENT)
Dept: LABOR AND DELIVERY | Facility: HOSPITAL | Age: 35
Discharge: HOME | End: 2020-07-08
Payer: COMMERCIAL

## 2020-07-08 ENCOUNTER — ANESTHESIA (INPATIENT)
Dept: ANESTHESIOLOGY | Facility: HOSPITAL | Age: 35
End: 2020-07-08
Payer: COMMERCIAL

## 2020-07-08 DIAGNOSIS — Z3A.40 40 WEEKS GESTATION OF PREGNANCY: Primary | ICD-10-CM

## 2020-07-08 LAB
ABO GROUP BLD: NORMAL
BASE EXCESS BLDCOA CALC-SCNC: -1.3 MMOL/L (ref 3–11)
BASE EXCESS BLDCOV CALC-SCNC: -2.7 MMOL/L (ref 1–9)
BASOPHILS # BLD AUTO: 0.03 THOUSANDS/ΜL (ref 0–0.1)
BASOPHILS NFR BLD AUTO: 0 % (ref 0–1)
BLD GP AB SCN SERPL QL: NEGATIVE
EOSINOPHIL # BLD AUTO: 0.11 THOUSAND/ΜL (ref 0–0.61)
EOSINOPHIL NFR BLD AUTO: 2 % (ref 0–6)
ERYTHROCYTE [DISTWIDTH] IN BLOOD BY AUTOMATED COUNT: 17.3 % (ref 11.6–15.1)
EXTERNAL GROUP B STREP ANTIGEN: POSITIVE
HCO3 BLDCOA-SCNC: 26.9 MMOL/L (ref 17.3–27.3)
HCO3 BLDCOV-SCNC: 20.7 MMOL/L (ref 12.2–28.6)
HCT VFR BLD AUTO: 36.6 % (ref 34.8–46.1)
HGB BLD-MCNC: 11 G/DL (ref 11.5–15.4)
IMM GRANULOCYTES # BLD AUTO: 0.04 THOUSAND/UL (ref 0–0.2)
IMM GRANULOCYTES NFR BLD AUTO: 1 % (ref 0–2)
LYMPHOCYTES # BLD AUTO: 1.44 THOUSANDS/ΜL (ref 0.6–4.47)
LYMPHOCYTES NFR BLD AUTO: 19 % (ref 14–44)
MCH RBC QN AUTO: 23.4 PG (ref 26.8–34.3)
MCHC RBC AUTO-ENTMCNC: 30.1 G/DL (ref 31.4–37.4)
MCV RBC AUTO: 78 FL (ref 82–98)
MONOCYTES # BLD AUTO: 0.53 THOUSAND/ΜL (ref 0.17–1.22)
MONOCYTES NFR BLD AUTO: 7 % (ref 4–12)
NEUTROPHILS # BLD AUTO: 5.36 THOUSANDS/ΜL (ref 1.85–7.62)
NEUTS SEG NFR BLD AUTO: 71 % (ref 43–75)
NRBC BLD AUTO-RTO: 0 /100 WBCS
O2 CT VFR BLDCOA CALC: 6.3 ML/DL
OXYHGB MFR BLDCOA: 27.6 %
OXYHGB MFR BLDCOV: 81 %
PCO2 BLDCOA: 58.7 MM[HG] (ref 30–60)
PCO2 BLDCOV: 32.7 MM HG (ref 27–43)
PH BLDCOA: 7.28 [PH] (ref 7.23–7.43)
PH BLDCOV: 7.42 [PH] (ref 7.19–7.49)
PLATELET # BLD AUTO: 196 THOUSANDS/UL (ref 149–390)
PMV BLD AUTO: 11.6 FL (ref 8.9–12.7)
PO2 BLDCOA: 15.5 MM HG (ref 5–25)
PO2 BLDCOV: 38.1 MM HG (ref 15–45)
RBC # BLD AUTO: 4.71 MILLION/UL (ref 3.81–5.12)
RH BLD: POSITIVE
RPR SER QL: NORMAL
SAO2 % BLDCOV: 18.2 ML/DL
SPECIMEN EXPIRATION DATE: NORMAL
WBC # BLD AUTO: 7.51 THOUSAND/UL (ref 4.31–10.16)

## 2020-07-08 PROCEDURE — 86901 BLOOD TYPING SEROLOGIC RH(D): CPT | Performed by: OBSTETRICS & GYNECOLOGY

## 2020-07-08 PROCEDURE — 85025 COMPLETE CBC W/AUTO DIFF WBC: CPT | Performed by: OBSTETRICS & GYNECOLOGY

## 2020-07-08 PROCEDURE — 59400 OBSTETRICAL CARE: CPT | Performed by: OBSTETRICS & GYNECOLOGY

## 2020-07-08 PROCEDURE — 10907ZC DRAINAGE OF AMNIOTIC FLUID, THERAPEUTIC FROM PRODUCTS OF CONCEPTION, VIA NATURAL OR ARTIFICIAL OPENING: ICD-10-PCS | Performed by: OBSTETRICS & GYNECOLOGY

## 2020-07-08 PROCEDURE — 4A1HXCZ MONITORING OF PRODUCTS OF CONCEPTION, CARDIAC RATE, EXTERNAL APPROACH: ICD-10-PCS | Performed by: OBSTETRICS & GYNECOLOGY

## 2020-07-08 PROCEDURE — 99024 POSTOP FOLLOW-UP VISIT: CPT | Performed by: OBSTETRICS & GYNECOLOGY

## 2020-07-08 PROCEDURE — 86592 SYPHILIS TEST NON-TREP QUAL: CPT | Performed by: OBSTETRICS & GYNECOLOGY

## 2020-07-08 PROCEDURE — 3E033VJ INTRODUCTION OF OTHER HORMONE INTO PERIPHERAL VEIN, PERCUTANEOUS APPROACH: ICD-10-PCS | Performed by: OBSTETRICS & GYNECOLOGY

## 2020-07-08 PROCEDURE — 86850 RBC ANTIBODY SCREEN: CPT | Performed by: OBSTETRICS & GYNECOLOGY

## 2020-07-08 PROCEDURE — 82805 BLOOD GASES W/O2 SATURATION: CPT | Performed by: OBSTETRICS & GYNECOLOGY

## 2020-07-08 PROCEDURE — 86900 BLOOD TYPING SEROLOGIC ABO: CPT | Performed by: OBSTETRICS & GYNECOLOGY

## 2020-07-08 RX ORDER — DOCUSATE SODIUM 100 MG/1
100 CAPSULE, LIQUID FILLED ORAL 2 TIMES DAILY PRN
Status: DISCONTINUED | OUTPATIENT
Start: 2020-07-08 | End: 2020-07-10 | Stop reason: HOSPADM

## 2020-07-08 RX ORDER — OXYTOCIN/RINGER'S LACTATE 30/500 ML
PLASTIC BAG, INJECTION (ML) INTRAVENOUS
Status: COMPLETED
Start: 2020-07-08 | End: 2020-07-08

## 2020-07-08 RX ORDER — LIDOCAINE HYDROCHLORIDE AND EPINEPHRINE 15; 5 MG/ML; UG/ML
INJECTION, SOLUTION EPIDURAL
Status: COMPLETED | OUTPATIENT
Start: 2020-07-08 | End: 2020-07-08

## 2020-07-08 RX ORDER — IBUPROFEN 600 MG/1
600 TABLET ORAL EVERY 6 HOURS PRN
Status: DISCONTINUED | OUTPATIENT
Start: 2020-07-08 | End: 2020-07-10 | Stop reason: HOSPADM

## 2020-07-08 RX ORDER — DIAPER,BRIEF,INFANT-TODD,DISP
1 EACH MISCELLANEOUS 4 TIMES DAILY PRN
Status: DISCONTINUED | OUTPATIENT
Start: 2020-07-08 | End: 2020-07-10 | Stop reason: HOSPADM

## 2020-07-08 RX ORDER — SODIUM CHLORIDE, SODIUM LACTATE, POTASSIUM CHLORIDE, CALCIUM CHLORIDE 600; 310; 30; 20 MG/100ML; MG/100ML; MG/100ML; MG/100ML
125 INJECTION, SOLUTION INTRAVENOUS CONTINUOUS
Status: DISCONTINUED | OUTPATIENT
Start: 2020-07-08 | End: 2020-07-10 | Stop reason: HOSPADM

## 2020-07-08 RX ORDER — ACETAMINOPHEN 325 MG/1
650 TABLET ORAL EVERY 6 HOURS PRN
Status: DISCONTINUED | OUTPATIENT
Start: 2020-07-08 | End: 2020-07-10 | Stop reason: HOSPADM

## 2020-07-08 RX ORDER — MAGNESIUM HYDROXIDE/ALUMINUM HYDROXICE/SIMETHICONE 120; 1200; 1200 MG/30ML; MG/30ML; MG/30ML
15 SUSPENSION ORAL EVERY 6 HOURS PRN
Status: DISCONTINUED | OUTPATIENT
Start: 2020-07-08 | End: 2020-07-10 | Stop reason: HOSPADM

## 2020-07-08 RX ORDER — DIPHENHYDRAMINE HCL 25 MG
25 TABLET ORAL EVERY 6 HOURS PRN
Status: DISCONTINUED | OUTPATIENT
Start: 2020-07-08 | End: 2020-07-10 | Stop reason: HOSPADM

## 2020-07-08 RX ORDER — OXYTOCIN/RINGER'S LACTATE 30/500 ML
1-30 PLASTIC BAG, INJECTION (ML) INTRAVENOUS
Status: DISCONTINUED | OUTPATIENT
Start: 2020-07-08 | End: 2020-07-08

## 2020-07-08 RX ADMIN — SODIUM CHLORIDE, SODIUM LACTATE, POTASSIUM CHLORIDE, AND CALCIUM CHLORIDE 125 ML/HR: .6; .31; .03; .02 INJECTION, SOLUTION INTRAVENOUS at 14:50

## 2020-07-08 RX ADMIN — ROPIVACAINE HYDROCHLORIDE: 2 INJECTION, SOLUTION EPIDURAL; INFILTRATION at 15:10

## 2020-07-08 RX ADMIN — SODIUM CHLORIDE 2.5 MILLION UNITS: 9 INJECTION, SOLUTION INTRAVENOUS at 16:43

## 2020-07-08 RX ADMIN — SODIUM CHLORIDE 2.5 MILLION UNITS: 9 INJECTION, SOLUTION INTRAVENOUS at 12:55

## 2020-07-08 RX ADMIN — Medication 2 MILLI-UNITS/MIN: at 10:14

## 2020-07-08 RX ADMIN — SODIUM CHLORIDE, SODIUM LACTATE, POTASSIUM CHLORIDE, AND CALCIUM CHLORIDE 125 ML/HR: .6; .31; .03; .02 INJECTION, SOLUTION INTRAVENOUS at 08:55

## 2020-07-08 RX ADMIN — DOCUSATE SODIUM 100 MG: 100 CAPSULE, LIQUID FILLED ORAL at 19:23

## 2020-07-08 RX ADMIN — SODIUM CHLORIDE 5 MILLION UNITS: 0.9 INJECTION, SOLUTION INTRAVENOUS at 08:55

## 2020-07-08 RX ADMIN — HYDROCORTISONE 1 APPLICATION: 1 CREAM TOPICAL at 19:23

## 2020-07-08 RX ADMIN — WITCH HAZEL 1 PAD: 500 SOLUTION RECTAL; TOPICAL at 19:23

## 2020-07-08 RX ADMIN — LIDOCAINE HYDROCHLORIDE AND EPINEPHRINE 3 ML: 15; 5 INJECTION, SOLUTION EPIDURAL at 15:01

## 2020-07-08 RX ADMIN — IBUPROFEN 600 MG: 600 TABLET ORAL at 21:17

## 2020-07-08 RX ADMIN — BENZOCAINE AND LEVOMENTHOL: 200; 5 SPRAY TOPICAL at 19:23

## 2020-07-08 NOTE — OB LABOR/OXYTOCIN SAFETY PROGRESS
Oxytocin Safety Progress Check Note - Jac Nowak 30 y o  female MRN: [id number]    Unit/Bed#: -01 Encounter: [id number]    Dose (joe-units/min) Oxytocin: 18 joe-units/min  Contraction Frequency (minutes): 2-7  Contraction Quality: Mild, Moderate  Tachysystole: No   Dilation: 3        Effacement (%): 80  Station: -1  Baseline Rate: 130 bpm  Fetal Heart Rate: 139 BPM  FHR Category: Category I             Notes/comments:   arom clear, planning to get epidural next, will continue to monitor and manage    Shalini Brewer MD 7/8/2020 2:44 PM

## 2020-07-08 NOTE — DISCHARGE SUMMARY
Discharge Summary -   Atul Mullen 30 y o  female MRN: [id number]  Unit/Bed#: -01 Encounter: [id number]    Admission Date: 7/8/2020     Discharge Date: 07/10/20    Admitting Attending: Rogena Collet, MD  Delivering Attending: Rogena Collet, MD  Discharging Attending: Hira Silva MD    Principal Diagnosis: Term pregnancy  Induced labor  Single fetus  Positive gbs    Secondary Diagnosis: Term pregnancy  Induced labor  Single fetus  GBS positive    Procedures: Spontaneous vaginal delivery    Hospital Course: Patient delivered and underwent normal post delivery care  She is ambulating well, voiding on her own, passing flatus, and tolerating oral intake  Complications: None    Condition at discharge: stable     Discharge Medications:  Current Discharge Medication List      CONTINUE these medications which have NOT CHANGED    Details   calcium carbonate (OS-TONI) 600 MG tablet Take 600 mg by mouth 2 (two) times a day with meals      ferrous gluconate (FERGON) 324 mg tablet Take 324 mg by mouth daily with breakfast      Prenatal Vit-Fe Fumarate-FA (PRENATAL VITAMIN) 27-0 8 MG TABS Take by mouth             Discharge instructions/Information to patient and family:   See after visit summary for information provided to patient and family  Provisions for Follow-Up Care:  See after visit summary for information related to follow-up care and any pertinent home health orders  Disposition: See After Visit Summary for discharge disposition information      Planned Readmission: No    Rogena Collet, MD  7/8/2020  7:06 PM

## 2020-07-08 NOTE — OB LABOR/OXYTOCIN SAFETY PROGRESS
Oxytocin Safety Progress Check Note - Damian Momin 30 y o  female MRN: [id number]    Unit/Bed#: -01 Encounter: [id number]    Dose (joe-units/min) Oxytocin: 12 joe-units/min  Contraction Frequency (minutes): 2-3 5  Contraction Quality: Mild, Moderate  Tachysystole: No   Dilation: 1-2        Effacement (%): 80  Station: -2  Baseline Rate: 135 bpm  Fetal Heart Rate: 139 BPM  FHR Category: Category I             Notes/comments:   Patient feeling cramping/contractions  Will re evaluate for rupture at next check well applied but very posterior and uncomfortable for patient at this time      King Gregorio MD 7/8/2020 1:01 PM

## 2020-07-08 NOTE — ANESTHESIA PROCEDURE NOTES
Epidural Block    Start time: 7/8/2020 2:59 PM  Reason for block: procedure for pain and at surgeon's request  Staffing  Resident/CRNA: Shonna Albert CRNA  Preanesthetic Checklist  Completed: patient identified, site marked, surgical consent, pre-op evaluation, timeout performed, IV checked, risks and benefits discussed and monitors and equipment checked  Epidural  Patient position: sitting  Prep: ChloraPrep  Patient monitoring: cardiac monitor and frequent blood pressure checks  Approach: midline  Location: lumbar (1-5)  Injection technique: LEXUS saline  Needle  Needle type: Tuohy   Needle gauge: 18 G  Catheter type: side hole  Catheter size: 20 G  Catheter at skin depth: 10 cm  Test dose: negativelidocaine 1 5% with epinephrine 1:200,000 test dose, 3 mLnegative aspiration for CSF, negative aspiration for heme and no paresthesia on injection  patient tolerated the procedure well with no immediate complications  Additional Notes  One attempt by CRNA Milas Sicard, easy placement

## 2020-07-08 NOTE — PLAN OF CARE
Problem: POSTPARTUM  Goal: Experiences normal postpartum course  Description  INTERVENTIONS:  - Monitor maternal vital signs  - Assess uterine involution and lochia  Outcome: Progressing  Goal: Appropriate maternal -  bonding  Description  INTERVENTIONS:  - Identify family support  - Assess for appropriate maternal/infant bonding   -Encourage maternal/infant bonding opportunities  - Referral to  or  as needed  Outcome: Progressing  Goal: Establishment of infant feeding pattern  Description  INTERVENTIONS:  - Assess breast/bottle feeding  - Refer to lactation as needed  Outcome: Progressing  Goal: Incision(s), wounds(s) or drain site(s) healing without S/S of infection  Description  INTERVENTIONS  - Assess and document risk factors for skin impairment   - Assess and document dressing, incision, wound bed, drain sites and surrounding tissue  - Consider nutrition services referral as needed  - Oral mucous membranes remain intact  - Provide patient/ family education  Outcome: Progressing     Problem: DISCHARGE PLANNING - CARE MANAGEMENT  Goal: Discharge to post-acute care or home with appropriate resources  Description  INTERVENTIONS:  - Conduct assessment to determine patient/family and health care team treatment goals, and need for post-acute services based on payer coverage, community resources, and patient preferences, and barriers to discharge  - Address psychosocial, clinical, and financial barriers to discharge as identified in assessment in conjunction with the patient/family and health care team  - Arrange appropriate level of post-acute services according to patients   needs and preference and payer coverage in collaboration with the physician and health care team  - Communicate with and update the patient/family, physician, and health care team regarding progress on the discharge plan  - Arrange appropriate transportation to post-acute venues  Outcome: Progressing Problem: PAIN - ADULT  Goal: Verbalizes/displays adequate comfort level or baseline comfort level  Description  Interventions:  - Encourage patient to monitor pain and request assistance  - Assess pain using appropriate pain scale  - Administer analgesics based on type and severity of pain and evaluate response  - Implement non-pharmacological measures as appropriate and evaluate response  - Consider cultural and social influences on pain and pain management  - Notify physician/advanced practitioner if interventions unsuccessful or patient reports new pain  Outcome: Progressing     Problem: INFECTION - ADULT  Goal: Absence or prevention of progression during hospitalization  Description  INTERVENTIONS:  - Assess and monitor for signs and symptoms of infection  - Monitor lab/diagnostic results  - Monitor all insertion sites, i e  indwelling lines, tubes, and drains  - Monitor endotracheal if appropriate and nasal secretions for changes in amount and color  - [place] appropriate cooling/warming therapies per order  - Administer medications as ordered  - Instruct and encourage patient and family to use good hand hygiene technique  - Identify and instruct in appropriate isolation precautions for identified infection/condition  Outcome: Progressing  Goal: Absence of fever/infection during neutropenic period  Description  INTERVENTIONS:  - Monitor WBC    Outcome: Progressing     Problem: SAFETY ADULT  Goal: Patient will remain free of falls  Description  INTERVENTIONS:  - Assess patient frequently for physical needs  -  Identify cognitive and physical deficits and behaviors that affect risk of falls    -  [place] fall precautions as indicated by assessment   - Educate patient/family on patient safety including physical limitations  - Instruct patient to call for assistance with activity based on assessment  - Modify environment to reduce risk of injury  - Consider OT/PT consult to assist with strengthening/mobility  Outcome: Progressing  Goal: Maintain or return to baseline ADL function  Description  INTERVENTIONS:  -  Assess patient's ability to carry out ADLs; assess patient's baseline for ADL function and identify physical deficits which impact ability to perform ADLs (bathing, care of mouth/teeth, toileting, grooming, dressing, etc )  - Assess/evaluate cause of self-care deficits   - Assess range of motion  - Assess patient's mobility; develop plan if impaired  - Assess patient's need for assistive devices and provide as appropriate  - Encourage maximum independence but intervene and supervise when necessary  - Involve family in performance of ADLs  - Assess for home care needs following discharge   - Consider OT consult to assist with ADL evaluation and planning for discharge  - Provide patient education as appropriate  Outcome: Progressing  Goal: Maintain or return mobility status to optimal level  Description  INTERVENTIONS:  - Assess patient's baseline mobility status (ambulation, transfers, stairs, etc )    - Identify cognitive and physical deficits and behaviors that affect mobility  - Identify mobility aids required to assist with transfers and/or ambulation (gait belt, sit-to-stand, lift, walker, cane, etc )  - [place] fall precautions as indicated by assessment  - Record patient progress and toleration of activity level on Mobility SBAR; progress patient to next Phase/Stage  - Instruct patient to call for assistance with activity based on assessment  - Consider rehabilitation consult to assist with strengthening/weightbearing, etc   Outcome: Progressing     Problem: DISCHARGE PLANNING  Goal: Discharge to home or other facility with appropriate resources  Description  INTERVENTIONS:  - Identify barriers to discharge w/patient and caregiver  - Arrange for needed discharge resources and transportation as appropriate  - Identify discharge learning needs (meds, wound care, etc )  - Arrange for interpretive services to assist at discharge as needed  - Refer to Case Management Department for coordinating discharge planning if the patient needs post-hospital services based on physician/advanced practitioner order or complex needs related to functional status, cognitive ability, or social support system  Outcome: Progressing     Problem: ALTERATION IN THE BREAST  Goal: Optimize infant feeding at the breast  Description  INTERVENTIONS:  - Latch, breast and nipple assessment  - Assess prior breast feeding history  - Hand expression of breast milk  - For cracked, bleeding and or sore nipples reassess latch, treat damaged nipple  -Educate mother on feeding cues  -Positioning/latch techniques  Outcome: Progressing     Problem: INADEQUATE LATCH, SUCK OR SWALLOW  Goal: Demonstrate ability to latch and sustain latch, audible swallowing and satiety  Description  INTERVENTIONS:  - Assess oral anatomy, notify Physician/AP for abnormal findings  - Establish milk expression  - Maximize feeding opportunity (skin to skin, behavioral state)  - Position/latch techniques  - Discourage use of pacifier-artificial nipple  - Mechanical pumping  - Nipple Shield  - Supplemental formula feeding (Physician/AP order)  - Alternative feeding method  Outcome: Progressing

## 2020-07-08 NOTE — H&P
H&P Exam - Obstetrics   Colette Subramanian 30 y o  female MRN: [id number]  Unit/Bed#: -01 Encounter: [id number]    Assessment/Plan     History of Present Illness   Chief Complaint: elective IOL at edc    HPI:  Colette Subramanian is a 30 y o   female with an JASON of 2020, by Ultrasound at 40w0d weeks gestation who is being admitted for induction of labor  Her current obstetrical history is significant for had low lying placenta which resolved  Contractions: rare  Leakage of fluid: None  Bleeding: None  Fetal movement: present  Pregnancy complications: none  Review of Systems    Historical Information   OB History    Para Term  AB Living   2 1 1     1   SAB TAB Ectopic Multiple Live Births         0 1      # Outcome Date GA Lbr William/2nd Weight Sex Delivery Anes PTL Lv   2 Current            1 Term 18 38w6d  2745 g (6 lb 0 8 oz) F Vag-Spont EPI N KAREN     Baby complications/comments:   Past Medical History:   Diagnosis Date    Amenorrhea     Contraceptive use     RESOLVED: [id number]    Disease of thyroid gland     stabilized no meds nodules    History of early menarche     AGE 13    Irregular menstrual cycle     LAST ASSESSED: [id number]    Spotting in pregnancy, antepartum condition or complication     RESOLVED: [id number]    Thyroid disease     Thyrotoxicosis 2009    Varicella     POS HX     Past Surgical History:   Procedure Laterality Date    WISDOM TOOTH EXTRACTION       Social History   Social History     Substance and Sexual Activity   Alcohol Use Yes    Frequency: Monthly or less    Drinks per session: 1 or 2    Binge frequency: Never    Comment: socially     Social History     Substance and Sexual Activity   Drug Use No     Social History     Tobacco Use   Smoking Status Never Smoker   Smokeless Tobacco Never Used         Meds/Allergies   PTA meds:   Prior to Admission Medications   Prescriptions Last Dose Informant Patient Reported? Taking?    Prenatal Vit-Fe Fumarate-FA (PRENATAL VITAMIN) 27-0 8 MG TABS  Self Yes No   Sig: Take by mouth   calcium carbonate (OS-TONI) 600 MG tablet  Self Yes No   Sig: Take 600 mg by mouth 2 (two) times a day with meals   ferrous gluconate (FERGON) 324 mg tablet   Yes No   Sig: Take 324 mg by mouth daily with breakfast      Facility-Administered Medications: None     No Known Allergies    Objective   Vitals: Blood pressure 116/60, pulse 71, temperature (!) 97 °F (36 1 °C), temperature source Oral, resp  rate 16, height 5' 4" (1 626 m), weight 71 7 kg (158 lb), last menstrual period 09/25/2019, not currently breastfeeding  Body mass index is 27 12 kg/m²      Invasive Devices     Peripheral Intravenous Line            Peripheral IV 07/08/20 Left Forearm less than 1 day                Physical Exam   Vitals:    07/08/20 1016   BP: 114/72   Pulse: 56   Resp:    Temp:      Lungs: CTA B  Heart: RRR S1 S2  Abd: Soft gravid non tender positive bowel sounds  vtx by u/s  Efw: 7 5 lb  Ext no edema no calf pain  Reflexes: 1+  Cervix: 1-2/70/-4  FHT: 130's reactive category 1  [place]: rare    Prenatal Labs:   Blood Type:   Lab Results   Component Value Date/Time    ABO Grouping O 12/12/2019 03:18 PM    ABO Grouping O 04/12/2018 01:31 AM    ABO Grouping O 09/19/2017 03:30 PM    ABO Grouping O 09/19/2017 03:30 PM     , D (Rh type):   Lab Results   Component Value Date/Time    Rh Factor RH(D) POSITIVE 09/19/2017 03:30 PM    Rh Factor RH(D) POSITIVE 09/19/2017 03:30 PM    Rh Type RH(D) POSITIVE 12/12/2019 03:18 PM     , Antibody Screen: No results found for: ANTIBODYSCR , HCT/HGB:   Lab Results   Component Value Date/Time    Hematocrit 36 6 07/08/2020 08:46 AM    Hematocrit 39 9 09/19/2017 03:30 PM    Hematocrit 39 9 09/19/2017 03:30 PM    Hemoglobin 11 0 (L) 07/08/2020 08:46 AM    Hemoglobin 13 5 09/19/2017 03:30 PM    Hemoglobin 13 5 09/19/2017 03:30 PM      , Platelets:   Lab Results   Component Value Date/Time    Platelets 915 87/39/6376 08:46 AM Platelets 360 3286 03:30 PM    Platelets 327  03:30 PM      , 1 hour Glucola:   Lab Results   Component Value Date/Time    Glucose 132 2020 08:43 AM   , Rubella: immune  Lab Results   Component Value Date/Time    RPR SCREEN NON-REACTIVE 2017 03:30 PM    RPR SCREEN NON-REACTIVE 2017 03:30 PM    RPR NON-REACTIVE 2020 08:43 AM    RPR Non-Reactive 2018 01:31 AM      , Hep B:   Lab Results   Component Value Date/Time    HEPATITIS B SURFACE ANTIGEN NON-REACTIVE 2017 03:30 PM    HEPATITIS B SURFACE ANTIGEN NON-REACTIVE 2017 03:30 PM    Hepatitis B Surface Ag negative 2019     , HIV:   Lab Results   Component Value Date/Time    HIV-1/HIV-2 AB Non-Reactive 2019     , Group B Strep:  GBS pos          Assessment:  31 y/o  at 40 weeks for elective iol    GBS pos, normal tft     Plan:  Pcn, oxytocin,

## 2020-07-08 NOTE — OB LABOR/OXYTOCIN SAFETY PROGRESS
Oxytocin Safety Progress Check Note - Sisi Sos 30 y o  female MRN: [id number]    Unit/Bed#: -01 Encounter: [id number]    Dose (joe-units/min) Oxytocin: 10 joe-units/min  Contraction Frequency (minutes): 3-4  Contraction Quality: Mild  Tachysystole: No   Baseline Rate: 135 bpm  Fetal Heart Rate: 146 BPM  FHR Category: Category I             Notes/comments:   Patient feeling some cramping  Will defer check at this time      Deepthi Johns MD 7/8/2020 11:54 AM

## 2020-07-08 NOTE — OB LABOR/OXYTOCIN SAFETY PROGRESS
Oxytocin Safety Progress Check Note - Abhijeet Olea 30 y o  female MRN: [id number]    Unit/Bed#: -01 Encounter: [id number]    Dose (joe-units/min) Oxytocin: 20 joe-units/min  Contraction Frequency (minutes): 2-3  Contraction Quality: Moderate  Tachysystole: No   Dilation: 4        Effacement (%): 80  Station: -1  Baseline Rate: 125 bpm  Fetal Heart Rate: 131 BPM  FHR Category: Category I             Notes/comments:   Patient comfortable with epidural, will continue to monitor and manage      Marce Savage MD 7/8/2020 4:37 PM

## 2020-07-08 NOTE — OB LABOR/OXYTOCIN SAFETY PROGRESS
Oxytocin Safety Progress Check Note - Elizabeth Gamboa 30 y o  female MRN: [id number]    Unit/Bed#: -01 Encounter: [id number]    Dose (joe-units/min) Oxytocin: 10 joe-units/min(per Dr Stanley Alarcon)  Contraction Frequency (minutes): 2-3  Contraction Quality: Moderate  Tachysystole: No   Dilation: 8        Effacement (%): 90  Station: 0  Baseline Rate: 135 bpm  Fetal Heart Rate: 131 BPM  FHR Category: Category II             Notes/comments:   Occasional variable overall reactive and reassuring  Will continue to monitor and manage      Chet Love MD 7/8/2020 5:52 PM

## 2020-07-08 NOTE — L&D DELIVERY NOTE
Delivery Note    Obstetrician:   Denae Rogers    Assistant: Lissette TEMPLE    Pre-Delivery Diagnosis: Term pregnancy, Induced labor or Single fetus    Post-Delivery Diagnosis: Same as above - Delivered or 1st degree laceration    Procedure: SAVD, laceration did not require repair     Episiotomy: none    Laceration: 1st degree not requiring repair    Estimated Blood Loss:  QBL done           Complications:  none    Details: Patient delivered a viable Female  over very small first degree laceration that was hemostatic and did not require repair  After delivery of the  with reduction of nuchal cord and delivery with shoulder cord and appropriate delay, the umbilical cord was doubly clamped and cut and the  was passed to staff for routine care  Umbilical cord blood and umbilical artery and venous gases were collected  Active management of the third stage of labor was undertaken with IV pitocin  Bleeding was noted to be under control  Placenta delivered intact with 3-v cord and trailing membranes  Inspection of the perineum revealed the above which did not require repair and showed good hemostasis  Uterus was firming well  Cleared of blood and clot  Mother and baby are currently recovering nicely in stable condition             Attending Attestation: I was present for the entire procedure

## 2020-07-08 NOTE — ANESTHESIA PREPROCEDURE EVALUATION
Review of Systems/Medical History  Patient summary reviewed  Chart reviewed  No history of anesthetic complications     Cardiovascular  Negative cardio ROS    Pulmonary  Negative pulmonary ROS        GI/Hepatic    GERD (with pregnancy) ,        Negative  ROS        Endo/Other  History of thyroid disease , hypothyroidism,      GYN  Currently pregnant ,          Hematology  Negative hematology ROS      Musculoskeletal  Negative musculoskeletal ROS        Neurology  Negative neurology ROS      Psychology   Negative psychology ROS              Physical Exam    Airway    Mallampati score: II  TM Distance: >3 FB  Neck ROM: full     Dental       Cardiovascular  Comment: Negative ROS,     Pulmonary      Other Findings        Anesthesia Plan  ASA Score- 2     Anesthesia Type- epidural with ASA Monitors  Additional Monitors:   Airway Plan:         Plan Factors-    Induction-     Postoperative Plan-     Informed Consent- Anesthetic plan and risks discussed with patient  I personally reviewed this patient with the CRNA  Discussed and agreed on the Anesthesia Plan with the CRNA  Andrews Alarcon

## 2020-07-09 PROCEDURE — 99024 POSTOP FOLLOW-UP VISIT: CPT | Performed by: OBSTETRICS & GYNECOLOGY

## 2020-07-09 RX ADMIN — IBUPROFEN 600 MG: 600 TABLET ORAL at 04:32

## 2020-07-09 NOTE — LACTATION NOTE
This note was copied from a baby's chart  CONSULT - LACTATION  Baby Girl Bob Kidney) Lear 1 days female MRN: [id number]    [facility] NURSERY Room / Bed:  304(N)/ 304(N) Encounter: [id number]    Maternal Information     MOTHER:  Deann Pugh  Maternal Age: 30 y o    OB History: #: 1, Date: 18, Sex: Female, Weight: 2745 g (6 lb 0 8 oz), GA: 38w6d, Delivery: Vaginal, Spontaneous, Apgar1: 9, Apgar5: 9, Living: Living, Birth Comments: None    #: 2, Date: 20, Sex: Female, Weight: 2977 g (6 lb 9 oz), GA: 40w0d, Delivery: Vaginal, Spontaneous, Apgar1: 9, Apgar5: 9, Living: Living, Birth Comments: None   Previouse breast reduction surgery? No    Lactation history:   Has patient previously breast fed: Yes   How long had patient previously breast fed: 6 months of pumping for 1st child  Previous breast feeding complications:       Past Surgical History:   Procedure Laterality Date    WISDOM TOOTH EXTRACTION         Birth information:  YOB: 2019   Time of birth: 6:22 PM   Sex: female   Delivery type: Vaginal, Spontaneous   Birth Weight: 2977 g (6 lb 9 oz)   Percent of Weight Change: 0%     Gestational Age: 37w0d   [unfilled]    Assessment     Breast and nipple assessment: normal assessment     Assessment: normal assessment    Feeding assessment: feeding well  LATCH:  Latch: Grasps breast, tongue down, lips flanged, rhythmic sucking   Audible Swallowing: Spontaneous and intermittent (24 hours old)   Type of Nipple: Everted (After stimulation)   Comfort (Breast/Nipple): Filling, red/small blisters/bruises, mild/moderate discomfort   Hold (Positioning): No assist from staff, mother able to position/hold infant   LATCH Score: 9          Feeding recommendations:  breast feed on demand     HE+  Tender nipples  Latched independently  Worked on positioning infant up at chest level and starting to feed infant with nose arriving at the nipple   Then, using areolar compression to achieve a deep latch that is comfortable and exchanges optimum amounts of milk  Discussed 2nd night syndrome and ways to calm infant  Hand out given  Information on hand expression given  Discussed benefits of knowing how to manually express breast including stimulating milk supply, softening nipple for latch and evacuating breast in the event of engorgement  Met with mother  Provided mother with Ready, Set, Baby booklet  Discussed Skin to Skin contact an benefits to mom and baby  Talked about the delay of the first bath until baby has adjusted  Spoke about the benefits of rooming in  Feeding on cue and what that means for recognizing infant's hunger  Avoidance of pacifiers for the first month discussed  Talked about exclusive breastfeeding for the first 6 months  Positioning and latch reviewed as well as showing images of other feeding positions  Discussed the properties of a good latch in any position  Reviewed hand/manual expression  Discussed s/s that baby is getting enough milk and some s/s that breastfeeding dyad may need further help  Gave information on common concerns, what to expect the first few weeks after delivery, preparing for other caregivers, and how partners can help  Resources for support also provided  Encouraged parents to call for assistance, questions, and concerns about breastfeeding  Extension provided    Asia Warren RN 7/9/2020 7:31 PM

## 2020-07-09 NOTE — PLAN OF CARE
Problem: POSTPARTUM  Goal: Experiences normal postpartum course  Description  INTERVENTIONS:  - Monitor maternal vital signs  - Assess uterine involution and lochia  Outcome: Progressing  Goal: Appropriate maternal -  bonding  Description  INTERVENTIONS:  - Identify family support  - Assess for appropriate maternal/infant bonding   -Encourage maternal/infant bonding opportunities  - Referral to  or  as needed  Outcome: Progressing  Goal: Establishment of infant feeding pattern  Description  INTERVENTIONS:  - Assess breast/bottle feeding  - Refer to lactation as needed  Outcome: Progressing  Goal: Incision(s), wounds(s) or drain site(s) healing without S/S of infection  Description  INTERVENTIONS  - Assess and document risk factors for skin impairment   - Assess and document dressing, incision, wound bed, drain sites and surrounding tissue  - Consider nutrition services referral as needed  - Oral mucous membranes remain intact  - Provide patient/ family education  Outcome: Progressing     Problem: DISCHARGE PLANNING - CARE MANAGEMENT  Goal: Discharge to post-acute care or home with appropriate resources  Description  INTERVENTIONS:  - Conduct assessment to determine patient/family and health care team treatment goals, and need for post-acute services based on payer coverage, community resources, and patient preferences, and barriers to discharge  - Address psychosocial, clinical, and financial barriers to discharge as identified in assessment in conjunction with the patient/family and health care team  - Arrange appropriate level of post-acute services according to patients   needs and preference and payer coverage in collaboration with the physician and health care team  - Communicate with and update the patient/family, physician, and health care team regarding progress on the discharge plan  - Arrange appropriate transportation to post-acute venues  Outcome: Progressing Problem: PAIN - ADULT  Goal: Verbalizes/displays adequate comfort level or baseline comfort level  Description  Interventions:  - Encourage patient to monitor pain and request assistance  - Assess pain using appropriate pain scale  - Administer analgesics based on type and severity of pain and evaluate response  - Implement non-pharmacological measures as appropriate and evaluate response  - Consider cultural and social influences on pain and pain management  - Notify physician/advanced practitioner if interventions unsuccessful or patient reports new pain  Outcome: Progressing     Problem: INFECTION - ADULT  Goal: Absence or prevention of progression during hospitalization  Description  INTERVENTIONS:  - Assess and monitor for signs and symptoms of infection  - Monitor lab/diagnostic results  - Monitor all insertion sites, i e  indwelling lines, tubes, and drains  - Monitor endotracheal if appropriate and nasal secretions for changes in amount and color  - [place] appropriate cooling/warming therapies per order  - Administer medications as ordered  - Instruct and encourage patient and family to use good hand hygiene technique  - Identify and instruct in appropriate isolation precautions for identified infection/condition  Outcome: Progressing  Goal: Absence of fever/infection during neutropenic period  Description  INTERVENTIONS:  - Monitor WBC    Outcome: Progressing     Problem: SAFETY ADULT  Goal: Patient will remain free of falls  Description  INTERVENTIONS:  - Assess patient frequently for physical needs  -  Identify cognitive and physical deficits and behaviors that affect risk of falls    -  [place] fall precautions as indicated by assessment   - Educate patient/family on patient safety including physical limitations  - Instruct patient to call for assistance with activity based on assessment  - Modify environment to reduce risk of injury  - Consider OT/PT consult to assist with strengthening/mobility  Outcome: Progressing  Goal: Maintain or return to baseline ADL function  Description  INTERVENTIONS:  -  Assess patient's ability to carry out ADLs; assess patient's baseline for ADL function and identify physical deficits which impact ability to perform ADLs (bathing, care of mouth/teeth, toileting, grooming, dressing, etc )  - Assess/evaluate cause of self-care deficits   - Assess range of motion  - Assess patient's mobility; develop plan if impaired  - Assess patient's need for assistive devices and provide as appropriate  - Encourage maximum independence but intervene and supervise when necessary  - Involve family in performance of ADLs  - Assess for home care needs following discharge   - Consider OT consult to assist with ADL evaluation and planning for discharge  - Provide patient education as appropriate  Outcome: Progressing  Goal: Maintain or return mobility status to optimal level  Description  INTERVENTIONS:  - Assess patient's baseline mobility status (ambulation, transfers, stairs, etc )    - Identify cognitive and physical deficits and behaviors that affect mobility  - Identify mobility aids required to assist with transfers and/or ambulation (gait belt, sit-to-stand, lift, walker, cane, etc )  - [place] fall precautions as indicated by assessment  - Record patient progress and toleration of activity level on Mobility SBAR; progress patient to next Phase/Stage  - Instruct patient to call for assistance with activity based on assessment  - Consider rehabilitation consult to assist with strengthening/weightbearing, etc   Outcome: Progressing     Problem: DISCHARGE PLANNING  Goal: Discharge to home or other facility with appropriate resources  Description  INTERVENTIONS:  - Identify barriers to discharge w/patient and caregiver  - Arrange for needed discharge resources and transportation as appropriate  - Identify discharge learning needs (meds, wound care, etc )  - Arrange for interpretive services to assist at discharge as needed  - Refer to Case Management Department for coordinating discharge planning if the patient needs post-hospital services based on physician/advanced practitioner order or complex needs related to functional status, cognitive ability, or social support system  Outcome: Progressing     Problem: ALTERATION IN THE BREAST  Goal: Optimize infant feeding at the breast  Description  INTERVENTIONS:  - Latch, breast and nipple assessment  - Assess prior breast feeding history  - Hand expression of breast milk  - For cracked, bleeding and or sore nipples reassess latch, treat damaged nipple  -Educate mother on feeding cues  -Positioning/latch techniques  Outcome: Progressing     Problem: INADEQUATE LATCH, SUCK OR SWALLOW  Goal: Demonstrate ability to latch and sustain latch, audible swallowing and satiety  Description  INTERVENTIONS:  - Assess oral anatomy, notify Physician/AP for abnormal findings  - Establish milk expression  - Maximize feeding opportunity (skin to skin, behavioral state)  - Position/latch techniques  - Discourage use of pacifier-artificial nipple  - Mechanical pumping  - Nipple Shield  - Supplemental formula feeding (Physician/AP order)  - Alternative feeding method  Outcome: Progressing

## 2020-07-09 NOTE — DISCHARGE INSTRUCTIONS
Vaginal Delivery   WHAT YOU SHOULD KNOW:   A vaginal delivery is the birth of your baby through your vagina (birth canal)  AFTER YOU LEAVE:   Medicines:  · NSAIDs  help decrease swelling and pain or fever  This medicine is available with or without a doctor's order  NSAIDs can cause stomach bleeding or kidney problems in certain people  If you take blood thinner medicine, always ask your healthcare provider if NSAIDs are safe for you  Always read the medicine label and follow directions  · Take your medicine as directed  Call your healthcare provider if you think your medicine is not helping or if you have side effects  Tell him if you are allergic to any medicine  Keep a list of the medicines, vitamins, and herbs you take  Include the amounts, and when and why you take them  Bring the list or the pill bottles to follow-up visits  Carry your medicine list with you in case of an emergency  Follow up with your primary healthcare provider:  Most women need to return 6 weeks after a vaginal delivery  Ask about how to care for your wounds or stitches  Write down your questions so you remember to ask them during your visits  Activity:  Rest as much as possible  Try to keep all activities short  You may be able to do some exercise soon after you have your baby  Talk with your primary healthcare provider before you start exercising  If you work outside the home, ask when you can return to your job  Kegel exercises:  Kegel exercises may help your vaginal and rectal muscles heal faster  You can do Kegel exercises by tightening and relaxing the muscles around your vagina  Kegel exercises help make the muscles stronger  Breast care:  When your milk comes in, your breasts may feel full and hard  Ask how to care for your breasts, even if you are not breastfeeding  Constipation:  Do not try to push the bowel movement out if it is too hard   High-fiber foods, extra liquids, and regular exercise can help you prevent constipation  Examples of high-fiber foods are fruit and bran  Prune juice and water are good liquids to drink  Regular exercise helps your digestive system work  You may also be told to take over-the-counter fiber and stool softener medicines  Take these items as directed  Hemorrhoids:  Pregnancy can cause severe hemorrhoids  You may have rectal pain because of the hemorrhoids  Ask how to prevent or treat hemorrhoids  Perineum care: Your perineum is the area between your vagina and anus  Keep the area clean and dry to help it heal and to prevent infection  Wash the area gently with soap and water when you bathe or shower  Rinse your perineum with warm water when you use the toilet  Your primary healthcare provider may suggest you use a warm sitz bath to help decrease pain  A sitz bath is a bathtub or basin filled to hip level  Stay in the sitz bath for 20 to 30 minutes, or as directed  Vaginal discharge: You will have vaginal discharge, called lochia, after your delivery  The lochia is bright red the first day or two after the birth  By the fourth day, the amount decreases, and it turns red-brown  Use a sanitary pad rather than a tampon to prevent a vaginal infection  It is normal to have lochia up to 8 weeks after your baby is born  Monthly periods: Your period may start again within 7 to 12 weeks after your baby is born  If you are breastfeeding, it may take longer for your period to start again  You can still get pregnant again even though you do not have your monthly period  Talk with your primary healthcare provider about a birth control method that will be good for you if you do not want to get pregnant  Mood changes: Many new mothers have some kind of mood changes after delivery  Some of these changes occur because of lack of sleep, hormone changes, and caring for a new baby  Some mood changes can be more serious, such as postpartum depression   Talk with your primary healthcare provider if you feel unable to care for yourself or your baby  Sexual activity:  You may need to avoid sex for 6 to 7 weeks after you have your baby  You may notice you have a decreased desire for sex, or sex may be painful  You may need to use a vaginal lubricant (gel) to help make sex more comfortable  Contact your primary healthcare provider if:   · You have heavy vaginal bleeding that fills 1 or more sanitary pads in 1 hour  · You have a fever  · Your pain does not go away, or gets worse  · The skin between your vagina and rectum is swollen, warm, or red  · You have swollen, hard, or painful breasts  · You feel very sad or depressed  · You feel more tired than usual      · You have questions or concerns about your condition or care  Seek care immediately or call 911 if:   · You have pus or yellow drainage coming from your vagina or wound  · You are urinating very little, or not at all  · Your arm or leg feels warm, tender, and painful  It may look swollen and red  · You feel lightheaded, have sudden and worsening chest pain, or trouble breathing  You may have more pain when you take deep breaths or cough, or you may cough up blood  © 2014 [address] is for End User's use only and may not be sold, redistributed or otherwise used for commercial purposes  All illustrations and images included in CareNotes® are the copyrighted property of "[employer]  or Hernesto Loredo  The above information is an  only  It is not intended as medical advice for individual conditions or treatments  Talk to your doctor, nurse or pharmacist before following any medical regimen to see if it is safe and effective for you

## 2020-07-09 NOTE — ANESTHESIA POSTPROCEDURE EVALUATION
Post-Op Assessment Note    CV Status:  Stable  Pain Score: 0    Pain management: adequate     Mental Status:  Alert   Hydration Status:  Stable   PONV Controlled:  None   Airway Patency:  Patent   Post Op Vitals Reviewed: Yes      Staff: Anesthesiologist         Partial  Weakness in l lower Extremity presently, but dissipating    BP      Temp      Pulse     Resp      SpO2

## 2020-07-09 NOTE — PROGRESS NOTES
POSTPARTUM NOTE  Tere Lindsay 30 y o  female MRN: [id number]  Unit/Bed#: -01 Encounter: [id number]    Date: 07/09/20  Procedure: Spontaneous Vaginal Delivery  Postpartum/Postop Day #: 1     SUBJECTIVE: Pt seen and examined at bed  Has no new concerns  Feels ready to go home later in the evening  Pain: yes, cramping, resolution with oral analgesics  Tolerating Oral Intake: yes   Voiding: yes  Flatus: yes  Bowel Movement: no  Ambulating: yes  Breastfeeding: yes; supplemental formula feeding    Chest Pain: no  Shortness of Breath: no  Leg Pain/Discomfort: no  Lochia: minimal    OBJECTIVE:   Vitals: Temp:  [97 °F (36 1 °C)-98 2 °F (36 8 °C)] 98 2 °F (36 8 °C)  HR:  [50-82] 64  Resp:  [16-18] 16  BP: ()/(54-82) 117/57  General: No Acute Distress   Cardiovascular: Regular, Rate and Rhythm, no murmur, normal S1/S2   Lungs: Clear to Auscultation Bilaterally, no wheezing, non-labored breathing   Abdomen: Soft, non-distended, non-tender, no rebound, guarding or CVA tenderness   Fundus: Firm & Non-Tender, Fundal Location: -1 cm below the umbilicus  Lower Extremities: Non-tender, Edema Minimal    LABS / TESTS / MEDICATION:    Recent Results (from the past 72 hour(s))   Strep B DNA probe, amplification    Collection Time: 07/08/20 12:00 AM   Result Value Ref Range    Strep Group B Ag Positive (A) Negative, Unknown, None, Pending   CBC and differential    Collection Time: 07/08/20  8:46 AM   Result Value Ref Range    WBC 7 51 4 31 - 10 16 Thousand/uL    RBC 4 71 3 81 - 5 12 Million/uL    Hemoglobin 11 0 (L) 11 5 - 15 4 g/dL    Hematocrit 36 6 34 8 - 46 1 %    MCV 78 (L) 82 - 98 fL    MCH 23 4 (L) 26 8 - 34 3 pg    MCHC 30 1 (L) 31 4 - 37 4 g/dL    RDW 17 3 (H) 11 6 - 15 1 %    MPV 11 6 8 9 - 12 7 fL    Platelets 934 876 - 018 Thousands/uL    nRBC 0 /100 WBCs    Neutrophils Relative 71 43 - 75 %    Immat GRANS % 1 0 - 2 %    Lymphocytes Relative 19 14 - 44 %    Monocytes Relative 7 4 - 12 %    Eosinophils Relative 2 0 - 6 %    Basophils Relative 0 0 - 1 %    Neutrophils Absolute 5 36 1 85 - 7 62 Thousands/µL    Immature Grans Absolute 0 04 0 00 - 0 20 Thousand/uL    Lymphocytes Absolute 1 44 0 60 - 4 47 Thousands/µL    Monocytes Absolute 0 53 0 17 - 1 22 Thousand/µL    Eosinophils Absolute 0 11 0 00 - 0 61 Thousand/µL    Basophils Absolute 0 03 0 00 - 0 10 Thousands/µL   Type and screen    Collection Time: 07/08/20  8:46 AM   Result Value Ref Range    ABO Grouping O     Rh Factor Positive     Antibody Screen Negative     Specimen Expiration Date 20200711    RPR    Collection Time: 07/08/20  8:46 AM   Result Value Ref Range    RPR Non-Reactive Non-Reactive   Blood gas, arterial, cord    Collection Time: 07/08/20  6:25 PM   Result Value Ref Range    pH, Cord Art 7 279 7 230 - 7 430    pCO2, Cord Art 58 7 30 0 - 60 0    pO2, Cord Art 15 5 5 0 - 25 0 mm HG    HCO3, Cord Art 26 9 17 3 - 27 3 mmol/L    Base Exc, Cord Art -1 3 (L) 3 0 - 11 0 mmol/L    O2 Content, Cord Art 6 3 ml/dl    O2 Hgb, Arterial Cord 27 6 %   Blood gas, venous, cord    Collection Time: 07/08/20  6:25 PM   Result Value Ref Range    pH, Cord Bernard 7 420 7 190 - 7 490    pCO2, Cord Bernard 32 7 27 0 - 43 0 mm HG    pO2, Cord Bernard 38 1 15 0 - 45 0 mm HG    HCO3, Cord Bernard 20 7 12 2 - 28 6 mmol/L    Base Exc, Cord Bernard -2 7 (L) 1 0 - 9 0 mmol/L    O2 Cont, Cord Bernard 18 2 mL/dL    O2 HGB,VENOUS CORD 81 0 %            acetaminophen 650 mg Q6H PRN   aluminum-magnesium hydroxide-simethicone 15 mL Q6H PRN   benzocaine-menthol-lanolin-aloe  4x Daily PRN   diphenhydrAMINE 25 mg Q6H PRN   docusate sodium 100 mg BID PRN   hydrocortisone 1 application 4x Daily PRN   ibuprofen 600 mg Q6H PRN   witch hazel-glycerin 1 pad Q2H PRN       ASSESSMENT:   30 y o  [id number] s/p Spontaneous Vaginal Delivery Postpartum day  1  PLAN:  1) Delivery: Continue routine postpartum care, encourage ambulation, advance diet as tolerated  2) Anticipate discharge 7/9  Aware baby's labs might be pending     3) [facility] with [facility] in 3 weeks       Signature / Title: Lucila Fregoso DO, Family Medicine  Date: 7/9/2020  Time: 6:46 AM

## 2020-07-10 VITALS
HEART RATE: 75 BPM | TEMPERATURE: 98.2 F | HEIGHT: 64 IN | SYSTOLIC BLOOD PRESSURE: 123 MMHG | OXYGEN SATURATION: 94 % | RESPIRATION RATE: 18 BRPM | BODY MASS INDEX: 26.98 KG/M2 | DIASTOLIC BLOOD PRESSURE: 67 MMHG | WEIGHT: 158 LBS

## 2020-07-10 PROBLEM — O44.42 LOW LYING PLACENTA NOS OR WITHOUT HEMORRHAGE, SECOND TRIMESTER: Status: RESOLVED | Noted: 2020-02-20 | Resolved: 2020-07-10

## 2020-07-10 PROBLEM — Z3A.32 32 WEEKS GESTATION OF PREGNANCY: Status: RESOLVED | Noted: 2020-02-20 | Resolved: 2020-07-10

## 2020-07-10 PROBLEM — Z34.83 PRENATAL CARE, SUBSEQUENT PREGNANCY, THIRD TRIMESTER: Status: RESOLVED | Noted: 2020-06-25 | Resolved: 2020-07-10

## 2020-07-10 PROCEDURE — 99024 POSTOP FOLLOW-UP VISIT: CPT | Performed by: OBSTETRICS & GYNECOLOGY

## 2020-07-10 RX ORDER — ACETAMINOPHEN 325 MG/1
650 TABLET ORAL EVERY 6 HOURS PRN
Qty: 30 TABLET | Refills: 0
Start: 2020-07-10 | End: 2020-07-27

## 2020-07-10 RX ORDER — IBUPROFEN 600 MG/1
600 TABLET ORAL EVERY 6 HOURS PRN
Qty: 30 TABLET | Refills: 0
Start: 2020-07-10 | End: 2020-07-27

## 2020-07-10 RX ORDER — DIAPER,BRIEF,INFANT-TODD,DISP
1 EACH MISCELLANEOUS 4 TIMES DAILY PRN
Qty: 30 G | Refills: 0
Start: 2020-07-10 | End: 2020-07-27

## 2020-07-10 RX ADMIN — IBUPROFEN 600 MG: 600 TABLET ORAL at 08:42

## 2020-07-10 NOTE — PLAN OF CARE
Problem: POSTPARTUM  Goal: Experiences normal postpartum course  Description  INTERVENTIONS:  - Monitor maternal vital signs  - Assess uterine involution and lochia  Outcome: Progressing  Goal: Appropriate maternal -  bonding  Description  INTERVENTIONS:  - Identify family support  - Assess for appropriate maternal/infant bonding   -Encourage maternal/infant bonding opportunities  - Referral to  or  as needed  Outcome: Progressing  Goal: Establishment of infant feeding pattern  Description  INTERVENTIONS:  - Assess breast/bottle feeding  - Refer to lactation as needed  Outcome: Progressing  Goal: Incision(s), wounds(s) or drain site(s) healing without S/S of infection  Description  INTERVENTIONS  - Assess and document risk factors for skin impairment   - Assess and document dressing, incision, wound bed, drain sites and surrounding tissue  - Consider nutrition services referral as needed  - Oral mucous membranes remain intact  - Provide patient/ family education  Outcome: Progressing     Problem: DISCHARGE PLANNING - CARE MANAGEMENT  Goal: Discharge to post-acute care or home with appropriate resources  Description  INTERVENTIONS:  - Conduct assessment to determine patient/family and health care team treatment goals, and need for post-acute services based on payer coverage, community resources, and patient preferences, and barriers to discharge  - Address psychosocial, clinical, and financial barriers to discharge as identified in assessment in conjunction with the patient/family and health care team  - Arrange appropriate level of post-acute services according to patients   needs and preference and payer coverage in collaboration with the physician and health care team  - Communicate with and update the patient/family, physician, and health care team regarding progress on the discharge plan  - Arrange appropriate transportation to post-acute venues  Outcome: Progressing Problem: PAIN - ADULT  Goal: Verbalizes/displays adequate comfort level or baseline comfort level  Description  Interventions:  - Encourage patient to monitor pain and request assistance  - Assess pain using appropriate pain scale  - Administer analgesics based on type and severity of pain and evaluate response  - Implement non-pharmacological measures as appropriate and evaluate response  - Consider cultural and social influences on pain and pain management  - Notify physician/advanced practitioner if interventions unsuccessful or patient reports new pain  Outcome: Progressing     Problem: INFECTION - ADULT  Goal: Absence or prevention of progression during hospitalization  Description  INTERVENTIONS:  - Assess and monitor for signs and symptoms of infection  - Monitor lab/diagnostic results  - Monitor all insertion sites, i e  indwelling lines, tubes, and drains  - Monitor endotracheal if appropriate and nasal secretions for changes in amount and color  - [place] appropriate cooling/warming therapies per order  - Administer medications as ordered  - Instruct and encourage patient and family to use good hand hygiene technique  - Identify and instruct in appropriate isolation precautions for identified infection/condition  Outcome: Progressing  Goal: Absence of fever/infection during neutropenic period  Description  INTERVENTIONS:  - Monitor WBC    Outcome: Progressing     Problem: SAFETY ADULT  Goal: Patient will remain free of falls  Description  INTERVENTIONS:  - Assess patient frequently for physical needs  -  Identify cognitive and physical deficits and behaviors that affect risk of falls    -  [place] fall precautions as indicated by assessment   - Educate patient/family on patient safety including physical limitations  - Instruct patient to call for assistance with activity based on assessment  - Modify environment to reduce risk of injury  - Consider OT/PT consult to assist with strengthening/mobility  Outcome: Progressing  Goal: Maintain or return to baseline ADL function  Description  INTERVENTIONS:  -  Assess patient's ability to carry out ADLs; assess patient's baseline for ADL function and identify physical deficits which impact ability to perform ADLs (bathing, care of mouth/teeth, toileting, grooming, dressing, etc )  - Assess/evaluate cause of self-care deficits   - Assess range of motion  - Assess patient's mobility; develop plan if impaired  - Assess patient's need for assistive devices and provide as appropriate  - Encourage maximum independence but intervene and supervise when necessary  - Involve family in performance of ADLs  - Assess for home care needs following discharge   - Consider OT consult to assist with ADL evaluation and planning for discharge  - Provide patient education as appropriate  Outcome: Progressing  Goal: Maintain or return mobility status to optimal level  Description  INTERVENTIONS:  - Assess patient's baseline mobility status (ambulation, transfers, stairs, etc )    - Identify cognitive and physical deficits and behaviors that affect mobility  - Identify mobility aids required to assist with transfers and/or ambulation (gait belt, sit-to-stand, lift, walker, cane, etc )  - [place] fall precautions as indicated by assessment  - Record patient progress and toleration of activity level on Mobility SBAR; progress patient to next Phase/Stage  - Instruct patient to call for assistance with activity based on assessment  - Consider rehabilitation consult to assist with strengthening/weightbearing, etc   Outcome: Progressing     Problem: DISCHARGE PLANNING  Goal: Discharge to home or other facility with appropriate resources  Description  INTERVENTIONS:  - Identify barriers to discharge w/patient and caregiver  - Arrange for needed discharge resources and transportation as appropriate  - Identify discharge learning needs (meds, wound care, etc )  - Arrange for interpretive services to assist at discharge as needed  - Refer to Case Management Department for coordinating discharge planning if the patient needs post-hospital services based on physician/advanced practitioner order or complex needs related to functional status, cognitive ability, or social support system  Outcome: Progressing     Problem: ALTERATION IN THE BREAST  Goal: Optimize infant feeding at the breast  Description  INTERVENTIONS:  - Latch, breast and nipple assessment  - Assess prior breast feeding history  - Hand expression of breast milk  - For cracked, bleeding and or sore nipples reassess latch, treat damaged nipple  -Educate mother on feeding cues  -Positioning/latch techniques  Outcome: Progressing     Problem: INADEQUATE LATCH, SUCK OR SWALLOW  Goal: Demonstrate ability to latch and sustain latch, audible swallowing and satiety  Description  INTERVENTIONS:  - Assess oral anatomy, notify Physician/AP for abnormal findings  - Establish milk expression  - Maximize feeding opportunity (skin to skin, behavioral state)  - Position/latch techniques  - Discourage use of pacifier-artificial nipple  - Mechanical pumping  - Nipple Shield  - Supplemental formula feeding (Physician/AP order)  - Alternative feeding method  Outcome: Progressing

## 2020-07-10 NOTE — QUICK NOTE
Reviewed PPD score of 10 with patient  Denies any issues - reviewed when to call with patient and   Denies any desire to hurt self of baby  Follow up as needed

## 2020-07-10 NOTE — PROGRESS NOTES
POSTPARTUM NOTE  Ismael Jhaveri 30 y o  female MRN: [id number]  Unit/Bed#: -01 Encounter: [id number]    Date: 07/10/20  Procedure: Spontaneous Vaginal Delivery  Postpartum/Postop Day #: 2     SUBJECTIVE: Seen and examined at bedside  Ready to be discharged today  Baby's discharge status pending appropriate urine output  Pain: yes  Cramping responsive to oral analgesics  Has bilateral hip discomfort     Tolerating Oral Intake: yes   Voiding: yes  Flatus: yes  Bowel Movement: no  Ambulating: yes  Breastfeeding: yes  Chest Pain: no  Shortness of Breath: no  Leg Pain/Discomfort: no  Lochia: minimal    OBJECTIVE:   Vitals: Temp:  [98 1 °F (36 7 °C)-98 4 °F (36 9 °C)] 98 2 °F (36 8 °C)  HR:  [65-81] 81  Resp:  [18-20] 18  BP: (111-121)/(64-76) 121/76  General: No Acute Distress   Cardiovascular: Regular, Rate and Rhythm, no murmur, normal S1/S2   Lungs: Clear to Auscultation Bilaterally, no wheezing, non-labored breathing   Abdomen: Soft, non-distended, non-tender, no rebound, guarding or CVA tenderness   Fundus: Firm & Non-Tender, Fundal Location: -3 cm below the umbilicus  Lower Extremities: Non-tender, Edema Minimal    LABS / TESTS / MEDICATION:    Recent Results (from the past 72 hour(s))   Strep B DNA probe, amplification    Collection Time: 07/08/20 12:00 AM   Result Value Ref Range    Strep Group B Ag Positive (A) Negative, Unknown, None, Pending   CBC and differential    Collection Time: 07/08/20  8:46 AM   Result Value Ref Range    WBC 7 51 4 31 - 10 16 Thousand/uL    RBC 4 71 3 81 - 5 12 Million/uL    Hemoglobin 11 0 (L) 11 5 - 15 4 g/dL    Hematocrit 36 6 34 8 - 46 1 %    MCV 78 (L) 82 - 98 fL    MCH 23 4 (L) 26 8 - 34 3 pg    MCHC 30 1 (L) 31 4 - 37 4 g/dL    RDW 17 3 (H) 11 6 - 15 1 %    MPV 11 6 8 9 - 12 7 fL    Platelets 758 950 - 358 Thousands/uL    nRBC 0 /100 WBCs    Neutrophils Relative 71 43 - 75 %    Immat GRANS % 1 0 - 2 %    Lymphocytes Relative 19 14 - 44 %    Monocytes Relative 7 4 - 12 % Eosinophils Relative 2 0 - 6 %    Basophils Relative 0 0 - 1 %    Neutrophils Absolute 5 36 1 85 - 7 62 Thousands/µL    Immature Grans Absolute 0 04 0 00 - 0 20 Thousand/uL    Lymphocytes Absolute 1 44 0 60 - 4 47 Thousands/µL    Monocytes Absolute 0 53 0 17 - 1 22 Thousand/µL    Eosinophils Absolute 0 11 0 00 - 0 61 Thousand/µL    Basophils Absolute 0 03 0 00 - 0 10 Thousands/µL   Type and screen    Collection Time: 07/08/20  8:46 AM   Result Value Ref Range    ABO Grouping O     Rh Factor Positive     Antibody Screen Negative     Specimen Expiration Date 20200711    RPR    Collection Time: 07/08/20  8:46 AM   Result Value Ref Range    RPR Non-Reactive Non-Reactive   Blood gas, arterial, cord    Collection Time: 07/08/20  6:25 PM   Result Value Ref Range    pH, Cord Art 7 279 7 230 - 7 430    pCO2, Cord Art 58 7 30 0 - 60 0    pO2, Cord Art 15 5 5 0 - 25 0 mm HG    HCO3, Cord Art 26 9 17 3 - 27 3 mmol/L    Base Exc, Cord Art -1 3 (L) 3 0 - 11 0 mmol/L    O2 Content, Cord Art 6 3 ml/dl    O2 Hgb, Arterial Cord 27 6 %   Blood gas, venous, cord    Collection Time: 07/08/20  6:25 PM   Result Value Ref Range    pH, Cord Bernard 7 420 7 190 - 7 490    pCO2, Cord Bernard 32 7 27 0 - 43 0 mm HG    pO2, Cord Bernard 38 1 15 0 - 45 0 mm HG    HCO3, Cord Bernard 20 7 12 2 - 28 6 mmol/L    Base Exc, Cord Bernard -2 7 (L) 1 0 - 9 0 mmol/L    O2 Cont, Cord Bernard 18 2 mL/dL    O2 HGB,VENOUS CORD 81 0 %            acetaminophen 650 mg Q6H PRN   aluminum-magnesium hydroxide-simethicone 15 mL Q6H PRN   benzocaine-menthol-lanolin-aloe  4x Daily PRN   diphenhydrAMINE 25 mg Q6H PRN   docusate sodium 100 mg BID PRN   hydrocortisone 1 application 4x Daily PRN   ibuprofen 600 mg Q6H PRN   witch hazel-glycerin 1 pad Q2H PRN       ASSESSMENT:   30 y o  [id number] s/p Spontaneous Vaginal Delivery Postpartum day  2  PLAN:  1) Delivery: Continue routine postpartum care, encourage ambulation, advance diet as tolerated  2) Anticipate discharge 7/10   Baby's discharge TBD pending appropriate urine output  3) FU with [facility] in 3 weeks  4) Holding off contraception decision till postpartum check  Plans abstinence method meanwhile       Signature / Title: Mirza David, DO, Family Medicine  Date: 7/10/2020  Time: 6:35 AM

## 2020-07-10 NOTE — PLAN OF CARE
Problem: POSTPARTUM  Goal: Experiences normal postpartum course  Description  INTERVENTIONS:  - Monitor maternal vital signs  - Assess uterine involution and lochia  Outcome: Progressing  Goal: Appropriate maternal -  bonding  Description  INTERVENTIONS:  - Identify family support  - Assess for appropriate maternal/infant bonding   -Encourage maternal/infant bonding opportunities  - Referral to  or  as needed  Outcome: Progressing  Goal: Establishment of infant feeding pattern  Description  INTERVENTIONS:  - Assess breast/bottle feeding  - Refer to lactation as needed  Outcome: Progressing  Goal: Incision(s), wounds(s) or drain site(s) healing without S/S of infection  Description  INTERVENTIONS  - Assess and document risk factors for skin impairment   - Assess and document dressing, incision, wound bed, drain sites and surrounding tissue  - Consider nutrition services referral as needed  - Oral mucous membranes remain intact  - Provide patient/ family education  Outcome: Progressing     Problem: DISCHARGE PLANNING - CARE MANAGEMENT  Goal: Discharge to post-acute care or home with appropriate resources  Description  INTERVENTIONS:  - Conduct assessment to determine patient/family and health care team treatment goals, and need for post-acute services based on payer coverage, community resources, and patient preferences, and barriers to discharge  - Address psychosocial, clinical, and financial barriers to discharge as identified in assessment in conjunction with the patient/family and health care team  - Arrange appropriate level of post-acute services according to patients   needs and preference and payer coverage in collaboration with the physician and health care team  - Communicate with and update the patient/family, physician, and health care team regarding progress on the discharge plan  - Arrange appropriate transportation to post-acute venues  Outcome: Progressing Problem: PAIN - ADULT  Goal: Verbalizes/displays adequate comfort level or baseline comfort level  Description  Interventions:  - Encourage patient to monitor pain and request assistance  - Assess pain using appropriate pain scale  - Administer analgesics based on type and severity of pain and evaluate response  - Implement non-pharmacological measures as appropriate and evaluate response  - Consider cultural and social influences on pain and pain management  - Notify physician/advanced practitioner if interventions unsuccessful or patient reports new pain  Outcome: Progressing     Problem: INFECTION - ADULT  Goal: Absence or prevention of progression during hospitalization  Description  INTERVENTIONS:  - Assess and monitor for signs and symptoms of infection  - Monitor lab/diagnostic results  - Monitor all insertion sites, i e  indwelling lines, tubes, and drains  - Monitor endotracheal if appropriate and nasal secretions for changes in amount and color  - [place] appropriate cooling/warming therapies per order  - Administer medications as ordered  - Instruct and encourage patient and family to use good hand hygiene technique  - Identify and instruct in appropriate isolation precautions for identified infection/condition  Outcome: Progressing  Goal: Absence of fever/infection during neutropenic period  Description  INTERVENTIONS:  - Monitor WBC    Outcome: Progressing     Problem: SAFETY ADULT  Goal: Patient will remain free of falls  Description  INTERVENTIONS:  - Assess patient frequently for physical needs  -  Identify cognitive and physical deficits and behaviors that affect risk of falls    -  [place] fall precautions as indicated by assessment   - Educate patient/family on patient safety including physical limitations  - Instruct patient to call for assistance with activity based on assessment  - Modify environment to reduce risk of injury  - Consider OT/PT consult to assist with strengthening/mobility  Outcome: Progressing  Goal: Maintain or return to baseline ADL function  Description  INTERVENTIONS:  -  Assess patient's ability to carry out ADLs; assess patient's baseline for ADL function and identify physical deficits which impact ability to perform ADLs (bathing, care of mouth/teeth, toileting, grooming, dressing, etc )  - Assess/evaluate cause of self-care deficits   - Assess range of motion  - Assess patient's mobility; develop plan if impaired  - Assess patient's need for assistive devices and provide as appropriate  - Encourage maximum independence but intervene and supervise when necessary  - Involve family in performance of ADLs  - Assess for home care needs following discharge   - Consider OT consult to assist with ADL evaluation and planning for discharge  - Provide patient education as appropriate  Outcome: Progressing  Goal: Maintain or return mobility status to optimal level  Description  INTERVENTIONS:  - Assess patient's baseline mobility status (ambulation, transfers, stairs, etc )    - Identify cognitive and physical deficits and behaviors that affect mobility  - Identify mobility aids required to assist with transfers and/or ambulation (gait belt, sit-to-stand, lift, walker, cane, etc )  - [place] fall precautions as indicated by assessment  - Record patient progress and toleration of activity level on Mobility SBAR; progress patient to next Phase/Stage  - Instruct patient to call for assistance with activity based on assessment  - Consider rehabilitation consult to assist with strengthening/weightbearing, etc   Outcome: Progressing     Problem: DISCHARGE PLANNING  Goal: Discharge to home or other facility with appropriate resources  Description  INTERVENTIONS:  - Identify barriers to discharge w/patient and caregiver  - Arrange for needed discharge resources and transportation as appropriate  - Identify discharge learning needs (meds, wound care, etc )  - Arrange for interpretive services to assist at discharge as needed  - Refer to Case Management Department for coordinating discharge planning if the patient needs post-hospital services based on physician/advanced practitioner order or complex needs related to functional status, cognitive ability, or social support system  Outcome: Progressing     Problem: ALTERATION IN THE BREAST  Goal: Optimize infant feeding at the breast  Description  INTERVENTIONS:  - Latch, breast and nipple assessment  - Assess prior breast feeding history  - Hand expression of breast milk  - For cracked, bleeding and or sore nipples reassess latch, treat damaged nipple  -Educate mother on feeding cues  -Positioning/latch techniques  Outcome: Progressing     Problem: INADEQUATE LATCH, SUCK OR SWALLOW  Goal: Demonstrate ability to latch and sustain latch, audible swallowing and satiety  Description  INTERVENTIONS:  - Assess oral anatomy, notify Physician/AP for abnormal findings  - Establish milk expression  - Maximize feeding opportunity (skin to skin, behavioral state)  - Position/latch techniques  - Discourage use of pacifier-artificial nipple  - Mechanical pumping  - Nipple Shield  - Supplemental formula feeding (Physician/AP order)  - Alternative feeding method  Outcome: Progressing

## 2020-07-10 NOTE — PLAN OF CARE
Problem: POSTPARTUM  Goal: Experiences normal postpartum course  Description  INTERVENTIONS:  - Monitor maternal vital signs  - Assess uterine involution and lochia  7/10/2020 1134 by Jace Laboy RN  Outcome: Adequate for Discharge  7/10/2020 1109 by Jace Laboy RN  Outcome: Progressing  Goal: Appropriate maternal -  bonding  Description  INTERVENTIONS:  - Identify family support  - Assess for appropriate maternal/infant bonding   -Encourage maternal/infant bonding opportunities  - Referral to  or  as needed  7/10/2020 1134 by Jace Laboy RN  Outcome: Adequate for Discharge  7/10/2020 1109 by Jace Laboy RN  Outcome: Progressing  Goal: Establishment of infant feeding pattern  Description  INTERVENTIONS:  - Assess breast/bottle feeding  - Refer to lactation as needed  7/10/2020 1134 by Jace Laboy RN  Outcome: Adequate for Discharge  7/10/2020 1109 by Jace Laboy RN  Outcome: Progressing  Goal: Incision(s), wounds(s) or drain site(s) healing without S/S of infection  Description  INTERVENTIONS  - Assess and document risk factors for skin impairment   - Assess and document dressing, incision, wound bed, drain sites and surrounding tissue  - Consider nutrition services referral as needed  - Oral mucous membranes remain intact  - Provide patient/ family education  7/10/2020 1134 by Jace Laboy RN  Outcome: Adequate for Discharge  7/10/2020 110 by Jace Laboy RN  Outcome: Progressing     Problem: DISCHARGE PLANNING - CARE MANAGEMENT  Goal: Discharge to post-acute care or home with appropriate resources  Description  INTERVENTIONS:  - Conduct assessment to determine patient/family and health care team treatment goals, and need for post-acute services based on payer coverage, community resources, and patient preferences, and barriers to discharge  - Address psychosocial, clinical, and financial barriers to discharge as identified in assessment in conjunction with the patient/family and health care team  - Arrange appropriate level of post-acute services according to patients   needs and preference and payer coverage in collaboration with the physician and health care team  - Communicate with and update the patient/family, physician, and health care team regarding progress on the discharge plan  - Arrange appropriate transportation to post-acute venues  7/10/2020 1134 by Tom Meyers RN  Outcome: Adequate for Discharge  7/10/2020 1109 by Tom Meyers RN  Outcome: Progressing     Problem: PAIN - ADULT  Goal: Verbalizes/displays adequate comfort level or baseline comfort level  Description  Interventions:  - Encourage patient to monitor pain and request assistance  - Assess pain using appropriate pain scale  - Administer analgesics based on type and severity of pain and evaluate response  - Implement non-pharmacological measures as appropriate and evaluate response  - Consider cultural and social influences on pain and pain management  - Notify physician/advanced practitioner if interventions unsuccessful or patient reports new pain  7/10/2020 1134 by Tom Meyers RN  Outcome: Adequate for Discharge  7/10/2020 1109 by Tom Meyers RN  Outcome: Progressing     Problem: INFECTION - ADULT  Goal: Absence or prevention of progression during hospitalization  Description  INTERVENTIONS:  - Assess and monitor for signs and symptoms of infection  - Monitor lab/diagnostic results  - Monitor all insertion sites, i e  indwelling lines, tubes, and drains  - Monitor endotracheal if appropriate and nasal secretions for changes in amount and color  - [place] appropriate cooling/warming therapies per order  - Administer medications as ordered  - Instruct and encourage patient and family to use good hand hygiene technique  - Identify and instruct in appropriate isolation precautions for identified infection/condition  7/10/2020 1134 by Tom Meyers RN  Outcome: Adequate for Discharge  7/10/2020 1109 by Khanh Harden RN  Outcome: Progressing  Goal: Absence of fever/infection during neutropenic period  Description  INTERVENTIONS:  - Monitor WBC    7/10/2020 1134 by Khanh Harden RN  Outcome: Adequate for Discharge  7/10/2020 1109 by Khanh Harden RN  Outcome: Progressing     Problem: SAFETY ADULT  Goal: Patient will remain free of falls  Description  INTERVENTIONS:  - Assess patient frequently for physical needs  -  Identify cognitive and physical deficits and behaviors that affect risk of falls    -  [place] fall precautions as indicated by assessment   - Educate patient/family on patient safety including physical limitations  - Instruct patient to call for assistance with activity based on assessment  - Modify environment to reduce risk of injury  - Consider OT/PT consult to assist with strengthening/mobility  7/10/2020 1134 by Khanh Harden RN  Outcome: Adequate for Discharge  7/10/2020 1109 by Khanh Harden RN  Outcome: Progressing  Goal: Maintain or return to baseline ADL function  Description  INTERVENTIONS:  -  Assess patient's ability to carry out ADLs; assess patient's baseline for ADL function and identify physical deficits which impact ability to perform ADLs (bathing, care of mouth/teeth, toileting, grooming, dressing, etc )  - Assess/evaluate cause of self-care deficits   - Assess range of motion  - Assess patient's mobility; develop plan if impaired  - Assess patient's need for assistive devices and provide as appropriate  - Encourage maximum independence but intervene and supervise when necessary  - Involve family in performance of ADLs  - Assess for home care needs following discharge   - Consider OT consult to assist with ADL evaluation and planning for discharge  - Provide patient education as appropriate  7/10/2020 1134 by Khanh Harden RN  Outcome: Adequate for Discharge  7/10/2020 1109 by Khanh Harden RN  Outcome: Progressing  Goal: Maintain or return mobility status to optimal level  Description  INTERVENTIONS:  - Assess patient's baseline mobility status (ambulation, transfers, stairs, etc )    - Identify cognitive and physical deficits and behaviors that affect mobility  - Identify mobility aids required to assist with transfers and/or ambulation (gait belt, sit-to-stand, lift, walker, cane, etc )  - [place] fall precautions as indicated by assessment  - Record patient progress and toleration of activity level on Mobility SBAR; progress patient to next Phase/Stage  - Instruct patient to call for assistance with activity based on assessment  - Consider rehabilitation consult to assist with strengthening/weightbearing, etc   7/10/2020 1134 by Melville Gaucher, RN  Outcome: Adequate for Discharge  7/10/2020 1109 by Melville Gaucher, RN  Outcome: Progressing     Problem: DISCHARGE PLANNING  Goal: Discharge to home or other facility with appropriate resources  Description  INTERVENTIONS:  - Identify barriers to discharge w/patient and caregiver  - Arrange for needed discharge resources and transportation as appropriate  - Identify discharge learning needs (meds, wound care, etc )  - Arrange for interpretive services to assist at discharge as needed  - Refer to Case Management Department for coordinating discharge planning if the patient needs post-hospital services based on physician/advanced practitioner order or complex needs related to functional status, cognitive ability, or social support system  7/10/2020 1134 by Melville Gaucher, RN  Outcome: Adequate for Discharge  7/10/2020 1109 by Melville Gaucher, RN  Outcome: Progressing     Problem: ALTERATION IN THE BREAST  Goal: Optimize infant feeding at the breast  Description  INTERVENTIONS:  - Latch, breast and nipple assessment  - Assess prior breast feeding history  - Hand expression of breast milk  - For cracked, bleeding and or sore nipples reassess latch, treat damaged nipple  -Educate mother on feeding cues  -Positioning/latch techniques  7/10/2020 1134 by Adrianne De Souza RN  Outcome: Adequate for Discharge  7/10/2020 1109 by Adrianne De Souza RN  Outcome: Progressing     Problem: INADEQUATE LATCH, SUCK OR SWALLOW  Goal: Demonstrate ability to latch and sustain latch, audible swallowing and satiety  Description  INTERVENTIONS:  - Assess oral anatomy, notify Physician/AP for abnormal findings  - Establish milk expression  - Maximize feeding opportunity (skin to skin, behavioral state)  - Position/latch techniques  - Discourage use of pacifier-artificial nipple  - Mechanical pumping  - Nipple Shield  - Supplemental formula feeding (Physician/AP order)  - Alternative feeding method  7/10/2020 1134 by Adrianne De Souza RN  Outcome: Adequate for Discharge  7/10/2020 1109 by Adrianne De Souza RN  Outcome: Progressing

## 2020-07-13 NOTE — UTILIZATION REVIEW
Notification of Maternity/Delivery & [facility] Birth Information for Admission - Your request has been successfully submitted and the reference number is [id number]     Notification of Maternity/Delivery for Admission to our facility [address]  Be advised that this patient was admitted to our facility under Inpatient Status  Contact Eliana Harada at [phone] for additional admission information  [facility] PAR[facility]CHILD [facility]  DEPT DEDICATED [place] [phone]  Mother &  Information   Patient Name: Emily Kaur   YOB: 1985   Delivering clinician: [facility]   OB History        2    Para   2    Term   2            AB        Living   2       SAB        TAB        Ectopic        Multiple   0    Live Births   2               [facility] Name & MRN:   Information for the patient's :  Krysten Lawton [[id number]]      Delivery Information:  Sex: female  Delivered 2020 6:22 PM by Vaginal, Spontaneous; Gestational Age: 37w0d     Measurements:  Weight: 6 lb 9 oz (2977 g); Height: 19"    APGAR 1 minute 5 minutes 10 minutes   Totals: 9 9      [place] Birth Information: 30 y o  female MRN: [id number] Unit/Bed#: -01 Estimated Date of Delivery: 20  Birthweight: No birth weight on file   Gestational Age: <None> Delivery Type: Vaginal, Spontaneous      Authorization Information   Servicing Facility:   [address] [place] [address]  Tax ID: [id number]  NPI: [id number] Attending Provider/NPI:  Phone:  Address: Stacey Romero[address]  [phone]  Same as ANDERS/Artuor Parrish 1106 of Service Code:  24 Place of Service Name: [address] [place] [address]   Start Date: 20   Discharge Date & Time: 7/10/2020 10:56 AM    Type of Admission: Inpatient Status Discharge Disposition (if discharged): Home/Self Care   Patient Diagnoses:   Encounter for full-term uncomplicated delivery [Q60]  The primary encounter diagnosis was 40 weeks gestation of pregnancy  A diagnosis of  (spontaneous vaginal delivery) was also pertinent to this visit  1  40 weeks gestation of pregnancy    2   (spontaneous vaginal delivery)       Orders: Admission Orders (From admission, onward)     Ordered        20 0820  Inpatient Admission  Once                    Assigned Utilization Review Contact: Radha Max  Utilization   Network Utilization Review Department  Phone: [phone]; Fax [phone]  Email: [email or web address]

## 2020-07-15 LAB — PLACENTA IN STORAGE: NORMAL

## 2020-07-21 ENCOUNTER — TELEPHONE (OUTPATIENT)
Dept: OBGYN CLINIC | Facility: CLINIC | Age: 35
End: 2020-07-21

## 2020-07-21 NOTE — TELEPHONE ENCOUNTER
Can offer appt to be examined  Can try cold compresses and hydrocortisone in the interim  May still be continued inflammation from delivery

## 2020-07-21 NOTE — TELEPHONE ENCOUNTER
Pt called states having "buldging feeling" in vagina   20  Off and on vagina bleeding  Pt felt the vaginal area - states area feels harder  Itchiness occurring as well  Denies any lumps or protruding  Denies swelling, odor or fever/chills  Last bowel movement this AM able to pass without a problem, urination normal  Pt is pumping

## 2020-07-21 NOTE — TELEPHONE ENCOUNTER
Reviewed with pt  Pt will try to see if that could help symptoms  Advised pt to contact with any changes  The oncall service is available for pt during non office hours

## 2020-07-27 ENCOUNTER — POSTPARTUM VISIT (OUTPATIENT)
Dept: OBGYN CLINIC | Facility: CLINIC | Age: 35
End: 2020-07-27

## 2020-07-27 VITALS
WEIGHT: 145.6 LBS | BODY MASS INDEX: 24.86 KG/M2 | DIASTOLIC BLOOD PRESSURE: 70 MMHG | SYSTOLIC BLOOD PRESSURE: 112 MMHG | HEIGHT: 64 IN | TEMPERATURE: 98.3 F

## 2020-07-27 PROCEDURE — 99024 POSTOP FOLLOW-UP VISIT: CPT | Performed by: OBSTETRICS & GYNECOLOGY

## 2020-07-27 NOTE — PROGRESS NOTES
Postpartum Visit: Patient here for postpartum visit  Patient is a 59-year-old  She is 3 weeks post partum following a spontaneous vaginal delivery  I have fully reviewed the prenatal and intrapartum course  The delivery was at 40 gestational weeks  Outcome:   Anesthesia: epidural   Postpartum course has been uncomplicated  Patient has been feeling vaginal pressure starts later in the day resolves after resting overnight does not really have time to rest during the day  Counseled discussed and reviewed  Baby's course has been doing well without problems  Baby is feeding breast   Patient is tolerating regular diet, Bleeding thin lochia  Bowel function is normal  Bladder function is normal  Patient is not sexually active  Contraception method is condoms    Patient does not desire to discuss additional options at this time but will discuss them when she returns for annual in 3 months Postpartum depression screening: negative  EPDS 3    Physical:   Vitals:    20 0847   BP: 112/70   Temp: 98 3 °F (36 8 °C)       Objective     /70 (BP Location: Left arm, Patient Position: Sitting, Cuff Size: Standard)   Temp 98 3 °F (36 8 °C)   Ht 5' 4" (1 626 m)   Wt 66 kg (145 lb 9 6 oz)   LMP 2019 (Approximate) Comment: ovulated late and cycles are not regular  Breastfeeding Yes Comment: Pumping bottle feeding   BMI 24 99 kg/m²   General appearance: alert and oriented, in no acute distress  Lungs: clear to auscultation bilaterally  Heart: regular rate and rhythm, S1, S2 normal, no murmur, click, rub or gallop  Abdomen: soft, non-tender; bowel sounds normal; no masses,  no organomegaly  Pelvic: external genitalia normal, vagina normal without discharge, uterus normal size, shape, and consistency, no cervical motion tenderness, cervix normal in appearance, no adnexal masses or tenderness, rectovaginal septum normal and Patient is well healed with minimal cervical descensus on strain        Assessment 61-year-old  3 weeks postpartum steadily recovering feeling some pressure symptoms      Plan:  Discussed resting when able and call with any other concerns and return in 3 months for annual or sooner as needed

## 2020-07-29 ENCOUNTER — HOSPITAL ENCOUNTER (OUTPATIENT)
Dept: ULTRASOUND IMAGING | Facility: HOSPITAL | Age: 35
Discharge: HOME | End: 2020-07-29
Payer: COMMERCIAL

## 2020-07-29 DIAGNOSIS — E04.2 MULTIPLE THYROID NODULES: ICD-10-CM

## 2020-07-29 PROCEDURE — 76536 US EXAM OF HEAD AND NECK: CPT

## 2020-07-30 LAB
T4 FREE SERPL-MCNC: 1.2 NG/DL (ref 0.8–1.8)
TSH SERPL-ACNC: 0.71 MIU/L

## 2020-07-31 ENCOUNTER — TELEPHONE (OUTPATIENT)
Dept: ENDOCRINOLOGY | Facility: CLINIC | Age: 35
End: 2020-07-31

## 2020-07-31 NOTE — TELEPHONE ENCOUNTER
Patient will need to decide if she wants an US guided biopsy  This discussion can wait until she sees Nereida in a few weeks   TX

## 2020-08-12 ENCOUNTER — TELEPHONE (OUTPATIENT)
Dept: ENDOCRINOLOGY | Facility: CLINIC | Age: 35
End: 2020-08-12

## 2020-08-12 DIAGNOSIS — E04.2 MULTIPLE THYROID NODULES: Primary | ICD-10-CM

## 2020-08-12 NOTE — TELEPHONE ENCOUNTER
Spoke with patient by phone,apologized for delay, was planning on reviewing at upcoming visit  reviewed results, ordered biopsies

## 2020-08-12 NOTE — TELEPHONE ENCOUNTER
Pt called stating she did not hear from us re: lab results and US results  Can you review and let us know what to tell her?   Thanks

## 2020-08-13 ENCOUNTER — TELEPHONE (OUTPATIENT)
Dept: ENDOCRINOLOGY | Facility: CLINIC | Age: 35
End: 2020-08-13

## 2020-08-13 NOTE — TELEPHONE ENCOUNTER
----- Message from Ester Platt PA-C sent at 8/12/2020  4:24 PM EDT -----  Spoke with patient by phone, FNA with afirma ordered of both nodules  She wants to get done ASAP and will want results ASAP if possible  IF she can get FNA done next week, will review results at 8/24 visit or by phone if available however informed her that I will be off next week depending on the FNA date may need to wait if covering providers not comfortable reviewing by phone

## 2020-08-21 ENCOUNTER — HOSPITAL ENCOUNTER (OUTPATIENT)
Dept: ULTRASOUND IMAGING | Facility: HOSPITAL | Age: 35
Discharge: HOME | End: 2020-08-21
Admitting: RADIOLOGY
Payer: COMMERCIAL

## 2020-08-21 DIAGNOSIS — E04.2 MULTIPLE THYROID NODULES: ICD-10-CM

## 2020-08-21 PROCEDURE — 88173 CYTOPATH EVAL FNA REPORT: CPT | Performed by: PATHOLOGY

## 2020-08-21 PROCEDURE — 88172 CYTP DX EVAL FNA 1ST EA SITE: CPT | Performed by: PATHOLOGY

## 2020-08-21 PROCEDURE — 10006 FNA BX W/US GDN EA ADDL: CPT

## 2020-08-21 PROCEDURE — 10005 FNA BX W/US GDN 1ST LES: CPT

## 2020-08-21 RX ORDER — LIDOCAINE HYDROCHLORIDE 10 MG/ML
5 INJECTION, SOLUTION EPIDURAL; INFILTRATION; INTRACAUDAL; PERINEURAL ONCE
Status: COMPLETED | OUTPATIENT
Start: 2020-08-21 | End: 2020-08-21

## 2020-08-21 RX ADMIN — LIDOCAINE HYDROCHLORIDE 5 ML: 10 INJECTION, SOLUTION EPIDURAL; INFILTRATION; INTRACAUDAL; PERINEURAL at 08:43

## 2020-08-24 ENCOUNTER — OFFICE VISIT (OUTPATIENT)
Dept: ENDOCRINOLOGY | Facility: CLINIC | Age: 35
End: 2020-08-24
Payer: COMMERCIAL

## 2020-08-24 VITALS
HEART RATE: 51 BPM | HEIGHT: 64 IN | BODY MASS INDEX: 24.96 KG/M2 | DIASTOLIC BLOOD PRESSURE: 72 MMHG | TEMPERATURE: 97.5 F | WEIGHT: 146.2 LBS | SYSTOLIC BLOOD PRESSURE: 100 MMHG

## 2020-08-24 DIAGNOSIS — E04.2 MULTIPLE THYROID NODULES: Primary | ICD-10-CM

## 2020-08-24 DIAGNOSIS — R79.89 LOW TSH LEVEL: ICD-10-CM

## 2020-08-24 PROCEDURE — 99214 OFFICE O/P EST MOD 30 MIN: CPT | Performed by: PHYSICIAN ASSISTANT

## 2020-08-24 RX ORDER — DIPHENOXYLATE HYDROCHLORIDE AND ATROPINE SULFATE 2.5; .025 MG/1; MG/1
1 TABLET ORAL DAILY
COMMUNITY

## 2020-08-24 NOTE — ASSESSMENT & PLAN NOTE
Due to increase in size of nodules, FNA was performed  It is possible that Growth may have been due to recent pregnancy  Results are pending  Reviewed potential outcomes and need for follow up if results are benign, intermediate, or suspicious  Will call patient and review results by phone  She was given an order for repeat ultrasound and follow up in 6 months if FNA results are benign  no

## 2020-08-24 NOTE — PROGRESS NOTES
Established Patient Progress Note       Chief Complaint   Patient presents with    Thyroid Problem        History of Present Illness: Laure Burton is a 30 y o  female with a history of low TSH during pregnancy and thyroid nodules  Currently she is 7 weeks post-partum and is feeling well, though not sleeping  She has recent FNA of thyroid nodules due to growth of nodules on ultrasound  Denies dysphagia  Denies FH of thyroid CA or radiation treatment to head/neck           Patient Active Problem List   Diagnosis    Low TSH level    Multiple thyroid nodules    Abnormal thyroid screen (blood)    History of hyperthyroidism    Prenatal care, subsequent pregnancy, second trimester    40 weeks gestation of pregnancy     (spontaneous vaginal delivery)      Past Medical History:   Diagnosis Date    Amenorrhea     Contraceptive use     RESOLVED: [id number]    Disease of thyroid gland     stabilized no meds nodules    History of early menarche     AGE 13    Irregular menstrual cycle     LAST ASSESSED: [id number]    Spotting in pregnancy, antepartum condition or complication     RESOLVED: [id number]    Thyroid disease     Thyrotoxicosis 2009    Varicella     POS HX      Past Surgical History:   Procedure Laterality Date    US GUIDED THYROID BIOPSY  2020    WISDOM TOOTH EXTRACTION        Family History   Problem Relation Age of Onset   Florence Ingham Breast cancer Mother 36        dx in early 42's, BRCA negative    Thyroid disease Mother    [de-identified] / [place] Mother     Cancer Mother     Thyroid disease Maternal Grandmother     Hearing loss Maternal Grandfather     Hypertension Paternal Grandmother     Heart disease Paternal Grandmother     Diabetes Maternal Uncle     Hypertension Father     No Known Problems Daughter     No Known Problems Maternal Aunt      Social History     Tobacco Use    Smoking status: Never Smoker    Smokeless tobacco: Never Used   Substance Use Topics    Alcohol use: Yes     Frequency: Monthly or less     Drinks per session: 1 or 2     Binge frequency: Never     Comment: socially     No Known Allergies    Current Outpatient Medications:     calcium carbonate (OS-TONI) 600 MG tablet, Take 600 mg by mouth 2 (two) times a day with meals, Disp: , Rfl:     multivitamin (THERAGRAN) TABS, Take 1 tablet by mouth daily, Disp: , Rfl:     Review of Systems   Constitutional: Negative for activity change, appetite change, chills, diaphoresis, fatigue, fever and unexpected weight change  HENT: Negative for trouble swallowing and voice change  Eyes: Negative for visual disturbance  Respiratory: Negative for shortness of breath  Cardiovascular: Negative for chest pain and palpitations  Gastrointestinal: Negative for abdominal pain, constipation and diarrhea  Endocrine: Negative for cold intolerance, heat intolerance, polydipsia, polyphagia and polyuria  Genitourinary: Negative for frequency and menstrual problem  Musculoskeletal: Negative for arthralgias and myalgias  Skin: Negative for rash  Allergic/Immunologic: Negative for food allergies  Neurological: Negative for dizziness and tremors  Hematological: Negative for adenopathy  Psychiatric/Behavioral: Negative for sleep disturbance  All other systems reviewed and are negative  Physical Exam:  Body mass index is 25 1 kg/m²  /72   Pulse (!) 51   Temp 97 5 °F (36 4 °C)   Ht 5' 4" (1 626 m)   Wt 66 3 kg (146 lb 3 2 oz)   BMI 25 10 kg/m²    Wt Readings from Last 3 Encounters:   08/24/20 66 3 kg (146 lb 3 2 oz)   07/27/20 66 kg (145 lb 9 6 oz)   07/08/20 71 7 kg (158 lb)       Physical Exam  Vitals signs reviewed  Constitutional:       General: She is not in acute distress  Appearance: She is well-developed  HENT:      Head: Normocephalic and atraumatic  Eyes:      Conjunctiva/sclera: Conjunctivae normal       Pupils: Pupils are equal, round, and reactive to light     Neck: Musculoskeletal: Normal range of motion and neck supple  Thyroid: No thyromegaly  Cardiovascular:      Rate and Rhythm: Normal rate and regular rhythm  Heart sounds: Normal heart sounds  Pulmonary:      Effort: Pulmonary effort is normal  No respiratory distress  Breath sounds: Normal breath sounds  No wheezing or rales  Abdominal:      General: Bowel sounds are normal  There is no distension  Palpations: Abdomen is soft  Tenderness: There is no abdominal tenderness  Musculoskeletal: Normal range of motion  Skin:     General: Skin is warm and dry  Neurological:      Mental Status: She is alert and oriented to person, place, and time  Labs:     Component      Latest Ref Rng & Units 5/28/2020 5/28/2020 7/29/2020 7/29/2020          12:20 PM 12:20 PM 12:15 PM 12:15 PM   Free T4      0 8 - 1 8 ng/dL 1 1  1 2    TSH, POC      mIU/L  0 77  0 71       Impression & Plan:    Problem List Items Addressed This Visit        Endocrine    Multiple thyroid nodules - Primary     Due to increase in size of nodules, FNA was performed  It is possible that Growth may have been due to recent pregnancy  Results are pending  Reviewed potential outcomes and need for follow up if results are benign, intermediate, or suspicious  Will call patient and review results by phone  She was given an order for repeat ultrasound and follow up in 6 months if FNA results are benign  Relevant Orders    US thyroid       Other    Low TSH level     TSH normal on recent lab testing  Orders Placed This Encounter   Procedures    US thyroid     Standing Status:   Future     Standing Expiration Date:   8/24/2021     Scheduling Instructions:      No prep required  Please bring your physician order, insurance cards, a form of photo ID and a list of your medications with you  Arrive 15 minutes prior to your appointment time in order to register              To schedule this appointment, please contact central scheduling at [phone]     Order Specific Question:   Reason for Exam:     Answer:   Thyroid disorder     Order Specific Question:   Is the patient pregnant? Answer:   No       There are no Patient Instructions on file for this visit  Discussed with the patient and all questioned fully answered  She will call me if any problems arise  Follow-up appointment in 6 months       Counseled patient on diagnostic results, prognosis, risk and benefit of treatment options, instruction for management, importance of treatment compliance, Risk  factor reduction and impressions      Ravi Brooks PA-C

## 2020-08-24 NOTE — ASSESSMENT & PLAN NOTE
Patient : Bk Soares Jr. Age: 25 year old Sex: male   MRN: [id number] Encounter Date: 11/29/2019      History     Chief Complaint   Patient presents with   • Testicle Pain     HPI    A 25-year-old male reports emergency department complaint of achiness the right testicle ongoing for 1 week radiates into his right groin.  Denies penile discharge.  Denies hematuria or dysuria.  No nausea or vomiting.  No testicular trauma.  No abdominal trauma.  No fever chills.  Denies swelling testicle./     No Known Allergies    Discharge Medication List as of 11/29/2019 10:23 PM      Prior to Admission Medications    Details   diphenhydrAMINE (BENADRYL) 25 MG capsule Take 25 mg by mouth every 6 hours as needed.Historical Med, 25 mg, Oral, EVERY 6 HOURS PRN             Discharge Medication List as of 11/29/2019 10:23 PM          No past medical history on file.    No past surgical history on file.    No family history on file.    Social History     Tobacco Use   • Smoking status: Never Smoker   • Smokeless tobacco: Never Used   Substance Use Topics   • Alcohol use: No   • Drug use: No       Review of Systems   Constitutional: Negative for chills and fever.   Gastrointestinal: Negative for abdominal pain, nausea and vomiting.   Genitourinary: Positive for testicular pain. Negative for dysuria, frequency, genital sores, hematuria, penile swelling, scrotal swelling and urgency.   Neurological: Negative for weakness and headaches.   All other systems reviewed and are negative.      Physical Exam     ED Triage Vitals [11/29/19 2120]   ED Triage Vitals Group      Temp 96.8 °F (36 °C)      Pulse 58      Resp 16      /62      SpO2 99 %      EtCO2 mmHg       Height       Weight 187 lb 13.3 oz (85.2 kg)      Weight Scale Used ED Actual      BMI (Calculated)       IBW/kg (Calculated)        Physical Exam   Constitutional: He appears well-developed and well-nourished. No distress.   HENT:   Head: Normocephalic and atraumatic.  TSH normal on recent lab testing    Pulmonary/Chest: Effort normal. No respiratory distress.   Abdominal: Soft. He exhibits no distension. There is no tenderness. Hernia confirmed negative in the right inguinal area and confirmed negative in the left inguinal area.   Genitourinary:    Penis normal.   Right testis shows tenderness. Right testis shows no mass and no swelling. Right testis is descended. Cremasteric reflex is not absent on the right side. Left testis shows no mass, no swelling and no tenderness. Left testis is descended. Cremasteric reflex is not absent on the left side. No penile tenderness. No discharge found.    Genitourinary Comments: Mild prominence of the right epididymitis with mild tenderness.     Neurological: He is alert.   Skin: Skin is warm.   Nursing note and vitals reviewed.      ED Course     Procedures    Lab Results     No results found for this visit on 11/29/19.        Radiology Results     Imaging Results          US Testicles and Scrotum (Final result)  Result time 11/29/19 22:16:07    Final result                 Impression:    IMPRESSION:  1. Normal ultrasound appearance of both testes. There is no testicular mass  or evidence of torsion.  2. Moderate right varicocele.               Narrative:    EXAM: US TESTICLES AND SCROTUM    INDICATION: Right testicular pain. No history of trauma.     COMPARISON: None    TECHNIQUE: Ultrasound scrotum and its contents was performed. This includes  grayscale, spectral duplex, and color Doppler evaluation of the testes.    FINDINGS: The right testicle measures 4.1 x 2.9 x 2.2 cm. It is normal in  echogenicity with normal color blood flow. There is no intratesticular or  extratesticular mass lesion. The right epididymis is also normal in  appearance. There is no hydrocele. There is a moderate varicocele. The left  testicle measures 3.5 x 3.0 x 2.7 cm. It is normal in echogenicity with  normal color blood flow. There is no intratesticular mass lesion. Left  epididymis is also  normal in appearance with normal color blood flow. There  is no hydrocele or varicocele.                                ED Medication Orders (From admission, onward)    Ordered Start     Status Ordering Provider    11/29/19 2213 11/29/19 2214    ONCE      Discontinued EDSON LEDBETTER    11/29/19 2132 11/29/19 2133  ibuprofen (MOTRIN) tablet 600 mg  ONCE      Last MAR action:  Given EDSON LEDBETTER               MDM    Patient with nontraumatic right testicular tenderness ongoing for 1 week.  Patient reports he was evaluated in urgent care setting and diagnosed with epididymitis.  Patient denies any recent ultrasounds.  Reports that the pain is dull and radiating into his right groin had has not improved his seeking re-evaluation.    Mild prominence of the right epididymitis noted on evaluation otherwise unremarkable.  Plan is for ultrasound evaluation at this time offer the patient ibuprofen he accepts.    Varicocele noted on ultrasound right testicle.  Findings discussed the patient.  Symptomatic care discussed with the patient.  Patient reports that he has just started running within the last week or recommended the patient discontinue running for the next 5-7 days and use compression garment at all times.  If his right testicular discomfort improved within 5-7 days he may slowly reinitiate running however if pain substantially worsens or concerning symptoms arise he should report back to the emergency department.  Patient is agreeable to the plan as above.  Discussed following up with primary care provider.  Criteria for return to the emergency department discussed.  All questions answered prior to discharge.    Clinical Impression     ED Diagnosis   1. Varicocele         Disposition        Discharge 11/29/2019 10:21 PM  kB Soares Jr. discharge to home/self care.           Edson Ledbetter PA-C  11/29/19 2248

## 2020-10-29 ENCOUNTER — ANNUAL EXAM (OUTPATIENT)
Dept: OBGYN CLINIC | Facility: CLINIC | Age: 35
End: 2020-10-29
Payer: COMMERCIAL

## 2020-10-29 VITALS
BODY MASS INDEX: 23.86 KG/M2 | SYSTOLIC BLOOD PRESSURE: 110 MMHG | TEMPERATURE: 97.2 F | DIASTOLIC BLOOD PRESSURE: 74 MMHG | WEIGHT: 139 LBS

## 2020-10-29 DIAGNOSIS — Z71.85 HPV VACCINE COUNSELING: ICD-10-CM

## 2020-10-29 DIAGNOSIS — Z12.31 ENCOUNTER FOR SCREENING MAMMOGRAM FOR MALIGNANT NEOPLASM OF BREAST: ICD-10-CM

## 2020-10-29 DIAGNOSIS — Z80.3 FAMILY HISTORY OF BREAST CANCER IN MOTHER: ICD-10-CM

## 2020-10-29 DIAGNOSIS — Z01.419 ENCOUNTER FOR GYNECOLOGICAL EXAMINATION (GENERAL) (ROUTINE) WITHOUT ABNORMAL FINDINGS: Primary | ICD-10-CM

## 2020-10-29 PROBLEM — Z34.82 PRENATAL CARE, SUBSEQUENT PREGNANCY, SECOND TRIMESTER: Status: RESOLVED | Noted: 2020-01-29 | Resolved: 2020-10-29

## 2020-10-29 PROBLEM — Z3A.40 40 WEEKS GESTATION OF PREGNANCY: Status: RESOLVED | Noted: 2020-07-08 | Resolved: 2020-10-29

## 2020-10-29 PROCEDURE — 99395 PREV VISIT EST AGE 18-39: CPT | Performed by: PHYSICIAN ASSISTANT

## 2020-11-01 LAB
CYTOLOGIST CVX/VAG CYTO: NORMAL
DX ICD CODE: NORMAL
HPV I/H RISK 1 DNA CVX QL PROBE+SIG AMP: NEGATIVE
OTHER STN SPEC: NORMAL
PATH REPORT.FINAL DX SPEC: NORMAL
SL AMB NOTE:: NORMAL
SL AMB SPECIMEN ADEQUACY: NORMAL

## 2020-11-02 ENCOUNTER — TELEPHONE (OUTPATIENT)
Dept: OBGYN CLINIC | Facility: CLINIC | Age: 35
End: 2020-11-02

## 2020-11-24 ENCOUNTER — TELEPHONE (OUTPATIENT)
Dept: OBGYN CLINIC | Facility: CLINIC | Age: 35
End: 2020-11-24

## 2021-02-08 ENCOUNTER — HOSPITAL ENCOUNTER (OUTPATIENT)
Dept: ULTRASOUND IMAGING | Facility: HOSPITAL | Age: 36
Discharge: HOME | End: 2021-02-08
Payer: COMMERCIAL

## 2021-02-08 DIAGNOSIS — E04.2 MULTIPLE THYROID NODULES: ICD-10-CM

## 2021-02-08 PROCEDURE — 76536 US EXAM OF HEAD AND NECK: CPT

## 2021-03-01 ENCOUNTER — OFFICE VISIT (OUTPATIENT)
Dept: ENDOCRINOLOGY | Facility: CLINIC | Age: 36
End: 2021-03-01
Payer: COMMERCIAL

## 2021-03-01 VITALS
BODY MASS INDEX: 23.76 KG/M2 | HEART RATE: 64 BPM | SYSTOLIC BLOOD PRESSURE: 130 MMHG | DIASTOLIC BLOOD PRESSURE: 80 MMHG | WEIGHT: 139.2 LBS | HEIGHT: 64 IN

## 2021-03-01 DIAGNOSIS — E04.2 MULTIPLE THYROID NODULES: Primary | ICD-10-CM

## 2021-03-01 DIAGNOSIS — R79.89 LOW TSH LEVEL: ICD-10-CM

## 2021-03-01 PROCEDURE — 99213 OFFICE O/P EST LOW 20 MIN: CPT | Performed by: INTERNAL MEDICINE

## 2021-03-01 NOTE — PROGRESS NOTES
Kan Baer 29 y o  female MRN: [id number]    Encounter: [id number]      Assessment/Plan     Assessment: This is a 29 y o  female with a history of low TSH during pregnancy and thyroid nodules  Plan:    Diagnoses and all orders for this visit:    Multiple thyroid nodules -  Repeat thyroid ultrasound in 1 year  -     US thyroid; Future    Low TSH level -  Check thyroid labs now, monitor thyroid labs yearly  -     TSH, 3rd generation with Free T4 reflex; Future  -     T4, free Lab Collect; Future  -     TSH, 3rd generation Lab Collect; Future  -     T4, free Lab Collect; Future     I will see patient back in my office in 1 year  CC: low TSH during pregnancy, multiple thyroid nodules    History of Present Illness     HPI:  This is a 29 y o  female with a history of low TSH during pregnancy and thyroid nodules  This has normalized since that time  Patient gave birth 7/2020  She admits to anxiety, no other symptoms symptoms  She had recent FNA of thyroid nodules - with benign results  Denies dysphagia  Denies FH of thyroid CA or radiation treatment to head/neck  Denies taking any supplements, no recent iodine contrast     Last thyroid US in 2/2021 - showed stable thyroid nodules, one new additional well-circumscribed nodule measuring 0 7 x 0 5 x 0 7 cm in the mid right thyroid lobe, does not meet the criteria for either follow-up or FNA      She takes vitamin D in her multivitamin and calcium supplements  Review of Systems   Constitutional: Negative for chills, fatigue and fever  HENT: Negative for congestion, postnasal drip and sore throat  Eyes: Negative for pain  Respiratory: Negative for cough and shortness of breath  Cardiovascular: Negative for chest pain, palpitations and leg swelling  Gastrointestinal: Negative for abdominal pain, blood in stool, constipation, diarrhea and nausea  Endocrine: Negative for polydipsia and polyuria  Genitourinary: Negative for dysuria  Musculoskeletal: Negative for arthralgias and back pain  Neurological: Negative for dizziness, light-headedness and headaches  Psychiatric/Behavioral: Negative for behavioral problems  The patient is not nervous/anxious  All other systems reviewed and are negative        Historical Information   Past Medical History:   Diagnosis Date    Amenorrhea     Contraceptive use     RESOLVED: [id number]    Disease of thyroid gland     stabilized no meds nodules    History of early menarche     AGE 13    Irregular menstrual cycle     LAST ASSESSED: [id number]    Spotting in pregnancy, antepartum condition or complication     RESOLVED: [id number]    Thyroid disease     Thyrotoxicosis 2009    Varicella     POS HX     Past Surgical History:   Procedure Laterality Date    US GUIDED THYROID BIOPSY  8/21/2020    benign    WISDOM TOOTH EXTRACTION       Social History   Social History     Substance and Sexual Activity   Alcohol Use Yes    Frequency: Monthly or less    Drinks per session: 1 or 2    Binge frequency: Never    Comment: socially     Social History     Substance and Sexual Activity   Drug Use No     Social History     Tobacco Use   Smoking Status Never Smoker   Smokeless Tobacco Never Used     Family History:   Family History   Problem Relation Age of Onset    Breast cancer Mother 36        dx in early 42's, BRCA negative    Thyroid disease Mother     Miscarriages / [place] Mother     Cancer Mother     Thyroid disease Maternal Grandmother     Hearing loss Maternal Grandfather     Hypertension Paternal Grandmother     Heart disease Paternal Grandmother     Diabetes Maternal Uncle     Hypertension Father     No Known Problems Daughter     No Known Problems Maternal Aunt        Meds/Allergies   Current Outpatient Medications   Medication Sig Dispense Refill    calcium carbonate (OS-TONI) 600 MG tablet Take 600 mg by mouth 2 (two) times a day with meals      multivitamin (THERAGRAN) TABS Take 1 tablet by mouth daily       No current facility-administered medications for this visit  No Known Allergies    Objective   Vitals: Blood pressure 130/80, pulse 64, height 5' 4" (1 626 m), weight 63 1 kg (139 lb 3 2 oz), currently breastfeeding  Physical Exam  Constitutional:       Appearance: She is well-developed  HENT:      Head: Normocephalic and atraumatic  Eyes:      General: No scleral icterus  Right eye: No discharge  Left eye: No discharge  Conjunctiva/sclera: Conjunctivae normal       Pupils: Pupils are equal, round, and reactive to light  Neck:      Musculoskeletal: Neck supple  Thyroid: No thyromegaly  Cardiovascular:      Rate and Rhythm: Normal rate and regular rhythm  Heart sounds: No murmur  Pulmonary:      Effort: Pulmonary effort is normal  No respiratory distress  Breath sounds: Normal breath sounds  Abdominal:      General: There is no distension  Palpations: Abdomen is soft  Tenderness: There is no abdominal tenderness  Skin:     General: Skin is warm and dry  Neurological:      Mental Status: She is alert  Psychiatric:         Mood and Affect: Mood normal          The history was obtained from the review of the chart, patient  Lab Results:   Lab Results   Component Value Date/Time    Free t4 1 2 07/29/2020 12:15 PM    Free t4 1 1 05/28/2020 12:20 PM    Free t4 1 2 04/16/2020 08:43 AM       Imaging Studies:   Results for orders placed during the hospital encounter of 02/08/21   US thyroid    Impression The previously noted  right-sided thyroid nodules remain stable     Continued continue surveillance    Reference: ACR Thyroid Imaging, Reporting and Data System (TI-RADS): White Paper of the [employer]  J AM Patrick Radiol 2335;07:763-258  (additional recommendations based on American Thyroid Association 2015 guidelines )      Workstation performed: [id number]         I have personally reviewed pertinent reports  Portions of the record may have been created with voice recognition software  Occasional wrong word or "sound a like" substitutions may have occurred due to the inherent limitations of voice recognition software  Read the chart carefully and recognize, using context, where substitutions have occurred

## 2021-03-09 ENCOUNTER — HOSPITAL ENCOUNTER (OUTPATIENT)
Dept: RADIOLOGY | Facility: HOSPITAL | Age: 36
Discharge: HOME | End: 2021-03-09
Payer: COMMERCIAL

## 2021-03-09 VITALS — WEIGHT: 132 LBS | BODY MASS INDEX: 22.53 KG/M2 | HEIGHT: 64 IN

## 2021-03-09 DIAGNOSIS — Z80.3 FAMILY HISTORY OF BREAST CANCER IN MOTHER: ICD-10-CM

## 2021-03-09 DIAGNOSIS — Z12.31 ENCOUNTER FOR SCREENING MAMMOGRAM FOR MALIGNANT NEOPLASM OF BREAST: ICD-10-CM

## 2021-03-09 PROCEDURE — 77067 SCR MAMMO BI INCL CAD: CPT

## 2021-03-09 PROCEDURE — 77063 BREAST TOMOSYNTHESIS BI: CPT

## 2021-03-26 DIAGNOSIS — Z23 ENCOUNTER FOR IMMUNIZATION: ICD-10-CM

## 2021-07-20 ENCOUNTER — TELEPHONE (OUTPATIENT)
Dept: ENDOCRINOLOGY | Facility: CLINIC | Age: 36
End: 2021-07-20

## 2021-07-20 LAB
T4 FREE SERPL-MCNC: 1 NG/DL (ref 0.8–1.8)
TSH SERPL-ACNC: 0.95 MIU/L

## 2021-07-20 NOTE — TELEPHONE ENCOUNTER
----- Message from Jayme Davalos MD sent at 7/20/2021  9:12 AM EDT -----  Pls call patient regarding results: she has normal thyroid labs

## 2021-11-04 ENCOUNTER — ANNUAL EXAM (OUTPATIENT)
Dept: OBGYN CLINIC | Facility: CLINIC | Age: 36
End: 2021-11-04
Payer: COMMERCIAL

## 2021-11-04 VITALS
DIASTOLIC BLOOD PRESSURE: 80 MMHG | HEIGHT: 64 IN | SYSTOLIC BLOOD PRESSURE: 124 MMHG | BODY MASS INDEX: 22.02 KG/M2 | WEIGHT: 129 LBS

## 2021-11-04 DIAGNOSIS — Z01.419 GYNECOLOGIC EXAM NORMAL: Primary | ICD-10-CM

## 2021-11-04 DIAGNOSIS — N64.4 BREAST PAIN, LEFT: ICD-10-CM

## 2021-11-04 PROBLEM — K59.04 CHRONIC IDIOPATHIC CONSTIPATION: Status: ACTIVE | Noted: 2021-08-30

## 2021-11-04 PROBLEM — M94.0 COSTOCHONDRITIS, ACUTE: Status: ACTIVE | Noted: 2020-08-26

## 2021-11-04 PROBLEM — D50.9 IRON DEFICIENCY ANEMIA: Status: ACTIVE | Noted: 2020-10-14

## 2021-11-04 PROBLEM — E55.9 VITAMIN D DEFICIENCY: Status: ACTIVE | Noted: 2020-08-26

## 2021-11-04 PROCEDURE — S0612 ANNUAL GYNECOLOGICAL EXAMINA: HCPCS | Performed by: PHYSICIAN ASSISTANT

## 2021-11-05 ENCOUNTER — HOSPITAL ENCOUNTER (OUTPATIENT)
Dept: MAMMOGRAPHY | Facility: CLINIC | Age: 36
Discharge: HOME | End: 2021-11-05
Payer: COMMERCIAL

## 2021-11-05 ENCOUNTER — HOSPITAL ENCOUNTER (OUTPATIENT)
Dept: ULTRASOUND IMAGING | Facility: CLINIC | Age: 36
Discharge: HOME | End: 2021-11-05
Payer: COMMERCIAL

## 2021-11-05 VITALS — WEIGHT: 129 LBS | BODY MASS INDEX: 22.02 KG/M2 | HEIGHT: 64 IN

## 2021-11-05 DIAGNOSIS — N64.4 BREAST PAIN, LEFT: ICD-10-CM

## 2021-11-05 PROCEDURE — 76642 ULTRASOUND BREAST LIMITED: CPT

## 2021-11-05 PROCEDURE — 77065 DX MAMMO INCL CAD UNI: CPT

## 2021-11-05 PROCEDURE — G0279 TOMOSYNTHESIS, MAMMO: HCPCS

## 2021-11-29 ENCOUNTER — TELEPHONE (OUTPATIENT)
Dept: OBGYN CLINIC | Facility: CLINIC | Age: 36
End: 2021-11-29

## 2022-01-18 ENCOUNTER — TELEPHONE (OUTPATIENT)
Dept: ENDOCRINOLOGY | Facility: CLINIC | Age: 37
End: 2022-01-18

## 2022-01-18 NOTE — TELEPHONE ENCOUNTER
Patient called and stated that she went to an urgent care for heart palpitations that have been going on for a week, her blood work showed that her thyroid has bounced out of control to hyperactive  Should she be seen sooner than April?  Do you agree with the bloodwork results

## 2022-01-18 NOTE — TELEPHONE ENCOUNTER
Can you please schedule this patient for Dr Brenda Suárez    I had openings today - but next 2 days appear to be booked solid - so Friday 12 :20

## 2022-01-19 ENCOUNTER — TELEPHONE (OUTPATIENT)
Dept: ENDOCRINOLOGY | Facility: CLINIC | Age: 37
End: 2022-01-19

## 2022-01-19 NOTE — TELEPHONE ENCOUNTER
futter chest heart palpitations shows thyroid hyperactive levels low   Wants to get in soon wants blood work order and ultra sound order if we can prior to rescheduling appt

## 2022-01-27 ENCOUNTER — OFFICE VISIT (OUTPATIENT)
Dept: ENDOCRINOLOGY | Facility: CLINIC | Age: 37
End: 2022-01-27
Payer: COMMERCIAL

## 2022-01-27 VITALS
HEIGHT: 64 IN | BODY MASS INDEX: 22.23 KG/M2 | WEIGHT: 130.2 LBS | SYSTOLIC BLOOD PRESSURE: 110 MMHG | HEART RATE: 61 BPM | DIASTOLIC BLOOD PRESSURE: 80 MMHG

## 2022-01-27 DIAGNOSIS — E05.90 HYPERTHYROIDISM: ICD-10-CM

## 2022-01-27 DIAGNOSIS — E04.2 MULTIPLE THYROID NODULES: Primary | ICD-10-CM

## 2022-01-27 PROCEDURE — 99214 OFFICE O/P EST MOD 30 MIN: CPT | Performed by: INTERNAL MEDICINE

## 2022-01-27 NOTE — PROGRESS NOTES
Atul Mullen 40 y o  female MRN: [id number]    Encounter: [id number]      Assessment/Plan     Problem List Items Addressed This Visit        Endocrine    Hyperthyroidism     Symptomatically feeling better-clinically does not appear to be hyperthyroid-for now will repeat thyroid function test in the next couple of weeks  Also check TSI  She will hold biotin for a few days before repeating labs  If TSI positive will consider starting methimazole-side effects discussed  Relevant Orders    T4, free Lab Collect    TSH, 3rd generation Lab Collect    T3 Lab Collect    Thyroid Antibodies Panel Lab Collect    Thyroid stimulating immunoglobulin Lab Collect    CBC and differential Lab Collect    Comprehensive metabolic panel Lab Collect    Multiple thyroid nodules - Primary     She has upcoming thyroid ultrasound         Relevant Orders    T4, free Lab Collect    TSH, 3rd generation Lab Collect    T3 Lab Collect        CC:   Low TSH    History of Present Illness     HPI:  71-year-old female history of thyroid nodules here for follow-up  She had  Palpitations for the past few weeks , sleep disturbances ,worsening anxiety -was seen in ED and had labs done which showed an suppressed TSH  Weight loss - 3-4 lbs and gained back    palpitations - improved within last week and gained a couple of lbs back      menstrual cycles normal   LMP -a few weeks back  No constipation of hyper defecation     Heat intolerance     COVID booster in DEC    No neck pain , no URI , not on steroids , CT scan with IV dye   No otc or herbal meds   Takes Biotin   C/o hair loss    History of thyroid nodules and underwent FNAB 2020 which was negative for malignancy    Review of Systems    Historical Information   Past Medical History:   Diagnosis Date    Amenorrhea     Contraceptive use     RESOLVED: [id number]    Disease of thyroid gland     stabilized no meds nodules    History of early menarche     AGE 13    Irregular menstrual cycle LAST ASSESSED: [id number]    Spotting in pregnancy, antepartum condition or complication     RESOLVED: [id number]    Thyroid disease     Thyrotoxicosis 2009    Varicella     POS HX     Past Surgical History:   Procedure Laterality Date    US GUIDED THYROID BIOPSY  8/21/2020    benign    WISDOM TOOTH EXTRACTION       Social History   Social History     Substance and Sexual Activity   Alcohol Use Yes    Comment: socially     Social History     Substance and Sexual Activity   Drug Use No     Social History     Tobacco Use   Smoking Status Never Smoker   Smokeless Tobacco Never Used     Family History:   Family History   Problem Relation Age of Onset    Breast cancer Mother 36        dx in early 42's, BRCA negative    Thyroid disease Mother    [de-identified] / [place] Mother     Cancer Mother     Thyroid disease Maternal Grandmother     Hearing loss Maternal Grandfather     Hypertension Paternal Grandmother     Heart disease Paternal Grandmother     Diabetes Maternal Uncle     Hypertension Father     No Known Problems Daughter     No Known Problems Maternal Aunt        Meds/Allergies   Current Outpatient Medications   Medication Sig Dispense Refill    calcium carbonate (OS-TONI) 600 MG tablet Take 600 mg by mouth 2 (two) times a day with meals      multivitamin (THERAGRAN) TABS Take 1 tablet by mouth daily       No current facility-administered medications for this visit  No Known Allergies    Objective   Vitals: Blood pressure 110/80, pulse 61, height 5' 4" (1 626 m), weight 59 1 kg (130 lb 3 2 oz), not currently breastfeeding  Physical Exam  Vitals reviewed  Constitutional:       Appearance: Normal appearance  She is not ill-appearing or diaphoretic  HENT:      Head: Normocephalic and atraumatic  Eyes:      General: No scleral icterus  Extraocular Movements: Extraocular movements intact  Cardiovascular:      Rate and Rhythm: Normal rate and regular rhythm        Heart sounds: Normal heart sounds  No murmur heard  Pulmonary:      Effort: Pulmonary effort is normal  No respiratory distress  Breath sounds: Normal breath sounds  No wheezing  Abdominal:      General: There is no distension  Palpations: Abdomen is soft  Tenderness: There is no abdominal tenderness  Musculoskeletal:      Cervical back: Neck supple  Right lower leg: No edema  Left lower leg: No edema  Lymphadenopathy:      Cervical: No cervical adenopathy  Skin:     General: Skin is warm and dry  Neurological:      General: No focal deficit present  Mental Status: She is alert and oriented to person, place, and time  Psychiatric:         Mood and Affect: Mood normal          Behavior: Behavior normal          Thought Content: Thought content normal          Judgment: Judgment normal          The history was obtained from the review of the chart, patient  Lab Results:   Lab Results   Component Value Date/Time    Free t4 1 0 07/19/2021 12:58 PM     Specimens  A Thyroid, Right, Upper Pole  B Thyroid, Right, Upper Pole  C Thyroid, Right, Lower Pole  D Thyroid, Right, Lower Pole    Final Diagnosis  A B  Thyroid, Right, Upper Pole ( ThinPrep and smear preparations ):  Benign ([place] Category II) - See note  Benign oncocytes and colloid present  Satisfactory for evaluation  C D  Thyroid, Right, Lower Pole ( ThinPrep and smear preparations ):  Benign ([place] Category II) - See note  Benign oncocytes, benign follicular cells and colloid present  Satisfactory for  Imaging Studies:   Results for orders placed during the hospital encounter of 02/08/21    US thyroid    Impression  The previously noted  right-sided thyroid nodules remain stable    Continued continue surveillance    Reference: ACR Thyroid Imaging, Reporting and Data System (TI-RADS): White Paper of the [employer]   J AM Patrick Radiol 6406;35:477-087  (additional recommendations based on American Thyroid Association 2015 guidelines )      Workstation performed: [id number]      I have personally reviewed pertinent reports  Portions of the record may have been created with voice recognition software  Occasional wrong word or "sound a like" substitutions may have occurred due to the inherent limitations of voice recognition software  Read the chart carefully and recognize, using context, where substitutions have occurred

## 2022-01-28 NOTE — ASSESSMENT & PLAN NOTE
Symptomatically feeling better-clinically does not appear to be hyperthyroid-for now will repeat thyroid function test in the next couple of weeks  Also check TSI  She will hold biotin for a few days before repeating labs  If TSI positive will consider starting methimazole-side effects discussed

## 2022-02-05 ENCOUNTER — LAB (OUTPATIENT)
Dept: LAB | Facility: MEDICAL CENTER | Age: 37
End: 2022-02-05
Payer: COMMERCIAL

## 2022-02-05 DIAGNOSIS — R79.89 LOW TSH LEVEL: ICD-10-CM

## 2022-02-05 DIAGNOSIS — E04.2 MULTIPLE THYROID NODULES: ICD-10-CM

## 2022-02-05 DIAGNOSIS — E05.90 HYPERTHYROIDISM: ICD-10-CM

## 2022-02-05 LAB
ALBUMIN SERPL BCP-MCNC: 3.7 G/DL (ref 3.5–5)
ALP SERPL-CCNC: 54 U/L (ref 46–116)
ALT SERPL W P-5'-P-CCNC: 21 U/L (ref 12–78)
ANION GAP SERPL CALCULATED.3IONS-SCNC: 3 MMOL/L (ref 4–13)
AST SERPL W P-5'-P-CCNC: 16 U/L (ref 5–45)
BASOPHILS # BLD AUTO: 0.02 THOUSANDS/ΜL (ref 0–0.1)
BASOPHILS NFR BLD AUTO: 1 % (ref 0–1)
BILIRUB SERPL-MCNC: 0.46 MG/DL (ref 0.2–1)
BUN SERPL-MCNC: 7 MG/DL (ref 5–25)
CALCIUM SERPL-MCNC: 9.7 MG/DL (ref 8.3–10.1)
CHLORIDE SERPL-SCNC: 110 MMOL/L (ref 100–108)
CO2 SERPL-SCNC: 26 MMOL/L (ref 21–32)
CREAT SERPL-MCNC: 0.72 MG/DL (ref 0.6–1.3)
EOSINOPHIL # BLD AUTO: 0.15 THOUSAND/ΜL (ref 0–0.61)
EOSINOPHIL NFR BLD AUTO: 4 % (ref 0–6)
ERYTHROCYTE [DISTWIDTH] IN BLOOD BY AUTOMATED COUNT: 14.7 % (ref 11.6–15.1)
GFR SERPL CREATININE-BSD FRML MDRD: 108 ML/MIN/1.73SQ M
GLUCOSE P FAST SERPL-MCNC: 91 MG/DL (ref 65–99)
HCT VFR BLD AUTO: 39.7 % (ref 34.8–46.1)
HGB BLD-MCNC: 12 G/DL (ref 11.5–15.4)
IMM GRANULOCYTES # BLD AUTO: 0 THOUSAND/UL (ref 0–0.2)
IMM GRANULOCYTES NFR BLD AUTO: 0 % (ref 0–2)
LYMPHOCYTES # BLD AUTO: 1.25 THOUSANDS/ΜL (ref 0.6–4.47)
LYMPHOCYTES NFR BLD AUTO: 32 % (ref 14–44)
MCH RBC QN AUTO: 23.8 PG (ref 26.8–34.3)
MCHC RBC AUTO-ENTMCNC: 30.2 G/DL (ref 31.4–37.4)
MCV RBC AUTO: 79 FL (ref 82–98)
MONOCYTES # BLD AUTO: 0.39 THOUSAND/ΜL (ref 0.17–1.22)
MONOCYTES NFR BLD AUTO: 10 % (ref 4–12)
NEUTROPHILS # BLD AUTO: 2.13 THOUSANDS/ΜL (ref 1.85–7.62)
NEUTS SEG NFR BLD AUTO: 53 % (ref 43–75)
NRBC BLD AUTO-RTO: 0 /100 WBCS
PLATELET # BLD AUTO: 238 THOUSANDS/UL (ref 149–390)
PMV BLD AUTO: 12.3 FL (ref 8.9–12.7)
POTASSIUM SERPL-SCNC: 4.1 MMOL/L (ref 3.5–5.3)
PROT SERPL-MCNC: 7.3 G/DL (ref 6.4–8.2)
RBC # BLD AUTO: 5.04 MILLION/UL (ref 3.81–5.12)
SODIUM SERPL-SCNC: 139 MMOL/L (ref 136–145)
T3 SERPL-MCNC: 2.6 NG/ML (ref 0.6–1.8)
T4 FREE SERPL-MCNC: 2.29 NG/DL (ref 0.76–1.46)
TSH SERPL DL<=0.05 MIU/L-ACNC: <0.007 UIU/ML (ref 0.36–3.74)
WBC # BLD AUTO: 3.94 THOUSAND/UL (ref 4.31–10.16)

## 2022-02-05 PROCEDURE — 84443 ASSAY THYROID STIM HORMONE: CPT

## 2022-02-05 PROCEDURE — 80053 COMPREHEN METABOLIC PANEL: CPT

## 2022-02-05 PROCEDURE — 84480 ASSAY TRIIODOTHYRONINE (T3): CPT

## 2022-02-05 PROCEDURE — 84439 ASSAY OF FREE THYROXINE: CPT

## 2022-02-05 PROCEDURE — 84445 ASSAY OF TSI GLOBULIN: CPT

## 2022-02-05 PROCEDURE — 86376 MICROSOMAL ANTIBODY EACH: CPT

## 2022-02-05 PROCEDURE — 83520 IMMUNOASSAY QUANT NOS NONAB: CPT

## 2022-02-05 PROCEDURE — 36415 COLL VENOUS BLD VENIPUNCTURE: CPT

## 2022-02-05 PROCEDURE — 86800 THYROGLOBULIN ANTIBODY: CPT

## 2022-02-05 PROCEDURE — 85025 COMPLETE CBC W/AUTO DIFF WBC: CPT

## 2022-02-07 ENCOUNTER — TELEPHONE (OUTPATIENT)
Dept: ENDOCRINOLOGY | Facility: CLINIC | Age: 37
End: 2022-02-07

## 2022-02-07 LAB — THYROPEROXIDASE AB SERPL-ACNC: 505 IU/ML (ref 0–34)

## 2022-02-07 NOTE — RESULT ENCOUNTER NOTE
Please call the patient regarding labs - free t4 higher then before - is she having any new symptoms since the office visit ?  Palpitations , tremors etc ?  Still waiting on some of the labs

## 2022-02-07 NOTE — TELEPHONE ENCOUNTER
Patient called back and she did say that she can't sleep, she feel she is on over drive and having palpitations  She is going for ultrasound on Thursday  She wanted to know if she should be concern

## 2022-02-07 NOTE — TELEPHONE ENCOUNTER
----- Message from Anne Holloway MD sent at 2/7/2022  1:30 PM EST -----  Please call the patient regarding labs - free t4 higher then before - is she having any new symptoms since the office visit ?  Palpitations , tremors etc ?  Still waiting on some of the labs

## 2022-02-08 LAB
THYROGLOB AB SERPL-ACNC: 3 IU/ML (ref 0–0.9)
TSI SER-ACNC: 0.22 IU/L (ref 0–0.55)

## 2022-02-08 NOTE — TELEPHONE ENCOUNTER
I am waiting on TSI - if that is positive , I will be starting methimazole   So when I saw her last week - her symptoms were better and she did have palpitations/heart racing - pulse was not high on visit   So did the symptoms get worse since then ?

## 2022-02-09 NOTE — RESULT ENCOUNTER NOTE
Please call the patient regarding labs -  tsi is negative , tpo and TG antibodies are positive so this could be thyroiditis ( inflammation ) or over overactive thyroid nodule   Please set up for thyroid uptake and scan   Also check with lab if they can add TRAB to labs done 4 days back    If she is having a lot of symptoms can start low dose beta blockers to help with palpitations

## 2022-02-10 ENCOUNTER — HOSPITAL ENCOUNTER (OUTPATIENT)
Dept: RADIOLOGY | Facility: MEDICAL CENTER | Age: 37
Discharge: HOME | End: 2022-02-10
Payer: COMMERCIAL

## 2022-02-10 ENCOUNTER — TELEPHONE (OUTPATIENT)
Dept: ENDOCRINOLOGY | Facility: CLINIC | Age: 37
End: 2022-02-10

## 2022-02-10 DIAGNOSIS — E04.2 MULTIPLE THYROID NODULES: Primary | ICD-10-CM

## 2022-02-10 DIAGNOSIS — E04.2 MULTIPLE THYROID NODULES: ICD-10-CM

## 2022-02-10 PROCEDURE — 76536 US EXAM OF HEAD AND NECK: CPT

## 2022-02-10 NOTE — TELEPHONE ENCOUNTER
----- Message from Caryn Carey MD sent at 2/9/2022  8:05 AM EST -----  Please call the patient regarding labs -  tsi is negative , tpo and TG antibodies are positive so this could be thyroiditis ( inflammation ) or over overactive thyroid nodule   Please set up for thyroid uptake and scan   Also check with lab if they can add TRAB to labs done 4 days back    If she is having a lot of symptoms can start low dose beta blockers to help with palpitations

## 2022-02-11 LAB — TSH RECEP AB SER-ACNC: <1.1 IU/L (ref 0–1.75)

## 2022-02-16 ENCOUNTER — TELEPHONE (OUTPATIENT)
Dept: ENDOCRINOLOGY | Facility: CLINIC | Age: 37
End: 2022-02-16

## 2022-02-16 ENCOUNTER — LAB (OUTPATIENT)
Dept: LAB | Facility: CLINIC | Age: 37
End: 2022-02-16
Payer: COMMERCIAL

## 2022-02-16 DIAGNOSIS — E05.90 HYPERTHYROIDISM: ICD-10-CM

## 2022-02-16 DIAGNOSIS — E05.90 HYPERTHYROIDISM: Primary | ICD-10-CM

## 2022-02-16 LAB
T3 SERPL-MCNC: 2.2 NG/ML (ref 0.6–1.8)
T4 FREE SERPL-MCNC: 2.37 NG/DL (ref 0.76–1.46)
TSH SERPL DL<=0.05 MIU/L-ACNC: <0.007 UIU/ML (ref 0.36–3.74)

## 2022-02-16 PROCEDURE — 84439 ASSAY OF FREE THYROXINE: CPT

## 2022-02-16 PROCEDURE — 84480 ASSAY TRIIODOTHYRONINE (T3): CPT

## 2022-02-16 PROCEDURE — 84443 ASSAY THYROID STIM HORMONE: CPT

## 2022-02-16 PROCEDURE — 36415 COLL VENOUS BLD VENIPUNCTURE: CPT

## 2022-02-21 ENCOUNTER — TELEPHONE (OUTPATIENT)
Dept: ENDOCRINOLOGY | Facility: CLINIC | Age: 37
End: 2022-02-21

## 2022-02-21 NOTE — TELEPHONE ENCOUNTER
----- Message from Ludy Joshi MD sent at 2/21/2022 10:31 AM EST -----  Please call the patient regarding labs -has upcoming thyroid uptake and scan

## 2022-03-02 ENCOUNTER — TELEPHONE (OUTPATIENT)
Dept: ENDOCRINOLOGY | Facility: CLINIC | Age: 37
End: 2022-03-02

## 2022-03-02 NOTE — TELEPHONE ENCOUNTER
She needs the scan first and after that will determine if she needs FNAB   She needs one f/u appointment -keep the appointment 3/10 and cancel the April    When she comes on 3/10 then she will be given the f/u appointment

## 2022-03-02 NOTE — TELEPHONE ENCOUNTER
Pt called today , she has an appointment tomorrow for her [facility]  [place] , She would like to know if she should schedule an appointment for her Thyroid Biopsy ,  If she does she would like to have an order in the system  So she can schedule and appointment , Also she has and appointment with Danita on 03/10/2022 and another Appointment on 04/22/2022 with you, she is not sure if she should came to both appointments, please advise

## 2022-03-03 ENCOUNTER — HOSPITAL ENCOUNTER (OUTPATIENT)
Dept: NUCLEAR MEDICINE | Facility: HOSPITAL | Age: 37
Discharge: HOME | End: 2022-03-03
Attending: INTERNAL MEDICINE
Payer: COMMERCIAL

## 2022-03-03 DIAGNOSIS — E04.2 MULTIPLE THYROID NODULES: ICD-10-CM

## 2022-03-03 PROCEDURE — A9516 IODINE I-123 SOD IODIDE MIC: HCPCS

## 2022-03-03 PROCEDURE — 78014 THYROID IMAGING W/BLOOD FLOW: CPT

## 2022-03-03 PROCEDURE — G1004 CDSM NDSC: HCPCS

## 2022-03-04 ENCOUNTER — HOSPITAL ENCOUNTER (OUTPATIENT)
Dept: NUCLEAR MEDICINE | Facility: HOSPITAL | Age: 37
Discharge: HOME | End: 2022-03-04
Attending: INTERNAL MEDICINE
Payer: COMMERCIAL

## 2022-03-08 ENCOUNTER — TELEPHONE (OUTPATIENT)
Dept: ENDOCRINOLOGY | Facility: CLINIC | Age: 37
End: 2022-03-08

## 2022-03-08 NOTE — TELEPHONE ENCOUNTER
Pt called and advised Dr Milton Ventura left her a message on 3/7 that she would like to speak to her  advised she was gone for the day  If she can call now that would be fine, if not she advised that she can call tomorrow between 8/9 am that would be good  She is a  so it is hard to answer her phone    She can be reached at [phone]

## 2022-03-09 NOTE — PROGRESS NOTES
Established Patient Progress Note      Chief Complaint   Patient presents with    Hyperthyroidism        History of Present Illness: Sisi Banda is a 40 y o  female with multiple thyroid nodules and hyperthyroidism  Last seen in the office 1/27/22  She had recent US (2/10/22) that showed 1 2 x 0 7 x 0 7 cm hypoechoic, solid left mid nodule, TI-RADS Score 4, meets criteria for ultrasound guided biopsy  She had recent thyroid uptake scan (3/4/22) showed normal and near symmetrical distribution of the radiopharmaceutical throughout both thyroid lobes without evidence of a discrete cold or hot nodule, findings suspicious of Grave's disease  Labs showed negative thyroid antibodies, undetected TSH, and elevated fT4 and T3  She is complaining of weight loss about 7-8 pounds in last few months, trouble sleeping, hair loss, occasional palpitations and higher resting HR in high 70s  She denies diarrhea, but is complaining of new right sided abdominal pain, occurring at random times, not related to what she eats, stabbing consistency         Patient Active Problem List   Diagnosis    Low TSH level    Multiple thyroid nodules    Abnormal thyroid screen (blood)    History of hyperthyroidism    Encounter for gynecological examination (general) (routine) without abnormal findings    Chronic idiopathic constipation    Costochondritis, acute    Iron deficiency anemia    Vitamin D deficiency    Gynecologic exam normal    Hyperthyroidism      Past Medical History:   Diagnosis Date    Amenorrhea     Contraceptive use     RESOLVED: [id number]    Disease of thyroid gland     stabilized no meds nodules    History of early menarche     AGE 13    Irregular menstrual cycle     LAST ASSESSED: [id number]    Spotting in pregnancy, antepartum condition or complication     RESOLVED: [id number]    Thyroid disease     Thyrotoxicosis 2009    Varicella     POS HX      Past Surgical History:   Procedure Laterality Date    US GUIDED THYROID BIOPSY  8/21/2020    benign    WISDOM TOOTH EXTRACTION        Family History   Problem Relation Age of Onset   Ebonie Burt Breast cancer Mother 36        dx in early 42's, BRCA negative    Thyroid disease Mother    [de-identified] / Sheldon Mother     Cancer Mother     Thyroid disease Maternal Grandmother     Hearing loss Maternal Grandfather     Hypertension Paternal Grandmother     Heart disease Paternal Grandmother     Diabetes Maternal Uncle     Hypertension Father     No Known Problems Daughter     No Known Problems Maternal Aunt      Social History     Tobacco Use    Smoking status: Never Smoker    Smokeless tobacco: Never Used   Substance Use Topics    Alcohol use: Yes     Comment: socially     No Known Allergies      Current Outpatient Medications:     calcium carbonate (OS-TONI) 600 MG tablet, Take 600 mg by mouth 2 (two) times a day with meals (Patient not taking: Reported on 3/10/2022 ), Disp: , Rfl:     multivitamin (THERAGRAN) TABS, Take 1 tablet by mouth daily (Patient not taking: Reported on 3/10/2022 ), Disp: , Rfl:     Review of Systems   Constitutional: Positive for appetite change (more hungry) and unexpected weight change  Negative for activity change, chills, fatigue and fever  HENT: Negative for sore throat, trouble swallowing and voice change  Pain in neck occasionally     Eyes: Negative for visual disturbance  Respiratory: Positive for shortness of breath (with exertion)  Negative for chest tightness  Cardiovascular: Positive for chest pain  Negative for palpitations and leg swelling  Gastrointestinal: Positive for abdominal pain (right side, stabbing pain, started this week)  Negative for constipation, diarrhea, nausea and vomiting  Endocrine: Negative for cold intolerance and heat intolerance  Neurological: Positive for weakness and light-headedness (occasionally)  Negative for dizziness, tremors, seizures, syncope, numbness and headaches  Psychiatric/Behavioral: Positive for sleep disturbance (not sleeping well)  The patient is not nervous/anxious  All other systems reviewed and are negative  Physical Exam:  Body mass index is 21 22 kg/m²  /88   Pulse 84   Ht 5' 4" (1 626 m)   Wt 56 1 kg (123 lb 9 6 oz)   BMI 21 22 kg/m²    Wt Readings from Last 3 Encounters:   03/10/22 56 1 kg (123 lb 9 6 oz)   01/27/22 59 1 kg (130 lb 3 2 oz)   11/05/21 58 5 kg (129 lb)       Physical Exam  Vitals reviewed  Constitutional:       General: She is not in acute distress  Appearance: Normal appearance  She is normal weight  She is not ill-appearing or diaphoretic  HENT:      Head: Normocephalic  Right Ear: External ear normal       Left Ear: External ear normal    Eyes:      Extraocular Movements: Extraocular movements intact  Conjunctiva/sclera: Conjunctivae normal       Pupils: Pupils are equal, round, and reactive to light  Cardiovascular:      Rate and Rhythm: Normal rate and regular rhythm  Pulses: Normal pulses  Heart sounds: Normal heart sounds  No murmur heard  No friction rub  No gallop  Pulmonary:      Effort: Pulmonary effort is normal  No respiratory distress  Breath sounds: Normal breath sounds  No stridor  No wheezing, rhonchi or rales  Abdominal:      General: Bowel sounds are normal  There is no distension  Palpations: Abdomen is soft  Tenderness: There is no abdominal tenderness  Musculoskeletal:         General: No swelling, tenderness or signs of injury  Normal range of motion  Cervical back: Normal range of motion and neck supple  Right lower leg: No edema  Left lower leg: No edema  Skin:     General: Skin is warm and dry  Coloration: Skin is not jaundiced  Findings: No bruising, erythema or rash  Neurological:      General: No focal deficit present  Mental Status: She is alert and oriented to person, place, and time        Sensory: No sensory deficit  Motor: No weakness  Coordination: Coordination normal       Gait: Gait normal    Psychiatric:         Mood and Affect: Mood normal          Behavior: Behavior normal          Thought Content: Thought content normal          Judgment: Judgment normal          Labs:   Lab Results   Component Value Date    HGBA1C 5 0 03/25/2017    HGBA1C 5 0 03/25/2017    HGBA1C 5 0 03/25/2017     Lab Results   Component Value Date    CREATININE 0 72 02/05/2022    CREATININE 0 93 07/14/2017    CREATININE 0 91 03/25/2017    BUN 7 02/05/2022     03/25/2017    K 4 1 02/05/2022     (H) 02/05/2022    CO2 26 02/05/2022     eGFR   Date Value Ref Range Status   02/05/2022 108 ml/min/1 73sq m Final     No results found for: CHOL, HDL, LDL, TRIG, CHOLHDL  Lab Results   Component Value Date    ALT 21 02/05/2022    AST 16 02/05/2022    ALKPHOS 54 02/05/2022    BILITOT 0 6 03/25/2017     Lab Results   Component Value Date    SIA4GIOKMMWI <0 007 (L) 02/16/2022    [id number] <0 007 (L) 02/05/2022    TWC0URBQDPER 0 665 06/20/2018     Lab Results   Component Value Date    FREET4 2 37 (H) 02/16/2022       Impression & Plan:    Problem List Items Addressed This Visit        Endocrine    Multiple thyroid nodules     Ordered US guided thyroid biopsy with affirma for left nodule meeting criteria for biopsy  Relevant Orders    US guided thyroid biopsy with AFIRMA    Hyperthyroidism - Primary     Symptoms and labs suggest hyperthyroidism  Advised to repeat TSH, fT4, and T3 since not done since uptake scan  Depending on labs, will most likely start methimazole  Advised to see PCP for abdominal pain, most likely not related to thyroid            Relevant Orders    T4, free Lab Collect    T3 Lab Collect    TSH, 3rd generation Lab Collect          Orders Placed This Encounter   Procedures    US guided thyroid biopsy with AFIRMA     Standing Status:   Future     Standing Expiration Date:   3/10/2026     Scheduling Instructions:      No prep required  Please bring your insurance cards, a form of photo ID and a list of your medications with you  Arrive 15 minutes prior to your appointment time in order to register  To schedule this appointment, please contact Central Scheduling at [phone]  Order Specific Question:   Is the patient pregnant? Answer:   No     Order Specific Question:   What is the patient's sedation requirement? Answer:   No Sedation     Order Specific Question:   Please specify laterality     Answer:   Left     Order Specific Question:   Please specify the number of nodules being biopsied     Answer:   1     Order Specific Question:   Describe each nodule location     Answer:   1 2 x 0 7 x 0 7 cm hypoechoic, solid left mid nodule    T4, free Lab Collect     Standing Status:   Future     Standing Expiration Date:   3/10/2023    T3 Lab Collect     Standing Status:   Future     Standing Expiration Date:   3/10/2023    TSH, 3rd generation Lab Collect     This is a patient instruction: This test is non-fasting  Please drink two glasses of water morning of bloodwork  Standing Status:   Future     Standing Expiration Date:   3/10/2023       There are no Patient Instructions on file for this visit  Discussed with the patient and all questioned fully answered  She will call me if any problems arise      MARSHAL Norton

## 2022-03-10 ENCOUNTER — OFFICE VISIT (OUTPATIENT)
Dept: ENDOCRINOLOGY | Facility: CLINIC | Age: 37
End: 2022-03-10
Payer: COMMERCIAL

## 2022-03-10 ENCOUNTER — TELEPHONE (OUTPATIENT)
Dept: ENDOCRINOLOGY | Facility: CLINIC | Age: 37
End: 2022-03-10

## 2022-03-10 VITALS
HEIGHT: 64 IN | DIASTOLIC BLOOD PRESSURE: 88 MMHG | HEART RATE: 84 BPM | WEIGHT: 123.6 LBS | SYSTOLIC BLOOD PRESSURE: 110 MMHG | BODY MASS INDEX: 21.1 KG/M2

## 2022-03-10 DIAGNOSIS — E04.2 MULTIPLE THYROID NODULES: ICD-10-CM

## 2022-03-10 DIAGNOSIS — E05.90 HYPERTHYROIDISM: Primary | ICD-10-CM

## 2022-03-10 PROCEDURE — 99214 OFFICE O/P EST MOD 30 MIN: CPT

## 2022-03-10 NOTE — ASSESSMENT & PLAN NOTE
Symptoms and labs suggest hyperthyroidism  Advised to repeat TSH, fT4, and T3 since not done since uptake scan  Depending on labs, will most likely start methimazole  Advised to see PCP for abdominal pain, most likely not related to thyroid

## 2022-03-10 NOTE — TELEPHONE ENCOUNTER
Pt has an appointment for her US guide thyroid biopsy with Finn on 03/17/2021 , When she made the appointment she was advise to call us to see if this is cover by her insurance   I will call pt's insurance

## 2022-03-11 ENCOUNTER — APPOINTMENT (OUTPATIENT)
Dept: LAB | Facility: CLINIC | Age: 37
End: 2022-03-11
Payer: COMMERCIAL

## 2022-03-11 DIAGNOSIS — E05.90 HYPERTHYROIDISM: ICD-10-CM

## 2022-03-11 LAB
T3 SERPL-MCNC: 2.8 NG/ML (ref 0.6–1.8)
T4 FREE SERPL-MCNC: 3.43 NG/DL (ref 0.76–1.46)
TSH SERPL DL<=0.05 MIU/L-ACNC: <0.007 UIU/ML (ref 0.36–3.74)

## 2022-03-11 PROCEDURE — 84480 ASSAY TRIIODOTHYRONINE (T3): CPT

## 2022-03-11 PROCEDURE — 36415 COLL VENOUS BLD VENIPUNCTURE: CPT

## 2022-03-11 PROCEDURE — 84439 ASSAY OF FREE THYROXINE: CPT

## 2022-03-11 PROCEDURE — 84443 ASSAY THYROID STIM HORMONE: CPT

## 2022-03-11 NOTE — TELEPHONE ENCOUNTER
Called [employer] per representative this test does not Pre-cert reference# [id number]    Called pt, detailed message left on VM

## 2022-03-14 ENCOUNTER — TELEPHONE (OUTPATIENT)
Dept: ENDOCRINOLOGY | Facility: CLINIC | Age: 37
End: 2022-03-14

## 2022-03-14 DIAGNOSIS — E05.90 HYPERTHYROIDISM: Primary | ICD-10-CM

## 2022-03-14 RX ORDER — METHIMAZOLE 10 MG/1
20 TABLET ORAL DAILY
Qty: 60 TABLET | Refills: 1 | Status: SHIPPED | OUTPATIENT
Start: 2022-03-14 | End: 2022-06-03 | Stop reason: SDUPTHER

## 2022-03-17 ENCOUNTER — TELEPHONE (OUTPATIENT)
Dept: ENDOCRINOLOGY | Facility: CLINIC | Age: 37
End: 2022-03-17

## 2022-03-17 DIAGNOSIS — E05.90 HYPERTHYROIDISM: Primary | ICD-10-CM

## 2022-03-17 NOTE — NURSING NOTE
Received a call back from patient to discuss upcoming ultrasound guided thyroid biopsy with Tyler at [facility]  Allergies reviewed and verified patient does not currently take any anticoagulant medications  Pre procedure instructions including diet and taking own medications discussed with patient  Patient instructed that she may eat normally and take medications as usual before the procedure  Patient verbalizes understanding  Patient had this procedure before and denies any questions at this time  Reminded of the location, date and time of the expected procedure  Name and contact number provided in case patient has any further questions

## 2022-03-17 NOTE — TELEPHONE ENCOUNTER
Called patient about broken vessel in eye  Explained to her most likely due to hyperthyroidism process itself and not side effect from methimazole  Advised her to see ophthalmology and put referral in

## 2022-03-17 NOTE — NURSING NOTE
Attempted to contact patient to discuss upcoming ultrasound guided thyroid biopsy with Tyler at [facility]  Message left

## 2022-03-17 NOTE — TELEPHONE ENCOUNTER
Pt called today, Pt woke up this morning  broken vessel in her eye , She is not sure is this is related with the new medication that she is taking Ordered on 03/14/2021 Methimazole

## 2022-03-23 ENCOUNTER — HOSPITAL ENCOUNTER (OUTPATIENT)
Dept: RADIOLOGY | Facility: HOSPITAL | Age: 37
Discharge: HOME | End: 2022-03-23
Payer: COMMERCIAL

## 2022-03-23 DIAGNOSIS — E04.2 MULTIPLE THYROID NODULES: ICD-10-CM

## 2022-03-23 PROCEDURE — 88173 CYTOPATH EVAL FNA REPORT: CPT | Performed by: PATHOLOGY

## 2022-03-23 PROCEDURE — 10005 FNA BX W/US GDN 1ST LES: CPT

## 2022-03-23 PROCEDURE — 88172 CYTP DX EVAL FNA 1ST EA SITE: CPT | Performed by: PATHOLOGY

## 2022-03-23 RX ORDER — LIDOCAINE HYDROCHLORIDE 10 MG/ML
5 INJECTION, SOLUTION EPIDURAL; INFILTRATION; INTRACAUDAL; PERINEURAL ONCE
Status: COMPLETED | OUTPATIENT
Start: 2022-03-23 | End: 2022-03-23

## 2022-03-23 RX ADMIN — LIDOCAINE HYDROCHLORIDE 5 ML: 10 INJECTION, SOLUTION EPIDURAL; INFILTRATION; INTRACAUDAL; PERINEURAL at 09:44

## 2022-03-29 ENCOUNTER — TELEPHONE (OUTPATIENT)
Dept: ENDOCRINOLOGY | Facility: CLINIC | Age: 37
End: 2022-03-29

## 2022-03-29 NOTE — TELEPHONE ENCOUNTER
Called patient and reviewed FNA results  Will call her when [facility]ILL results come back to discuss next steps

## 2022-04-01 ENCOUNTER — TELEPHONE (OUTPATIENT)
Dept: ENDOCRINOLOGY | Facility: CLINIC | Age: 37
End: 2022-04-01

## 2022-04-01 NOTE — TELEPHONE ENCOUNTER
Pt called c/o on and off skin rash  Sine she start taking the Methimazole , she would like to speak with you

## 2022-04-20 ENCOUNTER — APPOINTMENT (OUTPATIENT)
Dept: LAB | Facility: CLINIC | Age: 37
End: 2022-04-20
Payer: COMMERCIAL

## 2022-04-20 DIAGNOSIS — E05.90 HYPERTHYROIDISM: ICD-10-CM

## 2022-04-20 LAB
T3 SERPL-MCNC: 1.4 NG/ML (ref 0.6–1.8)
T4 FREE SERPL-MCNC: 1.23 NG/DL (ref 0.76–1.46)
TSH SERPL DL<=0.05 MIU/L-ACNC: <0.007 UIU/ML (ref 0.45–4.5)

## 2022-04-20 PROCEDURE — 84480 ASSAY TRIIODOTHYRONINE (T3): CPT

## 2022-04-20 PROCEDURE — 84443 ASSAY THYROID STIM HORMONE: CPT

## 2022-04-20 PROCEDURE — 36415 COLL VENOUS BLD VENIPUNCTURE: CPT

## 2022-04-20 PROCEDURE — 84439 ASSAY OF FREE THYROXINE: CPT

## 2022-05-11 ENCOUNTER — OFFICE VISIT (OUTPATIENT)
Dept: ENDOCRINOLOGY | Facility: CLINIC | Age: 37
End: 2022-05-11
Payer: COMMERCIAL

## 2022-05-11 VITALS
SYSTOLIC BLOOD PRESSURE: 92 MMHG | BODY MASS INDEX: 21.94 KG/M2 | WEIGHT: 128.5 LBS | HEART RATE: 70 BPM | DIASTOLIC BLOOD PRESSURE: 60 MMHG | HEIGHT: 64 IN

## 2022-05-11 DIAGNOSIS — E04.2 MULTIPLE THYROID NODULES: ICD-10-CM

## 2022-05-11 DIAGNOSIS — E05.90 HYPERTHYROIDISM: Primary | ICD-10-CM

## 2022-05-11 PROCEDURE — 99213 OFFICE O/P EST LOW 20 MIN: CPT

## 2022-05-11 RX ORDER — CLINDAMYCIN PHOSPHATE 10 UG/ML
LOTION TOPICAL AS NEEDED
COMMUNITY
Start: 2022-03-28

## 2022-05-11 NOTE — ASSESSMENT & PLAN NOTE
T3 and T4 have improved and now in normal range  Continue current dose of methimazole  Repeat labs in 3 months  Check CMP for liver function and CBC for WBC

## 2022-05-11 NOTE — PROGRESS NOTES
Established Patient Progress Note      Chief Complaint   Patient presents with    Hyperthyroidism        History of Present Illness: Chely Flores is a 40 y o  female with multiple thyroid nodules and hyperthyroidism  Last seen in the office 3/10/22  She had US (2/10/22) that showed 1 2 x 0 7 x 0 7 cm hypoechoic, solid left mid nodule, TI-RADS Score 4, meets criteria for ultrasound guided biopsy  FNA completed showed Bonneau Category 3 - AFIRMA results were benign  She had thyroid uptake scan (3/4/22) showed normal and near symmetrical distribution of the radiopharmaceutical throughout both thyroid lobes without evidence of a discrete cold or hot nodule, findings suspicious of Grave's disease  Labs showed negative thyroid antibodies, undetected TSH, and elevated fT4 and T3  She is currently taking methimazole 20 mg daily  Most recent labs - TSH undetectable, T3 normal, T4 normal  She initially had itching and rash when starting on the methimazole but reports it has resolved  She denies any sore throat, rash, or fever  She reports improvement in weight, anxiety and sleep  She now denies any palpitations or tremors  She overall feels well and back to herself         Patient Active Problem List   Diagnosis    Low TSH level    Multiple thyroid nodules    Abnormal thyroid screen (blood)    History of hyperthyroidism    Encounter for gynecological examination (general) (routine) without abnormal findings    Chronic idiopathic constipation    Costochondritis, acute    Iron deficiency anemia    Vitamin D deficiency    Gynecologic exam normal    Hyperthyroidism      Past Medical History:   Diagnosis Date    Amenorrhea     Contraceptive use     RESOLVED: [id number]    Disease of thyroid gland     stabilized no meds nodules    History of early menarche     AGE 13    Irregular menstrual cycle     LAST ASSESSED: [id number]    Spotting in pregnancy, antepartum condition or complication     RESOLVED: [id number]    Thyroid disease     Thyrotoxicosis 2009    Varicella     POS HX      Past Surgical History:   Procedure Laterality Date    US GUIDED THYROID BIOPSY  8/21/2020    benign    US GUIDED THYROID BIOPSY  3/23/2022    WISDOM TOOTH EXTRACTION        Family History   Problem Relation Age of Onset   Valarie Hopper Breast cancer Mother 36        dx in early 42's, BRCA negative    Thyroid disease Mother    [de-identified] / Sheldon Mother     Cancer Mother     Thyroid disease Maternal Grandmother     Hearing loss Maternal Grandfather     Hypertension Paternal Grandmother     Heart disease Paternal Grandmother     Diabetes Maternal Uncle     Hypertension Father     No Known Problems Daughter     No Known Problems Maternal Aunt      Social History     Tobacco Use    Smoking status: Never Smoker    Smokeless tobacco: Never Used   Substance Use Topics    Alcohol use: Yes     Comment: socially     No Known Allergies      Current Outpatient Medications:     calcium carbonate (OS-TONI) 600 MG tablet, Take 600 mg by mouth in the morning and 600 mg in the evening  Take with meals  , Disp: , Rfl:     methimazole (TAPAZOLE) 10 mg tablet, Take 2 tablets (20 mg total) by mouth in the morning, Disp: 60 tablet, Rfl: 1    multivitamin (THERAGRAN) TABS, Take 1 tablet by mouth in the morning , Disp: , Rfl:     clindamycin (CLEOCIN T) 1 % lotion, as needed, Disp: , Rfl:     Review of Systems   Constitutional: Positive for unexpected weight change (gained weight intentionally)  Negative for activity change, appetite change, chills, diaphoresis, fatigue and fever  HENT: Negative for sore throat, trouble swallowing and voice change  Eyes: Negative for visual disturbance  Respiratory: Negative for chest tightness and shortness of breath  Cardiovascular: Negative for chest pain, palpitations and leg swelling  Gastrointestinal: Negative for abdominal pain, constipation, diarrhea, nausea and vomiting     Endocrine: Negative for cold intolerance and heat intolerance  Musculoskeletal: Negative for arthralgias  Neurological: Negative for dizziness, tremors, weakness, light-headedness and numbness  All other systems reviewed and are negative  Physical Exam:  Body mass index is 22 06 kg/m²  BP 92/60   Pulse 70   Ht 5' 4" (1 626 m)   Wt 58 3 kg (128 lb 8 oz)   BMI 22 06 kg/m²    Wt Readings from Last 3 Encounters:   05/11/22 58 3 kg (128 lb 8 oz)   03/10/22 56 1 kg (123 lb 9 6 oz)   01/27/22 59 1 kg (130 lb 3 2 oz)       Physical Exam  Vitals reviewed  Constitutional:       Appearance: Normal appearance  She is normal weight  Eyes:      Extraocular Movements: Extraocular movements intact  Conjunctiva/sclera: Conjunctivae normal       Pupils: Pupils are equal, round, and reactive to light  Neck:      Thyroid: No thyroid tenderness  Cardiovascular:      Rate and Rhythm: Normal rate and regular rhythm  Pulses: Normal pulses  Heart sounds: Normal heart sounds  No murmur heard  Pulmonary:      Effort: Pulmonary effort is normal  No respiratory distress  Breath sounds: Normal breath sounds  No wheezing, rhonchi or rales  Musculoskeletal:      Cervical back: Normal range of motion and neck supple  No tenderness  Skin:     General: Skin is warm and dry  Neurological:      Mental Status: She is alert and oriented to person, place, and time  Psychiatric:         Mood and Affect: Mood normal          Behavior: Behavior normal          Thought Content:  Thought content normal          Judgment: Judgment normal          Labs:   Lab Results   Component Value Date    HGBA1C 5 0 03/25/2017    HGBA1C 5 0 03/25/2017    HGBA1C 5 0 03/25/2017     Lab Results   Component Value Date    CREATININE 0 72 02/05/2022    CREATININE 0 93 07/14/2017    CREATININE 0 91 03/25/2017    BUN 7 02/05/2022     03/25/2017    K 4 1 02/05/2022     (H) 02/05/2022    CO2 26 02/05/2022     eGFR   Date Value Ref Range Status   02/05/2022 108 ml/min/1 73sq m Final     No results found for: CHOL, HDL, LDL, TRIG, CHOLHDL  Lab Results   Component Value Date    ALT 21 02/05/2022    AST 16 02/05/2022    ALKPHOS 54 02/05/2022    BILITOT 0 6 03/25/2017     Lab Results   Component Value Date    [id number] <0 007 (L) 04/20/2022    [id number] <0 007 (L) 03/11/2022    [id number] <0 007 (L) 02/16/2022     Lab Results   Component Value Date    FREET4 1 23 04/20/2022       Impression & Plan:    Problem List Items Addressed This Visit        Endocrine    Multiple thyroid nodules     AFIRMA testing showed benign nodule  Will continue monitoring with yearly US  Due February 2023  Hyperthyroidism - Primary     T3 and T4 have improved and now in normal range  Continue current dose of methimazole  Repeat labs in 3 months  Check CMP for liver function and CBC for WBC  Relevant Orders    TSH, 3rd generation Lab Collect    T4, free Lab Collect    T3 Lab Collect    Comprehensive metabolic panel Lab Collect    CBC and differential Lab Collect          Orders Placed This Encounter   Procedures    TSH, 3rd generation Lab Collect     This is a patient instruction: This test is non-fasting  Please drink two glasses of water morning of bloodwork  Standing Status:   Future     Standing Expiration Date:   5/11/2023    T4, free Lab Collect     Standing Status:   Future     Standing Expiration Date:   5/11/2023    T3 Lab Collect     Standing Status:   Future     Standing Expiration Date:   5/11/2023    Comprehensive metabolic panel Lab Collect     This is a patient instruction: Patient fasting for 8 hours or longer recommended  Standing Status:   Future     Standing Expiration Date:   5/11/2023    CBC and differential Lab Collect     This is a patient instruction: This test is non-fasting  Please drink two glasses of water morning of bloodwork          Standing Status:   Future     Standing Expiration Date: 5/11/2023       There are no Patient Instructions on file for this visit  Discussed with the patient and all questioned fully answered  She will call me if any problems arise      MARSHAL Martinez

## 2022-06-03 DIAGNOSIS — E05.90 HYPERTHYROIDISM: ICD-10-CM

## 2022-06-03 RX ORDER — METHIMAZOLE 10 MG/1
20 TABLET ORAL DAILY
Qty: 60 TABLET | Refills: 2 | Status: SHIPPED | OUTPATIENT
Start: 2022-06-03

## 2022-06-07 ENCOUNTER — HOSPITAL ENCOUNTER (OUTPATIENT)
Dept: MAMMOGRAPHY | Facility: CLINIC | Age: 37
Discharge: HOME | End: 2022-06-07
Payer: COMMERCIAL

## 2022-06-07 ENCOUNTER — HOSPITAL ENCOUNTER (OUTPATIENT)
Dept: ULTRASOUND IMAGING | Facility: CLINIC | Age: 37
Discharge: HOME | End: 2022-06-07
Payer: COMMERCIAL

## 2022-06-07 DIAGNOSIS — R59.0 ENLARGED LYMPH NODES IN ARMPIT: ICD-10-CM

## 2022-06-07 PROCEDURE — 77066 DX MAMMO INCL CAD BI: CPT

## 2022-06-07 PROCEDURE — 76642 ULTRASOUND BREAST LIMITED: CPT

## 2022-06-07 PROCEDURE — G0279 TOMOSYNTHESIS, MAMMO: HCPCS

## 2022-08-01 ENCOUNTER — TELEPHONE (OUTPATIENT)
Dept: ENDOCRINOLOGY | Facility: CLINIC | Age: 37
End: 2022-08-01

## 2022-08-01 ENCOUNTER — APPOINTMENT (OUTPATIENT)
Dept: LAB | Facility: CLINIC | Age: 37
End: 2022-08-01
Payer: COMMERCIAL

## 2022-08-01 DIAGNOSIS — E05.90 HYPERTHYROIDISM: ICD-10-CM

## 2022-08-01 LAB
ALBUMIN SERPL BCP-MCNC: 4 G/DL (ref 3.5–5)
ALP SERPL-CCNC: 58 U/L (ref 34–104)
ALT SERPL W P-5'-P-CCNC: 20 U/L (ref 7–52)
ANION GAP SERPL CALCULATED.3IONS-SCNC: 4 MMOL/L (ref 4–13)
AST SERPL W P-5'-P-CCNC: 22 U/L (ref 13–39)
BASOPHILS # BLD AUTO: 0.01 THOUSANDS/ΜL (ref 0–0.1)
BASOPHILS NFR BLD AUTO: 0 % (ref 0–1)
BILIRUB SERPL-MCNC: 0.53 MG/DL (ref 0.2–1)
BUN SERPL-MCNC: 11 MG/DL (ref 5–25)
CALCIUM SERPL-MCNC: 9.2 MG/DL (ref 8.4–10.2)
CHLORIDE SERPL-SCNC: 106 MMOL/L (ref 96–108)
CO2 SERPL-SCNC: 27 MMOL/L (ref 21–32)
CREAT SERPL-MCNC: 0.77 MG/DL (ref 0.6–1.3)
EOSINOPHIL # BLD AUTO: 0 THOUSAND/ΜL (ref 0–0.61)
EOSINOPHIL NFR BLD AUTO: 0 % (ref 0–6)
ERYTHROCYTE [DISTWIDTH] IN BLOOD BY AUTOMATED COUNT: 15.6 % (ref 11.6–15.1)
GFR SERPL CREATININE-BSD FRML MDRD: 99 ML/MIN/1.73SQ M
GLUCOSE P FAST SERPL-MCNC: 86 MG/DL (ref 65–99)
HCT VFR BLD AUTO: 39.8 % (ref 34.8–46.1)
HGB BLD-MCNC: 11.6 G/DL (ref 11.5–15.4)
IMM GRANULOCYTES # BLD AUTO: 0.01 THOUSAND/UL (ref 0–0.2)
IMM GRANULOCYTES NFR BLD AUTO: 0 % (ref 0–2)
LYMPHOCYTES # BLD AUTO: 1.68 THOUSANDS/ΜL (ref 0.6–4.47)
LYMPHOCYTES NFR BLD AUTO: 41 % (ref 14–44)
MCH RBC QN AUTO: 22 PG (ref 26.8–34.3)
MCHC RBC AUTO-ENTMCNC: 29.1 G/DL (ref 31.4–37.4)
MCV RBC AUTO: 75 FL (ref 82–98)
MONOCYTES # BLD AUTO: 0.41 THOUSAND/ΜL (ref 0.17–1.22)
MONOCYTES NFR BLD AUTO: 10 % (ref 4–12)
NEUTROPHILS # BLD AUTO: 2.04 THOUSANDS/ΜL (ref 1.85–7.62)
NEUTS SEG NFR BLD AUTO: 49 % (ref 43–75)
NRBC BLD AUTO-RTO: 0 /100 WBCS
PLATELET # BLD AUTO: 231 THOUSANDS/UL (ref 149–390)
PMV BLD AUTO: 11 FL (ref 8.9–12.7)
POTASSIUM SERPL-SCNC: 4.2 MMOL/L (ref 3.5–5.3)
PROT SERPL-MCNC: 6.9 G/DL (ref 6.4–8.4)
RBC # BLD AUTO: 5.28 MILLION/UL (ref 3.81–5.12)
SODIUM SERPL-SCNC: 137 MMOL/L (ref 135–147)
T4 FREE SERPL-MCNC: 0.93 NG/DL (ref 0.76–1.46)
TSH SERPL DL<=0.05 MIU/L-ACNC: 0.01 UIU/ML (ref 0.45–4.5)
WBC # BLD AUTO: 4.15 THOUSAND/UL (ref 4.31–10.16)

## 2022-08-01 PROCEDURE — 84480 ASSAY TRIIODOTHYRONINE (T3): CPT

## 2022-08-01 PROCEDURE — 80053 COMPREHEN METABOLIC PANEL: CPT

## 2022-08-01 PROCEDURE — 85025 COMPLETE CBC W/AUTO DIFF WBC: CPT

## 2022-08-01 PROCEDURE — 84443 ASSAY THYROID STIM HORMONE: CPT

## 2022-08-01 PROCEDURE — 84439 ASSAY OF FREE THYROXINE: CPT

## 2022-08-01 PROCEDURE — 36415 COLL VENOUS BLD VENIPUNCTURE: CPT

## 2022-08-01 NOTE — TELEPHONE ENCOUNTER
----- Message from [facility], [address] sent at 8/1/2022  1:15 PM EDT -----  Labs look good - can discuss in  more detail at upcoming appointment

## 2022-08-02 LAB — T3 SERPL-MCNC: 1.1 NG/ML (ref 0.6–1.8)

## 2022-08-15 ENCOUNTER — OFFICE VISIT (OUTPATIENT)
Dept: ENDOCRINOLOGY | Facility: CLINIC | Age: 37
End: 2022-08-15
Payer: COMMERCIAL

## 2022-08-15 VITALS
DIASTOLIC BLOOD PRESSURE: 80 MMHG | SYSTOLIC BLOOD PRESSURE: 124 MMHG | HEART RATE: 68 BPM | WEIGHT: 131.4 LBS | BODY MASS INDEX: 22.43 KG/M2 | HEIGHT: 64 IN

## 2022-08-15 DIAGNOSIS — E05.90 HYPERTHYROIDISM: Primary | ICD-10-CM

## 2022-08-15 DIAGNOSIS — E04.2 MULTIPLE THYROID NODULES: ICD-10-CM

## 2022-08-15 PROCEDURE — 99213 OFFICE O/P EST LOW 20 MIN: CPT

## 2022-08-15 NOTE — PROGRESS NOTES
Established Patient Progress Note      Chief Complaint   Patient presents with    Hyperthyroidism        History of Present Illness: Grace Hall is a 40 y o  female with multiple thyroid nodules and hyperthyroidism  Last seen in the office 5/11/22  She had US (2/10/22) that showed 1 2 x 0 7 x 0 7 cm hypoechoic, solid left mid nodule, TI-RADS Score 4, meets criteria for ultrasound guided biopsy  FNA completed showed [place] Category 3 - AFIRMA results were benign  She had thyroid uptake scan (3/4/22) showed normal and near symmetrical distribution of the radiopharmaceutical throughout both thyroid lobes without evidence of a discrete cold or hot nodule, findings suspicious of Grave's disease  Labs showed negative thyroid antibodies, undetected TSH, and elevated fT4 and T3  She is currently taking methimazole 20 mg daily  Most recent labs - TSH 0 011, T3 & T4 normal  She initially had itching and rash when starting on the methimazole but reports it has resolved  She denies any sore throat, rash, or fever  She reports improvement in weight, anxiety and sleep  She now denies any palpitations or tremors  She overall feels well and back to herself         Patient Active Problem List   Diagnosis    Low TSH level    Multiple thyroid nodules    Abnormal thyroid screen (blood)    History of hyperthyroidism    Encounter for gynecological examination (general) (routine) without abnormal findings    Chronic idiopathic constipation    Costochondritis, acute    Iron deficiency anemia    Vitamin D deficiency    Gynecologic exam normal    Hyperthyroidism      Past Medical History:   Diagnosis Date    Amenorrhea     Contraceptive use     RESOLVED: [id number]    Disease of thyroid gland     stabilized no meds nodules    History of early menarche     AGE 13    Irregular menstrual cycle     LAST ASSESSED: [id number]    Spotting in pregnancy, antepartum condition or complication     RESOLVED: [id number]    Thyroid disease     Thyrotoxicosis 2009    Varicella     POS HX      Past Surgical History:   Procedure Laterality Date    US GUIDED THYROID BIOPSY  8/21/2020    benign    US GUIDED THYROID BIOPSY  3/23/2022    WISDOM TOOTH EXTRACTION        Family History   Problem Relation Age of Onset   [facility] Breast cancer Mother 36        dx in early 42's, BRCA negative    Thyroid disease Mother    [de-identified] / Vianeyi Mother     Cancer Mother     Thyroid disease Maternal Grandmother     Hearing loss Maternal Grandfather     Hypertension Paternal Grandmother     Heart disease Paternal Grandmother     Diabetes Maternal Uncle     Hypertension Father     No Known Problems Daughter     No Known Problems Maternal Aunt      Social History     Tobacco Use    Smoking status: Never Smoker    Smokeless tobacco: Never Used   Substance Use Topics    Alcohol use: Yes     Comment: socially     No Known Allergies      Current Outpatient Medications:     calcium carbonate (OS-TONI) 600 MG tablet, Take 600 mg by mouth in the morning and 600 mg in the evening  Take with meals  , Disp: , Rfl:     clindamycin (CLEOCIN T) 1 % lotion, as needed, Disp: , Rfl:     methimazole (TAPAZOLE) 10 mg tablet, Take 2 tablets (20 mg total) by mouth in the morning, Disp: 60 tablet, Rfl: 2    multivitamin (THERAGRAN) TABS, Take 1 tablet by mouth in the morning , Disp: , Rfl:     Review of Systems   Constitutional: Negative for activity change, appetite change, chills, diaphoresis, fatigue, fever and unexpected weight change  HENT: Negative for sore throat, trouble swallowing and voice change  Eyes: Negative for visual disturbance  Respiratory: Negative for chest tightness and shortness of breath  Cardiovascular: Negative for chest pain, palpitations and leg swelling  Gastrointestinal: Negative for abdominal pain, diarrhea, nausea and vomiting  Endocrine: Negative for cold intolerance and heat intolerance     Skin: Negative for color change, pallor, rash and wound  Neurological: Negative for dizziness, tremors, weakness, light-headedness and numbness  All other systems reviewed and are negative  Physical Exam:  Body mass index is 22 55 kg/m²  /80   Pulse 68   Ht 5' 4" (1 626 m)   Wt 59 6 kg (131 lb 6 4 oz)   BMI 22 55 kg/m²    Wt Readings from Last 3 Encounters:   08/15/22 59 6 kg (131 lb 6 4 oz)   05/11/22 58 3 kg (128 lb 8 oz)   03/10/22 56 1 kg (123 lb 9 6 oz)       Physical Exam  Vitals reviewed  Constitutional:       Appearance: Normal appearance  HENT:      Head: Normocephalic  Cardiovascular:      Rate and Rhythm: Normal rate and regular rhythm  Pulses: Normal pulses  Heart sounds: Normal heart sounds  No murmur heard  Pulmonary:      Effort: No respiratory distress  Breath sounds: Normal breath sounds  No wheezing, rhonchi or rales  Neurological:      Mental Status: She is alert and oriented to person, place, and time  Psychiatric:         Mood and Affect: Mood normal          Behavior: Behavior normal          Thought Content:  Thought content normal          Judgment: Judgment normal          Labs:   Lab Results   Component Value Date    HGBA1C 5 0 03/25/2017    HGBA1C 5 0 03/25/2017    HGBA1C 5 0 03/25/2017     Lab Results   Component Value Date    CREATININE 0 77 08/01/2022    CREATININE 0 72 02/05/2022    CREATININE 0 93 07/14/2017    BUN 11 08/01/2022     03/25/2017    K 4 2 08/01/2022     08/01/2022    CO2 27 08/01/2022     eGFR   Date Value Ref Range Status   08/01/2022 99 ml/min/1 73sq m Final     Lab Results   Component Value Date    ALT 20 08/01/2022    AST 22 08/01/2022    ALKPHOS 58 08/01/2022    BILITOT 0 6 03/25/2017     Lab Results   Component Value Date    [id number] 0 011 (L) 08/01/2022    NCB8IUGOQMBJ <0 007 (L) 04/20/2022    TZQ4RVIUJOBX <0 007 (L) 03/11/2022     Lab Results   Component Value Date    FREET4 0 93 08/01/2022       Impression & Plan:    Problem List Items Addressed This Visit        Endocrine    Multiple thyroid nodules     Repeat yearly thyroid ultrasound  This will be due in February  Relevant Orders    US thyroid    Hyperthyroidism - Primary     TSH improving, T3 and T4 in normal range  She feels well overall  Advised her to continue current dose of methimazole  We will repeat labs in 3 months  Reviewed symptoms of hypothyroidism and advised her to call if she is experiencing any  She is unsure if she is planning pregnancy in the future  Advised her to call office right away if she becomes pregnant  Relevant Orders    TSH, 3rd generation    T4, free    T3          Orders Placed This Encounter   Procedures    US thyroid     Standing Status:   Future     Standing Expiration Date:   8/15/2026     Scheduling Instructions:      No prep required  Please bring your insurance cards, a form of photo ID and a list of your medications with you  Arrive 15 minutes prior to your appointment time in order to register  To schedule this appointment, please contact Central Scheduling at [phone]   TSH, 3rd generation     This is a patient instruction: This test is non-fasting  Please drink two glasses of water morning of bloodwork  Standing Status:   Future     Standing Expiration Date:   8/15/2023    T4, free     Standing Status:   Future     Standing Expiration Date:   8/15/2023    T3     Standing Status:   Future     Standing Expiration Date:   8/15/2023       Patient Instructions   Repeat thyroid labs in 3 months  Thyroid US in 6 months       Discussed with the patient and all questioned fully answered  She will call me if any problems arise      MARSHAL Dykes

## 2022-08-15 NOTE — ASSESSMENT & PLAN NOTE
TSH improving, T3 and T4 in normal range  She feels well overall  Advised her to continue current dose of methimazole  We will repeat labs in 3 months  Reviewed symptoms of hypothyroidism and advised her to call if she is experiencing any  She is unsure if she is planning pregnancy in the future  Advised her to call office right away if she becomes pregnant

## 2022-08-22 ENCOUNTER — TELEPHONE (OUTPATIENT)
Dept: ENDOCRINOLOGY | Facility: CLINIC | Age: 37
End: 2022-08-22

## 2022-09-11 DIAGNOSIS — E05.90 HYPERTHYROIDISM: ICD-10-CM

## 2022-09-11 RX ORDER — METHIMAZOLE 10 MG/1
TABLET ORAL
Qty: 60 TABLET | Refills: 2 | Status: SHIPPED | OUTPATIENT
Start: 2022-09-11

## 2022-10-12 PROBLEM — Z01.419 GYNECOLOGIC EXAM NORMAL: Status: RESOLVED | Noted: 2021-11-04 | Resolved: 2022-10-12

## 2022-10-20 NOTE — PROGRESS NOTES
[facility] - Outpatient Consultation  Abhijeet Olea 40 y o  female MRN: [id number]  Encounter: [id number]          ASSESSMENT AND PLAN:      Mikki Mckeon is a 39 y/o female with chronic idiopathic constipation, hyperthyroidism who presents for consultation of abdominal pain  1  Chronic &  intermittent RUQ Pain  Pt complains of intermittent RUQ Pain since she gave birth to her daughter in 2020; she says she has weeks when she does not feel the pain, and she has some weeks when she feels it daily  She says it is not related to any identifiable triggers as it occurs at random and alleviates on its own a bit after  Pt underwent CT 12/2020 when the first first started and this was WNL; RUQ u/s 3/2022 was also normal and without GB/biliary abnormalities  Pt says she would like to avoid medications until work-up is complete  -EGD w bx ordered to rule out H pylori, PUD, Hiatal hernia  -HIDA scan ordered as her pain started after delivery and does occur intermittently, so I would like to rule out dyskinesia  -I explained to pt that this pain may also be functional in nature so we can discuss symptomatic management if her work-up is WNL    2  Chronic & intermittent constipation  3  Rectal Pain and itching   Pt says that she gets intermittent constipation every few weeks or so, but she does use an OTC laxative to help with this  She however notes that when she was on her period last, she was constipated and she was getting rectal pain and itching that has since resolved  She did notice some BRB with wiping, as well, but this has not occurred since  JUSTINE deferred as pt notes she would like to proceed with colonoscopy     -I explained to pt that making sure her constipation is under control in order to avoid further irritation/pain; I also explained that her pain/itching sounds hemorrhoidal in nature however we will be able to further evaluate with colonoscopy   -colonoscopy ordered; instructions given; risks and benefits reviewed  -SITZ bath  -start anusol suppository   -continue OTC laxative PRN however I asked her to make sure that she does not go >24 hrs without a BM  -follow-up with endocrinology for hyperthyroid   ______________________________________________________________________    HPI:  Yoav Campos is a 39 y/o female with chronic idiopathic constipation, hyperthyroidism who presents for consultation of abdominal pain  Pt says that since she gave birth to her daughter in 2020, she has been getting RUQ pain without radiation  She says she can go weeks without feeling it, then it will abruptly come for another few weeks  She says the pain is not constant as it comes and goes, however she says she has not been able to identify any triggers, including eating/drinking, BMs, movement or activity  Pt denies heartburn or reflux, n/v, NSAID use, dysphagia, lower abdominal pain  Pt also notes that when she is not taking her thyroid meds, she has diarrhea  Otherwise, when she is taking her thyroid meds, she is at her baseline with intermittent constipation  She says the constipation does not occur weekly, rather she will skip a day or two every few weeks  She says she does believe she has hemorrhoids as she experienced rectal pain and itching with BRB with wiping only during her last period  She does admit to being constipated during this time  Pt notes this has since resolved  Pt says she uses an OTC laxative that helps  Pt denies family hx of colon cancer, unintentional weight loss, fevers, chills, night sweats, blood mixed in stool, black/tarry BMs  Pt is not on blood thinners  Pt denies prior abdominal surgical hx  REVIEW OF SYSTEMS:    CONSTITUTIONAL: Denies any fever, chills, rigors, and weight loss  HEENT: No earache or tinnitus  Denies hearing loss or visual disturbances  CARDIOVASCULAR: No chest pain or palpitations     RESPIRATORY: Denies any cough, hemoptysis, shortness of breath or dyspnea on exertion  GASTROINTESTINAL: As noted in the History of Present Illness  GENITOURINARY: No problems with urination  Denies any hematuria or dysuria  NEUROLOGIC: No dizziness or vertigo, denies headaches  MUSCULOSKELETAL: Denies any muscle or joint pain  SKIN: Denies skin rashes or itching  ENDOCRINE: Denies excessive thirst  Denies intolerance to heat or cold  PSYCHOSOCIAL: Denies depression or anxiety  Denies any recent memory loss         Historical Information   Past Medical History:   Diagnosis Date   • Amenorrhea    • Contraceptive use     RESOLVED: [id number]   • Disease of thyroid gland     stabilized no meds nodules   • History of early menarche     AGE 13   • Irregular menstrual cycle     LAST ASSESSED: [id number]   • Spotting in pregnancy, antepartum condition or complication     RESOLVED: [id number]   • Thyroid disease    • Thyrotoxicosis 2009   • Varicella     POS HX     Past Surgical History:   Procedure Laterality Date   • US GUIDED THYROID BIOPSY  8/21/2020    benign   • US GUIDED THYROID BIOPSY  3/23/2022   • WISDOM TOOTH EXTRACTION       Social History   Social History     Substance and Sexual Activity   Alcohol Use Yes    Comment: socially     Social History     Substance and Sexual Activity   Drug Use No     Social History     Tobacco Use   Smoking Status Never Smoker   Smokeless Tobacco Never Used     Family History   Problem Relation Age of Onset   • Breast cancer Mother 36        dx in early 42's, BRCA negative   • Thyroid disease Mother    • Miscarriages / Vickii Patient Mother    • Cancer Mother    • Thyroid disease Maternal Grandmother    • Hearing loss Maternal Grandfather    • Hypertension Paternal Grandmother    • Heart disease Paternal Grandmother    • Diabetes Maternal Uncle    • Hypertension Father    • No Known Problems Daughter    • No Known Problems Maternal Aunt        Meds/Allergies       Current Outpatient Medications:   •  calcium carbonate (OS-TONI) 600 MG tablet  • clindamycin (CLEOCIN T) 1 % lotion  •  methimazole (TAPAZOLE) 10 mg tablet  •  multivitamin (THERAGRAN) TABS    No Known Allergies        Objective     not currently breastfeeding  There is no height or weight on file to calculate BMI  PHYSICAL EXAM:      General Appearance:   Alert, cooperative, no distress   HEENT:   Normocephalic, atraumatic, anicteric      Neck:  Supple, symmetrical, trachea midline   Lungs:   Clear to auscultation bilaterally; no rales, rhonchi or wheezing; respirations unlabored    Heart[de-identified]   Regular rate and rhythm; no murmur, rub, or gallop  Abdomen:   Soft, non-tender, non-distended; normal bowel sounds; no masses, no organomegaly    Genitalia:   Deferred    Rectal:   Deferred    Extremities:  No cyanosis, clubbing or edema    Pulses:  2+ and symmetric    Skin:  No jaundice, rashes, or lesions    Lymph nodes:  No palpable cervical lymphadenopathy        Lab Results:   No visits with results within 1 Day(s) from this visit     Latest known visit with results is:   Appointment on 08/01/2022   Component Date Value   • TSH 3RD GENERATON 08/01/2022 0 011 (A)   • Free T4 08/01/2022 0 93    • T3, Total 08/01/2022 1 10    • Sodium 08/01/2022 137    • Potassium 08/01/2022 4 2    • Chloride 08/01/2022 106    • CO2 08/01/2022 27    • ANION GAP 08/01/2022 4    • BUN 08/01/2022 11    • Creatinine 08/01/2022 0 77    • Glucose, Fasting 08/01/2022 86    • Calcium 08/01/2022 9 2    • AST 08/01/2022 22    • ALT 08/01/2022 20    • Alkaline Phosphatase 08/01/2022 58    • Total Protein 08/01/2022 6 9    • Albumin 08/01/2022 4 0    • Total Bilirubin 08/01/2022 0 53    • eGFR 08/01/2022 99    • WBC 08/01/2022 4 15 (A)   • RBC 08/01/2022 5 28 (A)   • Hemoglobin 08/01/2022 11 6    • Hematocrit 08/01/2022 39 8    • MCV 08/01/2022 75 (A)   • MCH 08/01/2022 22 0 (A)   • MCHC 08/01/2022 29 1 (A)   • RDW 08/01/2022 15 6 (A)   • MPV 08/01/2022 11 0    • Platelets 82/30/5889 231    • nRBC 08/01/2022 0    • Neutrophils Relative 08/01/2022 49    • Immat GRANS % 08/01/2022 0    • Lymphocytes Relative 08/01/2022 41    • Monocytes Relative 08/01/2022 10    • Eosinophils Relative 08/01/2022 0    • Basophils Relative 08/01/2022 0    • Neutrophils Absolute 08/01/2022 2 04    • Immature Grans Absolute 08/01/2022 0 01    • Lymphocytes Absolute 08/01/2022 1 68    • Monocytes Absolute 08/01/2022 0 41    • Eosinophils Absolute 08/01/2022 0 00    • Basophils Absolute 08/01/2022 0 01          Radiology Results:   No results found

## 2022-10-21 ENCOUNTER — CONSULT (OUTPATIENT)
Dept: GASTROENTEROLOGY | Facility: CLINIC | Age: 37
End: 2022-10-21
Payer: COMMERCIAL

## 2022-10-21 VITALS
HEIGHT: 64 IN | BODY MASS INDEX: 22.53 KG/M2 | WEIGHT: 132 LBS | TEMPERATURE: 97.8 F | DIASTOLIC BLOOD PRESSURE: 60 MMHG | SYSTOLIC BLOOD PRESSURE: 118 MMHG

## 2022-10-21 DIAGNOSIS — K62.5 RECTAL BLEED: ICD-10-CM

## 2022-10-21 DIAGNOSIS — K64.9 HEMORRHOIDS, UNSPECIFIED HEMORRHOID TYPE: ICD-10-CM

## 2022-10-21 DIAGNOSIS — R10.11 RUQ PAIN: ICD-10-CM

## 2022-10-21 DIAGNOSIS — K62.89 RECTAL PAIN: Primary | ICD-10-CM

## 2022-10-21 DIAGNOSIS — K59.00 CONSTIPATION, UNSPECIFIED CONSTIPATION TYPE: ICD-10-CM

## 2022-10-21 PROCEDURE — 99244 OFF/OP CNSLTJ NEW/EST MOD 40: CPT | Performed by: PHYSICIAN ASSISTANT

## 2022-10-21 RX ORDER — POLYETHYLENE GLYCOL 3350 17 G/17G
17 POWDER, FOR SOLUTION ORAL DAILY
Qty: 100 EACH | Refills: 2 | Status: SHIPPED | OUTPATIENT
Start: 2022-10-21

## 2022-10-21 RX ORDER — HYDROCORTISONE ACETATE 25 MG/1
25 SUPPOSITORY RECTAL 2 TIMES DAILY
Qty: 30 SUPPOSITORY | Refills: 2 | Status: SHIPPED | OUTPATIENT
Start: 2022-10-21 | End: 2022-10-25

## 2022-10-21 NOTE — PATIENT INSTRUCTIONS
Sitz Bath   WHAT YOU NEED TO KNOW:   What is a sitz bath? A sitz bath is when you soak in a warm or hot water tub to promote wound healing  It is often done after surgeries on the rectal or genital area  The heat from the water will clean the area, improve blood flow for healing, and decrease swelling and pain  What supplies are needed for a sitz bath? A bathtub thermometer to check the water temperature    Towels to cover your upper body during the bath and to dry off after    A footstool to help you enter the bathtub if needed    Bath mats to prevent slips in the tub and after you get out of the tub    Clean clothes to wear after the bath    How do I prepare the sitz bath? Fill the bathtub with water until it is at about the level of your belly button  Check the temperature of the water before you get in  For a warm water sitz bath, the water should be 94° to 98° Fahrenheit  A hot water sitz bath should be between 105° to 110° Fahrenheit  Stay in the bath for about 15 to 20 minutes  What if I cannot get in and out of the bathtub? You may be sent home with a portable sitz bath  This is a small tub that will fit under your toilet seat  You will fill the tub with water as directed once it is under the toilet seat  Check the temperature of the water  You will also have a squirt bottle or water bag filled with the warm water  These are used to let the water flow out over the wound area during the sitz bath  This is also done for 15 to 20 minutes  What else do I need to know about sitz baths? You may have some discomfort at first when you sit in the warm water  This should go away shortly after entering the tub  Check the water temperature a few times during the bath  You may need to add more water to keep it at the right temperature  Take a sitz bath when someone is close by to help you if needed  The heat from the water may cause you to feel weak or lightheaded   If this happens, call for help to get out of the tub  Do not stand up on your own  in case you lose your balance  CARE AGREEMENT:   You have the right to help plan your care  Learn about your health condition and how it may be treated  Discuss treatment options with your healthcare providers to decide what care you want to receive  You always have the right to refuse treatment  The above information is an  only  It is not intended as medical advice for individual conditions or treatments  Talk to your doctor, nurse or pharmacist before following any medical regimen to see if it is safe and effective for you  © Copyright [employer] 2022 Information is for End User's use only and may not be sold, redistributed or otherwise used for commercial purposes  All illustrations and images included in CareNotes® are the copyrighted property of A D A M , Inc  or [facility]olanta     Scheduled date of EGD/colonoscopy (as of today):  12/30/22  Physician performing EGD/colonoscopy:  Dr Adelita Russell  Location of EGD/colonoscopy:   [facility]  Desired bowel prep reviewed with patient:  Golytely  Instructions reviewed with patient by: Alberta Lawson  Clearances:   n/a

## 2022-10-24 ENCOUNTER — TELEPHONE (OUTPATIENT)
Dept: OBGYN CLINIC | Facility: CLINIC | Age: 37
End: 2022-10-24

## 2022-10-24 DIAGNOSIS — Z20.2 POTENTIAL EXPOSURE TO STD: Primary | ICD-10-CM

## 2022-10-24 DIAGNOSIS — N89.8 VAGINAL DISCHARGE: Primary | ICD-10-CM

## 2022-10-24 DIAGNOSIS — R10.2 PELVIC PAIN: ICD-10-CM

## 2022-10-24 RX ORDER — METRONIDAZOLE 500 MG/1
500 TABLET ORAL EVERY 12 HOURS SCHEDULED
Qty: 14 TABLET | Refills: 0 | Status: SHIPPED | OUTPATIENT
Start: 2022-10-24 | End: 2022-10-25 | Stop reason: ALTCHOICE

## 2022-10-24 NOTE — TELEPHONE ENCOUNTER
Pt lmom - experiencing a little bit of pain in uterine area, thinks might have had a yeast infection and didn't do anything about it and not sure if got worse   Hoping to possibly get in today to see somebody

## 2022-10-24 NOTE — TELEPHONE ENCOUNTER
Pt said she is having back pain, pelvic pain, green d/c, musty odor, itching, burning and feels feverish and a loss of appetite  Spoke to oncall provider since we dont have any appts to offer patient  On call provider said patient should check her temperature and if she has a fever to go to urgent care, put labs in for patient to complete and flagyl  Told patient she can call to see if we have any cancelations kristina  Morning to get her in

## 2022-10-25 ENCOUNTER — OFFICE VISIT (OUTPATIENT)
Dept: OBGYN CLINIC | Facility: CLINIC | Age: 37
End: 2022-10-25
Payer: COMMERCIAL

## 2022-10-25 VITALS
WEIGHT: 132.6 LBS | SYSTOLIC BLOOD PRESSURE: 112 MMHG | DIASTOLIC BLOOD PRESSURE: 72 MMHG | BODY MASS INDEX: 22.64 KG/M2 | HEIGHT: 64 IN

## 2022-10-25 DIAGNOSIS — N89.8 VAGINAL DISCHARGE: Primary | ICD-10-CM

## 2022-10-25 LAB
BV WHIFF TEST VAG QL: ABNORMAL
CLUE CELLS SPEC QL WET PREP: ABNORMAL
PH SMN: 3.5 [PH]
SL AMB  POCT GLUCOSE, UA: ABNORMAL
SL AMB LEUKOCYTE ESTERASE,UA: ABNORMAL
SL AMB POCT BILIRUBIN,UA: ABNORMAL
SL AMB POCT BLOOD,UA: ABNORMAL
SL AMB POCT CLARITY,UA: ABNORMAL
SL AMB POCT COLOR,UA: YELLOW
SL AMB POCT KETONES,UA: ABNORMAL
SL AMB POCT NITRITE,UA: ABNORMAL
SL AMB POCT PH,UA: 6
SL AMB POCT SPECIFIC GRAVITY,UA: 1.02
SL AMB POCT URINE PROTEIN: ABNORMAL
SL AMB POCT UROBILINOGEN: ABNORMAL
SL AMB POCT WET MOUNT: ABNORMAL
T VAGINALIS VAG QL WET PREP: ABNORMAL
YEAST VAG QL WET PREP: ABNORMAL

## 2022-10-25 PROCEDURE — 81002 URINALYSIS NONAUTO W/O SCOPE: CPT | Performed by: PHYSICIAN ASSISTANT

## 2022-10-25 PROCEDURE — 99214 OFFICE O/P EST MOD 30 MIN: CPT | Performed by: PHYSICIAN ASSISTANT

## 2022-10-25 PROCEDURE — 87210 SMEAR WET MOUNT SALINE/INK: CPT | Performed by: PHYSICIAN ASSISTANT

## 2022-10-25 RX ORDER — CEPHALEXIN 500 MG/1
CAPSULE ORAL
COMMUNITY
Start: 2022-10-24

## 2022-10-25 NOTE — PROGRESS NOTES
Assessment/Plan:    No problem-specific Assessment & Plan notes found for this encounter  Problem List Items Addressed This Visit    None     Visit Diagnoses     Vaginal discharge    -  Primary    Relevant Orders    POCT urine dip (Completed)    POCT wet mount (Completed)          Given rare yeast seen on wet mount and lack of irritation or vaginal itching today, will plan to initiate tx w coconut oil  If sx progress can call for Diflucan rx  Will continue abx, will plan to increase fluids and can alternate Tylenol and ibuprofen to help alleviate aching and muscular discomfort  Will follow up and tx as needed based on cx results done last night at urgent care office  Subjective:      Patient ID: Shelby Olivarez is a 40 y o  female  Pt with complaints of vaginal d/c and pelvic pain  Pt had yeast like sx a few weeks ago that she did not treat  Then got her menses and after her period had a yellowish greenish discharge  She was also experiencing pressure and pain in her pelvis  Pt then began w back pain last night and fever  She saw urgent care and was given abx  Took 1 dose of Keflex last night and this am  Possible slight improvement in sx  Does note still pressure w urination  No vaginal d/c today  No new partners, no changes with pain or sx associated with IC  Urgent care did send urine cxs as well as vaginitis/osis panel out last night  They did not do a pelvic exam        The following portions of the patient's history were reviewed and updated as appropriate:   She  has a past medical history of Amenorrhea, Contraceptive use, Disease of thyroid gland, History of early menarche, Irregular menstrual cycle, Spotting in pregnancy, antepartum condition or complication, Thyroid disease, Thyrotoxicosis (2009), and Varicella    She   Patient Active Problem List    Diagnosis Date Noted   • Hyperthyroidism 01/27/2022   • Chronic idiopathic constipation 08/30/2021   • Encounter for gynecological examination (general) (routine) without abnormal findings 10/29/2020   • Iron deficiency anemia 10/14/2020   • Costochondritis, acute 08/26/2020   • Vitamin D deficiency 08/26/2020   • History of hyperthyroidism 10/01/2019   • Low TSH level 11/09/2017   • Multiple thyroid nodules 11/09/2017   • Abnormal thyroid screen (blood) 09/26/2017     She  has a past surgical history that includes [place] tooth extraction; US guided thyroid biopsy (8/21/2020); and US guided thyroid biopsy (3/23/2022)  Her family history includes Breast cancer (age of onset: 36) in her mother; Cancer in her mother; Diabetes in her maternal uncle; Hearing loss in her maternal grandfather; Heart disease in her paternal grandmother; Hypertension in her father and paternal grandmother; Miscarriages / [place] in her mother; No Known Problems in her daughter and maternal aunt; Parkinsonism in her father; Thyroid disease in her maternal grandmother and mother  She  reports that she has never smoked  She has never used smokeless tobacco  She reports current alcohol use  She reports that she does not use drugs  Current Outpatient Medications   Medication Sig Dispense Refill   • calcium carbonate (OS-TONI) 600 MG tablet Take 600 mg by mouth in the morning and 600 mg in the evening  Take with meals  • cephalexin (KEFLEX) 500 mg capsule      • methimazole (TAPAZOLE) 10 mg tablet TAKE 2 TABLETS BY MOUTH IN THE MORNING 60 tablet 2   • multivitamin (THERAGRAN) TABS Take 1 tablet by mouth in the morning  • polyethylene glycol (MIRALAX) 17 g packet Take 17 g by mouth daily (Patient not taking: Reported on 10/25/2022) 100 each 2     No current facility-administered medications for this visit  Current Outpatient Medications on File Prior to Visit   Medication Sig   • calcium carbonate (OS-TONI) 600 MG tablet Take 600 mg by mouth in the morning and 600 mg in the evening  Take with meals     • cephalexin (KEFLEX) 500 mg capsule    • methimazole (TAPAZOLE) 10 mg tablet TAKE 2 TABLETS BY MOUTH IN THE MORNING   • multivitamin (THERAGRAN) TABS Take 1 tablet by mouth in the morning  • polyethylene glycol (MIRALAX) 17 g packet Take 17 g by mouth daily (Patient not taking: Reported on 10/25/2022)   • [DISCONTINUED] clindamycin (CLEOCIN T) 1 % lotion as needed (Patient not taking: No sig reported)   • [DISCONTINUED] hydrocortisone (ANUSOL-HC) 25 mg suppository Insert 1 suppository (25 mg total) into the rectum 2 (two) times a day (Patient not taking: Reported on 10/25/2022)   • [DISCONTINUED] metroNIDAZOLE (FLAGYL) 500 mg tablet Take 1 tablet (500 mg total) by mouth every 12 (twelve) hours for 7 days (Patient not taking: Reported on 10/25/2022)     No current facility-administered medications on file prior to visit  She has No Known Allergies       Review of Systems      Objective:      /72 (BP Location: Left arm, Patient Position: Sitting, Cuff Size: Standard)   Ht 5' 4" (1 626 m)   Wt 60 1 kg (132 lb 9 6 oz)   LMP 10/16/2022 (Exact Date)   Breastfeeding No   BMI 22 76 kg/m²          Physical Exam  Vitals reviewed  Constitutional:       Appearance: She is normal weight  Cardiovascular:      Rate and Rhythm: Normal rate and regular rhythm  Pulmonary:      Effort: Pulmonary effort is normal       Breath sounds: Normal breath sounds  Abdominal:      General: There is no distension  Palpations: Abdomen is soft  There is no mass  Tenderness: There is no abdominal tenderness  There is no right CVA tenderness, left CVA tenderness, guarding or rebound  Hernia: No hernia is present  Genitourinary:     General: Normal vulva  Pubic Area: No rash  Labia:         Right: No rash, tenderness, lesion or injury  Left: No rash, tenderness, lesion or injury  Urethra: No prolapse, urethral pain, urethral swelling or urethral lesion  Vagina: No signs of injury and foreign body   Vaginal discharge and erythema present  No tenderness, bleeding, lesions or prolapsed vaginal walls  Cervix: Erythema present  No cervical motion tenderness, friability, lesion or cervical bleeding  Uterus: Normal        Adnexa: Right adnexa normal and left adnexa normal       Comments: Minimal amount of internal vaginal and cervical erythema present on exam  Thin white d/c present in vaginal vault  No CMT  UT AV, NT, midline  No adnexal tenderness  Skin:     General: Skin is warm and dry  Neurological:      Mental Status: She is alert  Psychiatric:         Mood and Affect: Mood normal          Behavior: Behavior normal          Thought Content:  Thought content normal          Judgment: Judgment normal

## 2022-10-28 ENCOUNTER — APPOINTMENT (OUTPATIENT)
Dept: LAB | Facility: CLINIC | Age: 37
End: 2022-10-28
Payer: COMMERCIAL

## 2022-10-28 DIAGNOSIS — E05.90 HYPERTHYROIDISM: ICD-10-CM

## 2022-10-28 LAB
T3 SERPL-MCNC: 1.2 NG/ML (ref 0.6–1.8)
T4 FREE SERPL-MCNC: 1.03 NG/DL (ref 0.76–1.46)
TSH SERPL DL<=0.05 MIU/L-ACNC: 1.02 UIU/ML (ref 0.45–4.5)

## 2022-10-28 PROCEDURE — 84439 ASSAY OF FREE THYROXINE: CPT

## 2022-10-28 PROCEDURE — 84480 ASSAY TRIIODOTHYRONINE (T3): CPT

## 2022-10-28 PROCEDURE — 36415 COLL VENOUS BLD VENIPUNCTURE: CPT

## 2022-10-28 PROCEDURE — 84443 ASSAY THYROID STIM HORMONE: CPT

## 2022-11-18 ENCOUNTER — HOSPITAL ENCOUNTER (OUTPATIENT)
Dept: NUCLEAR MEDICINE | Facility: HOSPITAL | Age: 37
Discharge: HOME | End: 2022-11-18

## 2022-11-18 DIAGNOSIS — R10.11 RUQ PAIN: ICD-10-CM

## 2022-11-18 RX ADMIN — SINCALIDE 1.2 MCG: 5 INJECTION, POWDER, LYOPHILIZED, FOR SOLUTION INTRAVENOUS at 09:23

## 2022-12-05 ENCOUNTER — TELEPHONE (OUTPATIENT)
Dept: GASTROENTEROLOGY | Facility: CLINIC | Age: 37
End: 2022-12-05

## 2022-12-05 DIAGNOSIS — K59.04 CHRONIC IDIOPATHIC CONSTIPATION: Primary | ICD-10-CM

## 2022-12-05 NOTE — TELEPHONE ENCOUNTER
Patients GI provider:  Morteza CARABALLO    Number to return call: [phone]    Reason for call: Pt calling to request her bowel prep be called into [facility] pharmacy again      Scheduled procedure/appointment date if applicable: Procedure 02/12/00

## 2024-05-17 NOTE — TELEPHONE ENCOUNTER
----- Message from Hitesh Peralat PA-C sent at 9/5/2019 12:23 PM EDT -----  Thyroid labs are normal  Never